# Patient Record
Sex: FEMALE | Race: BLACK OR AFRICAN AMERICAN | NOT HISPANIC OR LATINO | Employment: UNEMPLOYED | URBAN - METROPOLITAN AREA
[De-identification: names, ages, dates, MRNs, and addresses within clinical notes are randomized per-mention and may not be internally consistent; named-entity substitution may affect disease eponyms.]

---

## 2023-03-22 DIAGNOSIS — M54.17 LUMBOSACRAL NEURITIS: Primary | ICD-10-CM

## 2023-03-22 PROBLEM — G43.909 HEADACHE, MIGRAINE: Status: ACTIVE | Noted: 2023-03-22

## 2023-03-22 PROBLEM — M50.30 BULGE OF CERVICAL DISC WITHOUT MYELOPATHY: Status: ACTIVE | Noted: 2023-03-22

## 2023-03-22 PROBLEM — M99.09 SEGMENTAL AND SOMATIC DYSFUNCTION: Status: ACTIVE | Noted: 2023-03-22

## 2023-03-22 PROBLEM — M46.1 SACROILIITIS (CMS-HCC): Status: ACTIVE | Noted: 2023-03-22

## 2023-03-22 PROBLEM — S49.90XA SHOULDER INJURY: Status: ACTIVE | Noted: 2023-03-22

## 2023-03-22 PROBLEM — I10 HTN (HYPERTENSION): Status: ACTIVE | Noted: 2023-03-22

## 2023-03-22 PROBLEM — F41.1 GENERALIZED ANXIETY DISORDER: Status: ACTIVE | Noted: 2023-03-22

## 2023-03-22 PROBLEM — J31.0 CHRONIC RHINITIS: Status: ACTIVE | Noted: 2023-03-22

## 2023-03-22 PROBLEM — E78.5 HYPERLIPEMIA: Status: ACTIVE | Noted: 2023-03-22

## 2023-03-22 PROBLEM — M54.2 NECK PAIN: Status: ACTIVE | Noted: 2023-03-22

## 2023-03-22 PROBLEM — M54.9 BACK PAIN: Status: ACTIVE | Noted: 2023-03-22

## 2023-03-22 PROBLEM — M99.31 OSSEOUS STENOSIS OF NEURAL CANAL OF CERVICAL REGION: Status: ACTIVE | Noted: 2023-03-22

## 2023-03-22 PROBLEM — M54.30 SCIATICA: Status: ACTIVE | Noted: 2023-03-22

## 2023-03-22 PROBLEM — M54.12 CERVICAL RADICULOPATHY: Status: ACTIVE | Noted: 2023-03-22

## 2023-03-22 PROBLEM — J30.9 ALLERGIC RHINITIS: Status: ACTIVE | Noted: 2023-03-22

## 2023-03-22 PROBLEM — M53.9 MULTILEVEL DEGENERATIVE DISC DISEASE: Status: ACTIVE | Noted: 2023-03-22

## 2023-03-22 PROBLEM — M51.369 BULGING LUMBAR DISC: Status: ACTIVE | Noted: 2023-03-22

## 2023-03-22 PROBLEM — K21.9 GERD WITHOUT ESOPHAGITIS: Status: ACTIVE | Noted: 2023-03-22

## 2023-03-22 PROBLEM — M62.830 MUSCLE SPASM OF BACK: Status: ACTIVE | Noted: 2023-03-22

## 2023-03-22 PROBLEM — M54.50 LOW BACK PAIN, UNSPECIFIED: Status: ACTIVE | Noted: 2023-03-22

## 2023-03-22 PROBLEM — L50.8 CHRONIC URTICARIA: Status: ACTIVE | Noted: 2023-03-22

## 2023-03-22 PROBLEM — M51.36 BULGING LUMBAR DISC: Status: ACTIVE | Noted: 2023-03-22

## 2023-03-22 RX ORDER — ALBUTEROL SULFATE 90 UG/1
1-2 AEROSOL, METERED RESPIRATORY (INHALATION)
COMMUNITY

## 2023-03-22 RX ORDER — DICLOFENAC SODIUM 50 MG/1
50 TABLET, DELAYED RELEASE ORAL 3 TIMES DAILY
COMMUNITY
Start: 2022-07-01 | End: 2023-04-18 | Stop reason: ALTCHOICE

## 2023-03-22 RX ORDER — HYDROCODONE BITARTRATE AND ACETAMINOPHEN 5; 325 MG/1; MG/1
1 TABLET ORAL EVERY 6 HOURS PRN
Qty: 60 TABLET | Refills: 0 | Status: SHIPPED | OUTPATIENT
Start: 2023-03-22 | End: 2023-04-03 | Stop reason: RX

## 2023-03-22 RX ORDER — IBUPROFEN 600 MG/1
800 TABLET ORAL 3 TIMES DAILY
COMMUNITY
Start: 2022-06-27 | End: 2023-04-18 | Stop reason: ALTCHOICE

## 2023-03-22 RX ORDER — ACETAMINOPHEN 325 MG/1
325 TABLET ORAL EVERY 6 HOURS PRN
COMMUNITY
Start: 2023-01-23 | End: 2023-05-15 | Stop reason: ALTCHOICE

## 2023-03-22 RX ORDER — DOXYCYCLINE 100 MG/1
100 CAPSULE ORAL 2 TIMES DAILY
COMMUNITY
Start: 2022-12-09 | End: 2023-07-10 | Stop reason: ALTCHOICE

## 2023-03-22 RX ORDER — LEVOCETIRIZINE DIHYDROCHLORIDE 5 MG/1
5 TABLET, FILM COATED ORAL 2 TIMES DAILY
COMMUNITY
End: 2024-01-12

## 2023-03-22 RX ORDER — COLLAGEN, HYDROLYSATE (BOVINE) 100 %
POWDER (GRAM) MISCELLANEOUS
COMMUNITY
End: 2023-04-18 | Stop reason: ALTCHOICE

## 2023-03-22 RX ORDER — LORAZEPAM 2 MG/1
TABLET ORAL
COMMUNITY
Start: 2022-07-01 | End: 2024-01-25 | Stop reason: SDUPTHER

## 2023-03-22 RX ORDER — FLUTICASONE PROPIONATE 50 MCG
2 SPRAY, SUSPENSION (ML) NASAL DAILY
COMMUNITY

## 2023-03-22 RX ORDER — CALCIUM ACETATE 668 MG
TABLET ORAL DAILY
COMMUNITY

## 2023-03-22 RX ORDER — CLOBETASOL PROPIONATE 0.5 MG/G
CREAM TOPICAL 2 TIMES DAILY
COMMUNITY
Start: 2022-12-09

## 2023-03-22 RX ORDER — HYDROCODONE BITARTRATE AND ACETAMINOPHEN 5; 325 MG/1; MG/1
1 TABLET ORAL EVERY 6 HOURS PRN
COMMUNITY
End: 2023-03-22 | Stop reason: SDUPTHER

## 2023-03-22 RX ORDER — METOPROLOL SUCCINATE 25 MG/1
25 TABLET, EXTENDED RELEASE ORAL DAILY
COMMUNITY
Start: 2023-02-02 | End: 2023-12-11

## 2023-03-22 RX ORDER — NALOXONE HYDROCHLORIDE 4 MG/.1ML
4 SPRAY NASAL AS NEEDED
COMMUNITY
Start: 2020-03-09

## 2023-03-22 RX ORDER — CETIRIZINE HYDROCHLORIDE 10 MG/1
10 TABLET ORAL DAILY
COMMUNITY
End: 2023-04-18 | Stop reason: ALTCHOICE

## 2023-03-22 RX ORDER — CYCLOBENZAPRINE HCL 10 MG
10 TABLET ORAL
COMMUNITY
Start: 2022-06-27 | End: 2023-04-18 | Stop reason: ALTCHOICE

## 2023-03-28 ENCOUNTER — TELEPHONE (OUTPATIENT)
Dept: PRIMARY CARE | Facility: CLINIC | Age: 68
End: 2023-03-28
Payer: COMMERCIAL

## 2023-03-28 RX ORDER — TIZANIDINE 2 MG/1
1 TABLET ORAL EVERY 6 HOURS
COMMUNITY
Start: 2022-07-21 | End: 2023-04-18 | Stop reason: ALTCHOICE

## 2023-03-28 RX ORDER — OMEPRAZOLE 20 MG/1
1 CAPSULE, DELAYED RELEASE ORAL DAILY
COMMUNITY
Start: 2022-03-23 | End: 2023-09-24

## 2023-03-28 RX ORDER — PRAVASTATIN SODIUM 40 MG/1
1 TABLET ORAL DAILY
COMMUNITY
Start: 2018-10-27 | End: 2023-06-22

## 2023-03-28 NOTE — TELEPHONE ENCOUNTER
Patient left message the drug store does not have the hydrocodone any longer she wants to know if you can send in oxycodone in

## 2023-04-03 ENCOUNTER — OFFICE VISIT (OUTPATIENT)
Dept: PRIMARY CARE | Facility: CLINIC | Age: 68
End: 2023-04-03
Payer: COMMERCIAL

## 2023-04-03 VITALS
HEIGHT: 65 IN | HEART RATE: 72 BPM | BODY MASS INDEX: 31.32 KG/M2 | SYSTOLIC BLOOD PRESSURE: 140 MMHG | WEIGHT: 188 LBS | DIASTOLIC BLOOD PRESSURE: 89 MMHG | RESPIRATION RATE: 12 BRPM

## 2023-04-03 DIAGNOSIS — M54.17 LUMBOSACRAL NEURITIS: Primary | ICD-10-CM

## 2023-04-03 PROCEDURE — 3077F SYST BP >= 140 MM HG: CPT | Performed by: INTERNAL MEDICINE

## 2023-04-03 PROCEDURE — 1036F TOBACCO NON-USER: CPT | Performed by: INTERNAL MEDICINE

## 2023-04-03 PROCEDURE — 3079F DIAST BP 80-89 MM HG: CPT | Performed by: INTERNAL MEDICINE

## 2023-04-03 PROCEDURE — 1159F MED LIST DOCD IN RCRD: CPT | Performed by: INTERNAL MEDICINE

## 2023-04-03 PROCEDURE — 99213 OFFICE O/P EST LOW 20 MIN: CPT | Performed by: INTERNAL MEDICINE

## 2023-04-03 RX ORDER — BUTYROSPERMUM PARKII(SHEA BUTTER), SIMMONDSIA CHINENSIS (JOJOBA) SEED OIL, ALOE BARBADENSIS LEAF EXTRACT .01; 1; 3.5 G/100G; G/100G; G/100G
250 LIQUID TOPICAL 2 TIMES DAILY
COMMUNITY
End: 2023-04-18 | Stop reason: ALTCHOICE

## 2023-04-03 RX ORDER — OXYCODONE HYDROCHLORIDE 5 MG/1
5 TABLET ORAL EVERY 4 HOURS PRN
Qty: 30 TABLET | Refills: 0 | Status: SHIPPED | OUTPATIENT
Start: 2023-04-03 | End: 2023-05-15 | Stop reason: ALTCHOICE

## 2023-04-03 ASSESSMENT — ENCOUNTER SYMPTOMS
LOSS OF SENSATION IN FEET: 1
DEPRESSION: 0
OCCASIONAL FEELINGS OF UNSTEADINESS: 0

## 2023-04-03 NOTE — PROGRESS NOTES
Subjective   Patient ID: Ac Torres is a 67 y.o. female who presents for Back Pain.    HPI   Refill pain med.  She has appointment to see her Pain management doctor tomorrow, hoping to get injections again. It helped last time.  Pain med is from us.  Her pharmacy told her that they have no hydrocodone, and no pharmacy carries that currently. They do have oxycodone.   She needs prior authorization for all pain meds per pt.  OARRS:  Heriberto Keller MD on 4/3/2023  1:00 PM  I have personally reviewed the OARRS report for Ac Torres. I have considered the risks of abuse, dependence, addiction and diversion    Is the patient prescribed a combination of a benzodiazepine and opioid?  Yes, I feel it is clincially indicated to continue the medication and have discussed with the patient risks/benefits/alternatives.    Last Urine Drug Screen / ordered today: Yes  Recent Results (from the past 11029 hour(s))   OPIATE/OPIOID/BENZO PRESCRIPTION COMPLIANCE    Collection Time: 08/22/22 12:45 PM   Result Value Ref Range    DRUG SCREEN COMMENT URINE SEE BELOW     Creatine, Urine 207.1 mg/dL    Amphetamine Screen, Urine PRESUMPTIVE NEGATIVE NEGATIVE    Barbiturate Screen, Urine PRESUMPTIVE NEGATIVE NEGATIVE    Cannabinoid Screen, Urine PRESUMPTIVE NEGATIVE NEGATIVE    Cocaine Screen, Urine PRESUMPTIVE NEGATIVE NEGATIVE    PCP Screen, Urine PRESUMPTIVE NEGATIVE NEGATIVE    7-Aminoclonazepam <25 Cutoff <25 ng/mL    Alpha-Hydroxyalprazolam <25 Cutoff <25 ng/mL    Alpha-Hydroxymidazolam <25 Cutoff <25 ng/mL    Alprazolam <25 Cutoff <25 ng/mL    Chlordiazepoxide <25 Cutoff <25 ng/mL    Clonazepam <25 Cutoff <25 ng/mL    Diazepam <25 Cutoff <25 ng/mL    Lorazepam 669 (A) Cutoff <25 ng/mL    Midazolam <25 Cutoff <25 ng/mL    Nordiazepam <25 Cutoff <25 ng/mL    Oxazepam <25 Cutoff <25 ng/mL    Temazepam <25 Cutoff <25 ng/mL    Zolpidem <25 Cutoff <25 ng/mL    Zolpidem Metabolite (ZCA) <25 Cutoff <25 ng/mL    6-Acetylmorphine <25  "Cutoff <25 ng/mL    Codeine <50 Cutoff <50 ng/mL    Hydrocodone <25 Cutoff <25 ng/mL    Hydromorphone <25 Cutoff <25 ng/mL    Morphine Urine <50 Cutoff <50 ng/mL    Norhydrocodone 41 (A) Cutoff <25 ng/mL    Noroxycodone <25 Cutoff <25 ng/mL    Oxycodone <25 Cutoff <25 ng/mL    Oxymorphone <25 Cutoff <25 ng/mL    Tramadol <50 Cutoff <50 ng/mL    O-Desmethyltramadol <50 Cutoff <50 ng/mL    Fentanyl <2.5 Cutoff<2.5 ng/mL    Norfentanyl <2.5 Cutoff<2.5 ng/mL    METHADONE CONFIRMATION,URINE <25 Cutoff <25 ng/mL    EDDP <25 Cutoff <25 ng/mL     Results are as expected.     Controlled Substance Agreement:  Date of the Last Agreement: today.    Reviewed Controlled Substance Agreement including but not limited to the benefits, risks, and alternatives to treatment with a Controlled Substance medication(s).    Opioids:  What is the patient's goal of therapy? Pain control  Is this being achieved with current treatment?  yes    I have calculated the patient's Morphine Dose Equivalent (MED):   I have considered referral to Pain Management and/or a specialist, and do not feel it is necessary at this time.    I feel that it is clinically indicated to continue this current medication regimen after consideration of alternative therapies, and other non-opioid treatment.    Opioid Risk Screening:  No data recorded    Pain Assessment:  No data recorded  Review of Systems    Objective   /89   Pulse 72   Resp 12   Ht 1.657 m (5' 5.25\")   Wt 85.3 kg (188 lb)   BMI 31.05 kg/m²     Physical Exam  Pt is in pain right now, left lower back down to left leg.   No numbness or weakness.    Assessment/Plan   Diagnoses and all orders for this visit:  Lumbosacral neuritis  -     oxyCODONE (Roxicodone) 5 mg immediate release tablet; Take 1 tablet (5 mg) by mouth every 4 hours if needed for moderate pain (4 - 6).    Her last urine screen was last Fall.    Follow up 3 months and as needed     "

## 2023-04-04 ENCOUNTER — TELEPHONE (OUTPATIENT)
Dept: PRIMARY CARE | Facility: CLINIC | Age: 68
End: 2023-04-04
Payer: COMMERCIAL

## 2023-04-04 NOTE — TELEPHONE ENCOUNTER
Let pt know that her bone density showed her bone density is mostly normal, and mild osteopenia at certain area. Please continue food rich in vit D calcium, and continue Weight bearing exercises

## 2023-04-17 ENCOUNTER — E-VISIT (OUTPATIENT)
Dept: PRIMARY CARE | Facility: CLINIC | Age: 68
End: 2023-04-17
Payer: COMMERCIAL

## 2023-04-18 ENCOUNTER — OFFICE VISIT (OUTPATIENT)
Dept: PRIMARY CARE | Facility: CLINIC | Age: 68
End: 2023-04-18
Payer: COMMERCIAL

## 2023-04-18 VITALS
SYSTOLIC BLOOD PRESSURE: 149 MMHG | WEIGHT: 190 LBS | HEART RATE: 62 BPM | BODY MASS INDEX: 30.53 KG/M2 | HEIGHT: 66 IN | DIASTOLIC BLOOD PRESSURE: 84 MMHG | OXYGEN SATURATION: 95 % | TEMPERATURE: 96.7 F

## 2023-04-18 DIAGNOSIS — H10.31 ACUTE BACTERIAL CONJUNCTIVITIS OF RIGHT EYE: Primary | ICD-10-CM

## 2023-04-18 PROCEDURE — 99213 OFFICE O/P EST LOW 20 MIN: CPT | Performed by: INTERNAL MEDICINE

## 2023-04-18 PROCEDURE — 3079F DIAST BP 80-89 MM HG: CPT | Performed by: INTERNAL MEDICINE

## 2023-04-18 PROCEDURE — 1036F TOBACCO NON-USER: CPT | Performed by: INTERNAL MEDICINE

## 2023-04-18 PROCEDURE — 3077F SYST BP >= 140 MM HG: CPT | Performed by: INTERNAL MEDICINE

## 2023-04-18 PROCEDURE — 1159F MED LIST DOCD IN RCRD: CPT | Performed by: INTERNAL MEDICINE

## 2023-04-18 RX ORDER — CHOLECALCIFEROL (VITAMIN D3) 25 MCG
1000 TABLET ORAL DAILY
COMMUNITY

## 2023-04-18 RX ORDER — SULFACETAMIDE SODIUM 100 MG/ML
2 SOLUTION/ DROPS OPHTHALMIC 4 TIMES DAILY
Qty: 15 ML | Refills: 0 | Status: SHIPPED | OUTPATIENT
Start: 2023-04-18 | End: 2023-04-25

## 2023-04-18 RX ORDER — HYDROXYZINE HYDROCHLORIDE 25 MG/1
TABLET, FILM COATED ORAL
COMMUNITY
Start: 2023-04-13 | End: 2023-05-15 | Stop reason: ALTCHOICE

## 2023-04-18 RX ORDER — TRIAMCINOLONE ACETONIDE 1 MG/G
OINTMENT TOPICAL
COMMUNITY
Start: 2023-04-13 | End: 2023-06-07 | Stop reason: ALTCHOICE

## 2023-04-18 RX ORDER — CLINDAMYCIN PHOSPHATE 10 UG/ML
LOTION TOPICAL
COMMUNITY
Start: 2023-04-13

## 2023-04-18 RX ORDER — LORAZEPAM 1 MG/1
1 TABLET ORAL DAILY PRN
COMMUNITY
Start: 2016-06-21 | End: 2023-06-07 | Stop reason: SDUPTHER

## 2023-04-18 RX ORDER — DESONIDE 0.5 MG/G
OINTMENT TOPICAL
COMMUNITY
Start: 2023-04-13

## 2023-04-18 NOTE — PROGRESS NOTES
"The patient is here for:   Chief Complaint   Patient presents with    Conjunctivitis        I have reviewed existing histories, notes, past medical history, surgical history, social history, family history, med list, allergy list, and immunization, and updated if applicable.    Patient's PCP is: Heriberto Keller MD    HPI:     right eye gritty, matted, burning since Saturday.  No other symptoms with this.  Today is feeling bit better.    Additional concerns and ROS:  none    Physical exam:  /84   Pulse 62   Temp 35.9 °C (96.7 °F)   Ht 1.676 m (5' 6\")   Wt 86.2 kg (190 lb)   SpO2 95%   BMI 30.67 kg/m²     right eye conjunctivitis noted. No piter orbital erythema or induration    Assessments/orders:   1. Acute bacterial conjunctivitis of right eye     - sulfacetamide (Bleph-10) 10 % ophthalmic solution; Administer 2 drops into affected eye(s) in the morning and 2 drops at noon and 2 drops in the evening and 2 drops before bedtime. Do all this for 7 days.  Dispense: 15 mL; Refill: 0      Plan/discussion and follow up:     Follow up prn           "

## 2023-04-24 ENCOUNTER — HOSPITAL ENCOUNTER (OUTPATIENT)
Dept: DATA CONVERSION | Facility: HOSPITAL | Age: 68
End: 2023-04-24
Attending: ANESTHESIOLOGY | Admitting: ANESTHESIOLOGY
Payer: COMMERCIAL

## 2023-04-24 DIAGNOSIS — M46.1 SACROILIITIS, NOT ELSEWHERE CLASSIFIED (CMS-HCC): ICD-10-CM

## 2023-04-24 DIAGNOSIS — Z91.040 LATEX ALLERGY STATUS: ICD-10-CM

## 2023-04-24 DIAGNOSIS — M54.50 LOW BACK PAIN, UNSPECIFIED: ICD-10-CM

## 2023-05-15 ENCOUNTER — PATIENT MESSAGE (OUTPATIENT)
Dept: PRIMARY CARE | Facility: CLINIC | Age: 68
End: 2023-05-15
Payer: COMMERCIAL

## 2023-05-15 DIAGNOSIS — M48.02 SPINAL STENOSIS OF CERVICAL REGION: Primary | Chronic | ICD-10-CM

## 2023-05-15 PROBLEM — J45.901 ASTHMA FLARE (HHS-HCC): Status: ACTIVE | Noted: 2023-05-15

## 2023-05-15 PROBLEM — G89.29 CHRONIC LOW BACK PAIN: Status: ACTIVE | Noted: 2023-05-15

## 2023-05-15 PROBLEM — I47.10 PAROXYSMAL SUPRAVENTRICULAR TACHYCARDIA (CMS-HCC): Status: ACTIVE | Noted: 2017-03-30

## 2023-05-15 PROBLEM — R07.89 ATYPICAL CHEST PAIN: Status: ACTIVE | Noted: 2023-05-15

## 2023-05-15 PROBLEM — R94.31 ABNORMAL EKG: Status: ACTIVE | Noted: 2023-05-15

## 2023-05-15 PROBLEM — R94.39 ABNORMAL STRESS TEST: Status: ACTIVE | Noted: 2023-05-15

## 2023-05-15 PROBLEM — M17.11 ARTHRITIS OF RIGHT KNEE: Status: ACTIVE | Noted: 2023-05-15

## 2023-05-15 PROBLEM — M25.561 KNEE PAIN, RIGHT: Status: ACTIVE | Noted: 2023-05-15

## 2023-05-15 PROBLEM — S46.912A LEFT SHOULDER STRAIN, INITIAL ENCOUNTER: Status: ACTIVE | Noted: 2021-05-06

## 2023-05-15 PROBLEM — M54.50 CHRONIC LOW BACK PAIN: Status: ACTIVE | Noted: 2023-05-15

## 2023-05-15 PROBLEM — J30.1 SEASONAL ALLERGIC RHINITIS DUE TO POLLEN: Status: ACTIVE | Noted: 2023-05-15

## 2023-05-15 PROBLEM — R49.0 HOARSENESS: Status: ACTIVE | Noted: 2023-05-15

## 2023-05-15 PROBLEM — R73.03 PREDIABETES: Status: ACTIVE | Noted: 2020-01-17

## 2023-05-15 PROBLEM — M75.42 IMPINGEMENT SYNDROME OF LEFT SHOULDER: Status: ACTIVE | Noted: 2021-05-06

## 2023-05-15 PROBLEM — M75.122 COMPLETE TEAR OF LEFT ROTATOR CUFF: Status: ACTIVE | Noted: 2021-05-06

## 2023-05-15 RX ORDER — OXYCODONE AND ACETAMINOPHEN 5; 325 MG/1; MG/1
1 TABLET ORAL EVERY 6 HOURS PRN
Qty: 30 TABLET | Refills: 0 | Status: SHIPPED | OUTPATIENT
Start: 2023-05-15 | End: 2023-06-14

## 2023-05-15 NOTE — TELEPHONE ENCOUNTER
From: Ac Torres  To: Heriberto Keller MD  Sent: 5/15/2023 9:16 AM EDT  Subject: Rx    May I have a prescription for oxycodone acetaminophen 5-325....Injection for sacroiliitis only worked 50% dealing with the pain again and my next appointment in pain mgt is in June

## 2023-06-02 LAB
ANION GAP IN SER/PLAS: 11 MMOL/L (ref 10–20)
CALCIUM (MG/DL) IN SER/PLAS: 9.8 MG/DL (ref 8.6–10.6)
CARBON DIOXIDE, TOTAL (MMOL/L) IN SER/PLAS: 30 MMOL/L (ref 21–32)
CHLORIDE (MMOL/L) IN SER/PLAS: 103 MMOL/L (ref 98–107)
CREATININE (MG/DL) IN SER/PLAS: 1.01 MG/DL (ref 0.5–1.05)
GFR FEMALE: 61 ML/MIN/1.73M2
GLUCOSE (MG/DL) IN SER/PLAS: 94 MG/DL (ref 74–99)
NATRIURETIC PEPTIDE B (PG/ML) IN SER/PLAS: 7 PG/ML (ref 0–99)
POTASSIUM (MMOL/L) IN SER/PLAS: 3.8 MMOL/L (ref 3.5–5.3)
SODIUM (MMOL/L) IN SER/PLAS: 140 MMOL/L (ref 136–145)
UREA NITROGEN (MG/DL) IN SER/PLAS: 16 MG/DL (ref 6–23)

## 2023-06-05 ENCOUNTER — TELEPHONE (OUTPATIENT)
Dept: PRIMARY CARE | Facility: CLINIC | Age: 68
End: 2023-06-05
Payer: COMMERCIAL

## 2023-06-07 DIAGNOSIS — F41.1 GENERALIZED ANXIETY DISORDER: Primary | ICD-10-CM

## 2023-06-07 PROBLEM — R06.09 DOE (DYSPNEA ON EXERTION): Status: ACTIVE | Noted: 2023-06-07

## 2023-06-07 PROBLEM — F41.9 ANXIETY DISORDER: Status: ACTIVE | Noted: 2023-06-07

## 2023-06-07 PROBLEM — I47.10 PAROXYSMAL SVT (SUPRAVENTRICULAR TACHYCARDIA) (CMS-HCC): Status: ACTIVE | Noted: 2023-06-07

## 2023-06-07 PROBLEM — R00.2 HEART PALPITATIONS: Status: ACTIVE | Noted: 2023-06-07

## 2023-06-07 RX ORDER — HYDROCHLOROTHIAZIDE 25 MG/1
25 TABLET ORAL DAILY
COMMUNITY
End: 2024-02-15 | Stop reason: SDUPTHER

## 2023-06-08 RX ORDER — LORAZEPAM 1 MG/1
1 TABLET ORAL DAILY PRN
Qty: 30 TABLET | Refills: 0 | Status: SHIPPED | OUTPATIENT
Start: 2023-06-08 | End: 2023-08-15 | Stop reason: SDUPTHER

## 2023-06-22 DIAGNOSIS — E78.5 HYPERLIPIDEMIA, UNSPECIFIED: ICD-10-CM

## 2023-06-22 RX ORDER — PRAVASTATIN SODIUM 40 MG/1
TABLET ORAL
Qty: 90 TABLET | Refills: 2 | Status: SHIPPED | OUTPATIENT
Start: 2023-06-22 | End: 2024-02-07 | Stop reason: SDUPTHER

## 2023-07-06 PROBLEM — M47.816 LUMBAR SPONDYLOSIS: Status: ACTIVE | Noted: 2023-07-06

## 2023-07-06 RX ORDER — OXYCODONE AND ACETAMINOPHEN 5; 325 MG/1; MG/1
TABLET ORAL
COMMUNITY
End: 2023-09-12 | Stop reason: SDUPTHER

## 2023-07-06 RX ORDER — ELECTROLYTES/DEXTROSE
SOLUTION, ORAL ORAL
COMMUNITY

## 2023-07-10 ENCOUNTER — OFFICE VISIT (OUTPATIENT)
Dept: PRIMARY CARE | Facility: CLINIC | Age: 68
End: 2023-07-10
Payer: COMMERCIAL

## 2023-07-10 ENCOUNTER — LAB (OUTPATIENT)
Dept: LAB | Facility: LAB | Age: 68
End: 2023-07-10
Payer: COMMERCIAL

## 2023-07-10 VITALS
OXYGEN SATURATION: 98 % | BODY MASS INDEX: 30.22 KG/M2 | WEIGHT: 188 LBS | HEIGHT: 66 IN | TEMPERATURE: 97.8 F | HEART RATE: 65 BPM | DIASTOLIC BLOOD PRESSURE: 86 MMHG | SYSTOLIC BLOOD PRESSURE: 122 MMHG

## 2023-07-10 DIAGNOSIS — R73.03 PRE-DIABETES: ICD-10-CM

## 2023-07-10 DIAGNOSIS — R42 DIZZY: ICD-10-CM

## 2023-07-10 DIAGNOSIS — R53.83 TIREDNESS: ICD-10-CM

## 2023-07-10 DIAGNOSIS — Z00.00 MEDICARE ANNUAL WELLNESS VISIT, INITIAL: Primary | ICD-10-CM

## 2023-07-10 DIAGNOSIS — E78.5 HYPERLIPIDEMIA, UNSPECIFIED HYPERLIPIDEMIA TYPE: ICD-10-CM

## 2023-07-10 DIAGNOSIS — Z79.899 HIGH RISK MEDICATION USE: ICD-10-CM

## 2023-07-10 PROBLEM — M17.11 PRIMARY OSTEOARTHRITIS OF RIGHT KNEE: Status: ACTIVE | Noted: 2019-12-19

## 2023-07-10 LAB
ALANINE AMINOTRANSFERASE (SGPT) (U/L) IN SER/PLAS: 16 U/L (ref 7–45)
ALBUMIN (G/DL) IN SER/PLAS: 4.1 G/DL (ref 3.4–5)
ALKALINE PHOSPHATASE (U/L) IN SER/PLAS: 73 U/L (ref 33–136)
ANION GAP IN SER/PLAS: 12 MMOL/L (ref 10–20)
ASPARTATE AMINOTRANSFERASE (SGOT) (U/L) IN SER/PLAS: 17 U/L (ref 9–39)
BILIRUBIN TOTAL (MG/DL) IN SER/PLAS: 0.4 MG/DL (ref 0–1.2)
CALCIUM (MG/DL) IN SER/PLAS: 9.7 MG/DL (ref 8.6–10.6)
CARBON DIOXIDE, TOTAL (MMOL/L) IN SER/PLAS: 29 MMOL/L (ref 21–32)
CHLORIDE (MMOL/L) IN SER/PLAS: 107 MMOL/L (ref 98–107)
CHOLESTEROL (MG/DL) IN SER/PLAS: 241 MG/DL (ref 0–199)
CHOLESTEROL IN HDL (MG/DL) IN SER/PLAS: 49.6 MG/DL
CHOLESTEROL/HDL RATIO: 4.9
CREATININE (MG/DL) IN SER/PLAS: 1.06 MG/DL (ref 0.5–1.05)
ERYTHROCYTE DISTRIBUTION WIDTH (RATIO) BY AUTOMATED COUNT: 15.4 % (ref 11.5–14.5)
ERYTHROCYTE MEAN CORPUSCULAR HEMOGLOBIN CONCENTRATION (G/DL) BY AUTOMATED: 30.9 G/DL (ref 32–36)
ERYTHROCYTE MEAN CORPUSCULAR VOLUME (FL) BY AUTOMATED COUNT: 89 FL (ref 80–100)
ERYTHROCYTES (10*6/UL) IN BLOOD BY AUTOMATED COUNT: 4.87 X10E12/L (ref 4–5.2)
ESTIMATED AVERAGE GLUCOSE FOR HBA1C: 128 MG/DL
GFR FEMALE: 58 ML/MIN/1.73M2
GLUCOSE (MG/DL) IN SER/PLAS: 96 MG/DL (ref 74–99)
HEMATOCRIT (%) IN BLOOD BY AUTOMATED COUNT: 43.4 % (ref 36–46)
HEMOGLOBIN (G/DL) IN BLOOD: 13.4 G/DL (ref 12–16)
HEMOGLOBIN A1C/HEMOGLOBIN TOTAL IN BLOOD: 6.1 %
LDL: 156 MG/DL (ref 0–99)
LEUKOCYTES (10*3/UL) IN BLOOD BY AUTOMATED COUNT: 6.5 X10E9/L (ref 4.4–11.3)
NRBC (PER 100 WBCS) BY AUTOMATED COUNT: 0 /100 WBC (ref 0–0)
PLATELETS (10*3/UL) IN BLOOD AUTOMATED COUNT: 349 X10E9/L (ref 150–450)
POTASSIUM (MMOL/L) IN SER/PLAS: 4.7 MMOL/L (ref 3.5–5.3)
PROTEIN TOTAL: 7.3 G/DL (ref 6.4–8.2)
SODIUM (MMOL/L) IN SER/PLAS: 143 MMOL/L (ref 136–145)
THYROTROPIN (MIU/L) IN SER/PLAS BY DETECTION LIMIT <= 0.05 MIU/L: 2.22 MIU/L (ref 0.44–3.98)
TRIGLYCERIDE (MG/DL) IN SER/PLAS: 178 MG/DL (ref 0–149)
UREA NITROGEN (MG/DL) IN SER/PLAS: 15 MG/DL (ref 6–23)
VLDL: 36 MG/DL (ref 0–40)

## 2023-07-10 PROCEDURE — 85027 COMPLETE CBC AUTOMATED: CPT

## 2023-07-10 PROCEDURE — 80361 OPIATES 1 OR MORE: CPT

## 2023-07-10 PROCEDURE — 80061 LIPID PANEL: CPT

## 2023-07-10 PROCEDURE — 80368 SEDATIVE HYPNOTICS: CPT

## 2023-07-10 PROCEDURE — 1159F MED LIST DOCD IN RCRD: CPT | Performed by: INTERNAL MEDICINE

## 2023-07-10 PROCEDURE — 3074F SYST BP LT 130 MM HG: CPT | Performed by: INTERNAL MEDICINE

## 2023-07-10 PROCEDURE — 80358 DRUG SCREENING METHADONE: CPT

## 2023-07-10 PROCEDURE — 80053 COMPREHEN METABOLIC PANEL: CPT

## 2023-07-10 PROCEDURE — 80354 DRUG SCREENING FENTANYL: CPT

## 2023-07-10 PROCEDURE — 80365 DRUG SCREENING OXYCODONE: CPT

## 2023-07-10 PROCEDURE — 83036 HEMOGLOBIN GLYCOSYLATED A1C: CPT

## 2023-07-10 PROCEDURE — G0439 PPPS, SUBSEQ VISIT: HCPCS | Performed by: INTERNAL MEDICINE

## 2023-07-10 PROCEDURE — 84443 ASSAY THYROID STIM HORMONE: CPT

## 2023-07-10 PROCEDURE — 80307 DRUG TEST PRSMV CHEM ANLYZR: CPT

## 2023-07-10 PROCEDURE — 1170F FXNL STATUS ASSESSED: CPT | Performed by: INTERNAL MEDICINE

## 2023-07-10 PROCEDURE — 36415 COLL VENOUS BLD VENIPUNCTURE: CPT

## 2023-07-10 PROCEDURE — 1036F TOBACCO NON-USER: CPT | Performed by: INTERNAL MEDICINE

## 2023-07-10 PROCEDURE — 80346 BENZODIAZEPINES1-12: CPT

## 2023-07-10 PROCEDURE — 3079F DIAST BP 80-89 MM HG: CPT | Performed by: INTERNAL MEDICINE

## 2023-07-10 PROCEDURE — 80373 DRUG SCREENING TRAMADOL: CPT

## 2023-07-10 ASSESSMENT — LIFESTYLE VARIABLES: TOTAL SCORE: 0

## 2023-07-10 ASSESSMENT — ACTIVITIES OF DAILY LIVING (ADL)
DOING_HOUSEWORK: INDEPENDENT
BATHING: INDEPENDENT
DRESSING: INDEPENDENT
GROCERY_SHOPPING: INDEPENDENT
MANAGING_FINANCES: INDEPENDENT
TAKING_MEDICATION: INDEPENDENT

## 2023-07-10 ASSESSMENT — PATIENT HEALTH QUESTIONNAIRE - PHQ9
SUM OF ALL RESPONSES TO PHQ9 QUESTIONS 1 AND 2: 0
1. LITTLE INTEREST OR PLEASURE IN DOING THINGS: NOT AT ALL
2. FEELING DOWN, DEPRESSED OR HOPELESS: NOT AT ALL

## 2023-07-10 NOTE — PROGRESS NOTES
SUBJECTIVE:   Ac Torres is a 68 y.o. female presenting for her annual physical/wellness.  PCP: Heriberto Keller MD  Current Outpatient Medications on File Prior to Visit   Medication Sig Dispense Refill    albuterol 90 mcg/actuation inhaler Inhale 2 puffs  in the morning and 2 puffs in the evening and 2 puffs before bedtime.      B.animalis,bifid,infantis,long (PROBIOTIC 4X ORAL) Take by mouth once daily.      biotin 5 mg capsule Take by mouth.      calcium acetate 668 mg (169 mg calcium) tablet Take by mouth once daily.      cholecalciferol (Vitamin D-3) 25 MCG (1000 UT) tablet Take 1 tablet (1,000 Units) by mouth once daily.      clindamycin (Cleocin T) 1 % lotion       clobetasol (Temovate) 0.05 % cream Apply topically 2 times a day.      desonide (DesOwen) 0.05 % ointment       fluticasone (Flonase) 50 mcg/actuation nasal spray Administer 2 sprays into each nostril once daily.      hydroCHLOROthiazide (HYDRODiuril) 25 mg tablet Take 1 tablet (25 mg) by mouth once daily.      levocetirizine (Xyzal) 5 mg tablet Take 1 tablet (5 mg) by mouth in the morning and 1 tablet (5 mg) before bedtime.      LORazepam (Ativan) 1 mg tablet Take 1 tablet (1 mg) by mouth once daily as needed for anxiety. 30 tablet 0    LORazepam (Ativan) 2 mg tablet Take 1 tablet (2 mg) by mouth every 6 hours if needed.      metoprolol succinate XL (Toprol-XL) 25 mg 24 hr tablet Take 1 tablet (25 mg) by mouth once daily.      naloxone (Narcan) 4 mg/0.1 mL nasal spray Administer 1 spray (4 mg) into affected nostril(s) if needed.      omeprazole (PriLOSEC) 20 mg DR capsule Take 1 capsule (20 mg) by mouth once daily.      oxyCODONE-acetaminophen (Percocet) 5-325 mg tablet Take by mouth.      pravastatin (Pravachol) 40 mg tablet TAKE 1 TABLET BY MOUTH EVERYDAY AT BEDTIME 90 tablet 2    [DISCONTINUED] doxycycline (Vibramycin) 100 mg capsule Take 1 capsule (100 mg) by mouth in the morning and 1 capsule (100 mg) before bedtime.       No current  "facility-administered medications on file prior to visit.     Medicare wellness visit. Also Follow up on urgent care visit yesterday. She woke up feeling lightheaded, it did not get better as day went, so her  drove her to our urgent care here. They checked EKG, did orthostatic and examined her, the only thing was that her pulses ox dropped when she stood up. EKG was alright. She did not have any neurologic symptoms, no sob cp worsening edema. No Fever and chills. No GI or  symptoms.  Today she no longer feels lightheaded. Just feels very groggy, wants to sleep. Past week she lost a couple of relatives, and then her birthday yesterday people kept sending her messages, so she has slept very little for past week, 2-3 hours per night. Her mind also races at times. She did learn how to meditate a while back, and has not done it for some time. She will try to go back to it.        Colon screening:  Colonoscopy 2018 at [facility], no polyps. q10y  Last pap: na  Last mammogram: march this year  Dexa ?  Vaccines Up to date. Pt declined all vaccines.  Diet:   healthy in general  Exercises:  regularly  Lab Results   Component Value Date    HGBA1C 5.7 (A) 12/09/2022     Lab Results   Component Value Date    CREATININE 1.01 06/02/2023     Lab Results   Component Value Date    WBC 6.4 01/23/2023    HGB 12.8 01/23/2023    HCT 40.7 01/23/2023    MCV 86 01/23/2023     01/23/2023     Lab Results   Component Value Date    CHOL 212 (H) 12/28/2021    CHOL 236 (H) 06/23/2020    CHOL 223 (H) 06/17/2019     Lab Results   Component Value Date    HDL 38.4 (A) 12/28/2021    HDL 48.1 06/23/2020    HDL 43.2 06/17/2019     No results found for: \"LDLCALC\"  Lab Results   Component Value Date    TRIG 154 (H) 12/28/2021    TRIG 125 06/23/2020    TRIG 140 06/17/2019     No components found for: \"CHOLHDL\"       ROS:   Feeling well. No dyspnea or chest pain on exertion. No abdominal pain, change in bowel habits, black or bloody stools. No " "urinary tract or  symptoms.  No breast concerns. No neurological complaints.    OBJECTIVE:   The patient appears well, alert, oriented x 3, in no distress.   /86   Pulse 65   Temp 36.6 °C (97.8 °F)   Ht 1.676 m (5' 6\")   Wt 85.3 kg (188 lb)   SpO2 98%   BMI 30.34 kg/m²   ENT normal.  Neck supple. No adenopathy or thyromegaly. RADHA. Lungs are clear, good air entry, no wheezes, rhonchi or rales. S1 and S2 normal, no murmurs, regular rate and rhythm. Abdomen is soft without tenderness, guarding, mass or organomegaly.  exam deferred to Gyn. Extremities show no edema, normal peripheral pulses. Neurological is normal without focal findings.    A/P:  1. Medicare annual wellness visit, initial       2. Dizzy     - CBC; Future  - Comprehensive Metabolic Panel; Future  - TSH with reflex to Free T4 if abnormal; Future    3. Tiredness     - CBC; Future  - TSH with reflex to Free T4 if abnormal; Future    4. Hyperlipidemia, unspecified hyperlipidemia type     - Comprehensive Metabolic Panel; Future  - Lipid Panel; Future    5. Pre-diabetes     - Hemoglobin A1C; Future   PLAN:       Follow up:  3 months    OARRS:  Heriberto Keller MD on 7/10/2023  9:31 AM  I have personally reviewed the OARRS report for Ac Torres. I have considered the risks of abuse, dependence, addiction and diversion    Is the patient prescribed a combination of a benzodiazepine and opioid?  Yes, I feel it is clincially indicated to continue the medication and have discussed with the patient risks/benefits/alternatives.    Last Urine Drug Screen / ordered today: Yes  Recent Results (from the past 06892 hour(s))   OPIATE/OPIOID/BENZO PRESCRIPTION COMPLIANCE    Collection Time: 08/22/22 12:45 PM   Result Value Ref Range    DRUG SCREEN COMMENT URINE SEE BELOW     Creatine, Urine 207.1 mg/dL    Amphetamine Screen, Urine PRESUMPTIVE NEGATIVE NEGATIVE    Barbiturate Screen, Urine PRESUMPTIVE NEGATIVE NEGATIVE    Cannabinoid Screen, Urine " PRESUMPTIVE NEGATIVE NEGATIVE    Cocaine Screen, Urine PRESUMPTIVE NEGATIVE NEGATIVE    PCP Screen, Urine PRESUMPTIVE NEGATIVE NEGATIVE    7-Aminoclonazepam <25 Cutoff <25 ng/mL    Alpha-Hydroxyalprazolam <25 Cutoff <25 ng/mL    Alpha-Hydroxymidazolam <25 Cutoff <25 ng/mL    Alprazolam <25 Cutoff <25 ng/mL    Chlordiazepoxide <25 Cutoff <25 ng/mL    Clonazepam <25 Cutoff <25 ng/mL    Diazepam <25 Cutoff <25 ng/mL    Lorazepam 669 (A) Cutoff <25 ng/mL    Midazolam <25 Cutoff <25 ng/mL    Nordiazepam <25 Cutoff <25 ng/mL    Oxazepam <25 Cutoff <25 ng/mL    Temazepam <25 Cutoff <25 ng/mL    Zolpidem <25 Cutoff <25 ng/mL    Zolpidem Metabolite (ZCA) <25 Cutoff <25 ng/mL    6-Acetylmorphine <25 Cutoff <25 ng/mL    Codeine <50 Cutoff <50 ng/mL    Hydrocodone <25 Cutoff <25 ng/mL    Hydromorphone <25 Cutoff <25 ng/mL    Morphine Urine <50 Cutoff <50 ng/mL    Norhydrocodone 41 (A) Cutoff <25 ng/mL    Noroxycodone <25 Cutoff <25 ng/mL    Oxycodone <25 Cutoff <25 ng/mL    Oxymorphone <25 Cutoff <25 ng/mL    Tramadol <50 Cutoff <50 ng/mL    O-Desmethyltramadol <50 Cutoff <50 ng/mL    Fentanyl <2.5 Cutoff<2.5 ng/mL    Norfentanyl <2.5 Cutoff<2.5 ng/mL    METHADONE CONFIRMATION,URINE <25 Cutoff <25 ng/mL    EDDP <25 Cutoff <25 ng/mL     Results are as expected.     Controlled Substance Agreement:  Date of the Last Agreement: today  Reviewed Controlled Substance Agreement including but not limited to the benefits, risks, and alternatives to treatment with a Controlled Substance medication(s).    Opioids:  What is the patient's goal of therapy? Chronic low back pain DDD  Is this being achieved with current treatment? yes    I have calculated the patient's Morphine Dose Equivalent (MED):   Pt is seeing pain management  I feel that it is clinically indicated to continue this current medication regimen after consideration of alternative therapies, and other non-opioid treatment.    Opioid Risk Screening:  Family History of Substance  Abuse  Alcohol: 0 (7/10/2023  9:00 AM)  Illegal Dru (7/10/2023  9:00 AM)  Prescription Dru (7/10/2023  9:00 AM)    Personal History of Substance Abuse  Alcohol: 0 (7/10/2023  9:00 AM)  Illegal Drugs: 0 (7/10/2023  9:00 AM)  Prescription Drugs: 0 (7/10/2023  9:00 AM)    Patient Age (16-45)  Age (16-45): 0 (7/10/2023  9:00 AM)    History of Preadolescent Sexual Abuse  History of Preadolescent Sexual Abuse: 0 (7/10/2023  9:00 AM)    Psychological Disease  Attention Deficit Disorder, Obsessive Compulsive Disorder, Bipolar, Schizophrenia: 0 (7/10/2023  9:00 AM)  Depression: 0 (7/10/2023  9:00 AM)    Total Score  Total Score: 0 (7/10/2023  9:00 AM)    Total Score Risk Category  TOTAL SCORE CATEGORY: Low Risk (0-3) (7/10/2023  9:00 AM)        Pain Assessment:  No data recorded and Benzodiazepines:  What is the patient's goal of therapy? anxiety  Is this being achieved with current treatment? yes    JUAN-7:  No data recorded    Activities of Daily Living:   Is your overall impression that this patient is benefiting (symptom reduction outweighs side effects) from benzodiazepine therapy? Yes     1. Physical Functioning: Better  2. Family Relationship: Better  3. Social Relationship: Better  4. Mood: Better  5. Sleep Patterns: Better  6. Overall Function: Better

## 2023-07-13 LAB
6-ACETYLMORPHINE: <25 NG/ML
7-AMINOCLONAZEPAM: <25 NG/ML
ALPHA-HYDROXYALPRAZOLAM: <25 NG/ML
ALPHA-HYDROXYMIDAZOLAM: <25 NG/ML
ALPRAZOLAM: <25 NG/ML
AMPHETAMINE (PRESENCE) IN URINE BY SCREEN METHOD: ABNORMAL
BARBITURATES PRESENCE IN URINE BY SCREEN METHOD: ABNORMAL
CANNABINOIDS IN URINE BY SCREEN METHOD: ABNORMAL
CHLORDIAZEPOXIDE: <25 NG/ML
CLONAZEPAM: <25 NG/ML
COCAINE (PRESENCE) IN URINE BY SCREEN METHOD: ABNORMAL
CODEINE: <50 NG/ML
CREATINE, URINE FOR DRUG: 229.7 MG/DL
DIAZEPAM: <25 NG/ML
DRUG SCREEN COMMENT URINE: ABNORMAL
EDDP: <25 NG/ML
FENTANYL CONFIRMATION, URINE: <2.5 NG/ML
HYDROCODONE: <25 NG/ML
HYDROMORPHONE: <25 NG/ML
LORAZEPAM: 222 NG/ML
METHADONE CONFIRMATION,URINE: <25 NG/ML
MIDAZOLAM: <25 NG/ML
MORPHINE URINE: <50 NG/ML
NORDIAZEPAM: <25 NG/ML
NORFENTANYL: <2.5 NG/ML
NORHYDROCODONE: <25 NG/ML
NOROXYCODONE: 59 NG/ML
O-DESMETHYLTRAMADOL: <50 NG/ML
OXAZEPAM: <25 NG/ML
OXYCODONE: <25 NG/ML
OXYMORPHONE: 40 NG/ML
PHENCYCLIDINE (PRESENCE) IN URINE BY SCREEN METHOD: ABNORMAL
TEMAZEPAM: <25 NG/ML
TRAMADOL: <50 NG/ML
ZOLPIDEM METABOLITE (ZCA): <25 NG/ML
ZOLPIDEM: <25 NG/ML

## 2023-07-24 ENCOUNTER — HOSPITAL ENCOUNTER (OUTPATIENT)
Dept: DATA CONVERSION | Facility: HOSPITAL | Age: 68
End: 2023-07-24
Attending: ANESTHESIOLOGY | Admitting: ANESTHESIOLOGY
Payer: COMMERCIAL

## 2023-07-24 DIAGNOSIS — M54.9 DORSALGIA, UNSPECIFIED: ICD-10-CM

## 2023-07-24 DIAGNOSIS — M46.1 SACROILIITIS, NOT ELSEWHERE CLASSIFIED (CMS-HCC): ICD-10-CM

## 2023-08-15 DIAGNOSIS — F41.1 GENERALIZED ANXIETY DISORDER: ICD-10-CM

## 2023-08-15 RX ORDER — LORAZEPAM 1 MG/1
1 TABLET ORAL DAILY PRN
Qty: 30 TABLET | Refills: 0 | Status: SHIPPED | OUTPATIENT
Start: 2023-08-15 | End: 2024-01-25 | Stop reason: SDUPTHER

## 2023-08-24 ENCOUNTER — PATIENT MESSAGE (OUTPATIENT)
Dept: PRIMARY CARE | Facility: CLINIC | Age: 68
End: 2023-08-24
Payer: COMMERCIAL

## 2023-08-24 DIAGNOSIS — Z12.31 ENCOUNTER FOR SCREENING MAMMOGRAM FOR MALIGNANT NEOPLASM OF BREAST: ICD-10-CM

## 2023-09-07 VITALS — WEIGHT: 188.49 LBS | HEIGHT: 66 IN | BODY MASS INDEX: 30.29 KG/M2

## 2023-09-11 ENCOUNTER — PATIENT MESSAGE (OUTPATIENT)
Dept: PRIMARY CARE | Facility: CLINIC | Age: 68
End: 2023-09-11
Payer: COMMERCIAL

## 2023-09-11 DIAGNOSIS — G89.29 CHRONIC BILATERAL LOW BACK PAIN, UNSPECIFIED WHETHER SCIATICA PRESENT: Primary | ICD-10-CM

## 2023-09-11 DIAGNOSIS — M54.50 CHRONIC BILATERAL LOW BACK PAIN, UNSPECIFIED WHETHER SCIATICA PRESENT: Primary | ICD-10-CM

## 2023-09-12 RX ORDER — OXYCODONE AND ACETAMINOPHEN 5; 325 MG/1; MG/1
1 TABLET ORAL EVERY 8 HOURS PRN
Qty: 30 TABLET | Refills: 0 | Status: SHIPPED | OUTPATIENT
Start: 2023-09-12 | End: 2023-10-18 | Stop reason: SDUPTHER

## 2023-09-24 DIAGNOSIS — K21.9 GASTRO-ESOPHAGEAL REFLUX DISEASE WITHOUT ESOPHAGITIS: ICD-10-CM

## 2023-09-24 RX ORDER — OMEPRAZOLE 20 MG/1
20 CAPSULE, DELAYED RELEASE ORAL DAILY
Qty: 90 CAPSULE | Refills: 3 | Status: SHIPPED | OUTPATIENT
Start: 2023-09-24

## 2023-09-29 VITALS — HEIGHT: 66 IN | BODY MASS INDEX: 30.29 KG/M2 | WEIGHT: 188.49 LBS

## 2023-10-03 ENCOUNTER — APPOINTMENT (OUTPATIENT)
Dept: PHYSICAL THERAPY | Facility: CLINIC | Age: 68
End: 2023-10-03
Payer: COMMERCIAL

## 2023-10-04 PROBLEM — M79.18 LEFT BUTTOCK PAIN: Status: ACTIVE | Noted: 2023-10-04

## 2023-10-04 PROBLEM — Z79.899 HIGH RISK MEDICATION USE: Status: ACTIVE | Noted: 2023-10-04

## 2023-10-04 PROBLEM — R30.0 DYSURIA: Status: ACTIVE | Noted: 2023-10-04

## 2023-10-04 PROBLEM — M54.51 VERTEBROGENIC LOW BACK PAIN: Status: ACTIVE | Noted: 2023-10-04

## 2023-10-04 PROBLEM — Z78.0 MENOPAUSE: Status: ACTIVE | Noted: 2023-10-04

## 2023-10-04 RX ORDER — TRIAMCINOLONE ACETONIDE 1 MG/G
OINTMENT TOPICAL
COMMUNITY
Start: 2023-09-27

## 2023-10-05 ENCOUNTER — TREATMENT (OUTPATIENT)
Dept: PHYSICAL THERAPY | Facility: CLINIC | Age: 68
End: 2023-10-05
Payer: COMMERCIAL

## 2023-10-05 DIAGNOSIS — M79.18 LEFT BUTTOCK PAIN: ICD-10-CM

## 2023-10-05 DIAGNOSIS — M54.9 BACK PAIN: Primary | ICD-10-CM

## 2023-10-05 PROCEDURE — 97112 NEUROMUSCULAR REEDUCATION: CPT | Mod: GP | Performed by: PHYSICAL THERAPIST

## 2023-10-05 PROCEDURE — 97110 THERAPEUTIC EXERCISES: CPT | Mod: GP | Performed by: PHYSICAL THERAPIST

## 2023-10-05 PROCEDURE — 97140 MANUAL THERAPY 1/> REGIONS: CPT | Mod: GP | Performed by: PHYSICAL THERAPIST

## 2023-10-05 ASSESSMENT — PAIN - FUNCTIONAL ASSESSMENT: PAIN_FUNCTIONAL_ASSESSMENT: 0-10

## 2023-10-05 ASSESSMENT — PAIN SCALES - GENERAL: PAINLEVEL_OUTOF10: 3

## 2023-10-05 NOTE — PROGRESS NOTES
Physical Therapy  Physical Therapy Treatment    Patient Name: Ac Torres  MRN: [id number]  Today's Date: 10/5/2023  Time Calculation  Start Time: 1415  Stop Time: 1510  Time Calculation (min): 55 min    Insurance:  Visit number: 6 of [place]  Authorization info: Medicare Certification Period 8/17/23-11/15/23  Insurance Type: Primary UHC; Secondary M[employer] Medicare    General:  Reason for visit: Chronic LBP  Referred by: Dr. Chin    Assessment: PT and pt discussing use of gym equipment at [facility] to continue strength training independently once discharged from PT. Pt verbalizing good understanding. Attempting squat to elevated table, however, pt experiencing R knee pain. Attempting wall squat through modified range, pt continuing to experience knee pain, thus discontinuing. Tolerating remaining exercises well with mild R knee pain after standing hip ext LLE d/t prolonged WBing through RLE. Overall, good tolerance to progressions made in clinic. At end of session, pt reports 4/10 L buttock pain with moderate R knee pain. Encouraged to utilize pool for symptom management as needed.        Plan: Gradually progress core stabilization and general core/hip strength to tolerance, monitoring R knee tolerance. Recheck in 2 visits.       Current Problem  1. Back pain        2. Left buttock pain            Precautions:   Precautions  Precautions Comment: HBP, paroxysmal atrial tachycardia    Subjective:  Subjective   Pt presents to PT with 3/10 L buttock pain. Notes improvement with POC. Felt better after last session. Performing HEP every other day. Walking 2.5 miles without increase in pain if wearing SI belt. Sees pain management tomorrow, does not believe she needs an injection. Believes she'll be ready for discharge in 2 visits. Of note, has not returned to pool at [facility], stating she likely won't go when it's cold outside.    Pain  Pain Assessment: 0-10  Pain Score: 3  Pain Location: Buttocks (Left)    Performing HEP?:  "Yes    Objective:  Objective   Excessive ant tib translation with DL squat    Treatments:  Therapeutic Exercise  Therapeutic Exercise Performed: Yes  Therapeutic Exercise Activity 1: Stepper L2 5'  Therapeutic Exercise Activity 2: Resisted hip abd 25# 2x15  Therapeutic Exercise Activity 3: Resisted hip add 25# 2x15  Therapeutic Exercise Activity 4: Supine flies 1# x15  Therapeutic Exercise Activity 5: BKFO x15  Therapeutic Exercise Activity 6: Palloff press 5# 10x10\" holds  Therapeutic Exercise Activity 7: Hip ext machine 33lb 1-2x15    Balance/Neuromuscular Re-Education  Balance/Neuromuscular Re-Education Activity Performed: Yes  Balance/Neuromuscular Re-Education Activity 1: DBE 2' ea  Balance/Neuromuscular Re-Education Activity 2: Tandem 4kg KB x10 cw/ccw R/L    Manual Therapy  Manual Therapy Performed: Yes  Manual Therapy Activity 1: Theragun L glut min/piriformis 5'      HEP Access Code: [email or web address]    Ruma Doyle, PT  "

## 2023-10-06 ENCOUNTER — OFFICE VISIT (OUTPATIENT)
Dept: PAIN MEDICINE | Facility: CLINIC | Age: 68
End: 2023-10-06
Payer: COMMERCIAL

## 2023-10-06 VITALS
DIASTOLIC BLOOD PRESSURE: 75 MMHG | SYSTOLIC BLOOD PRESSURE: 120 MMHG | WEIGHT: 186.2 LBS | HEIGHT: 66 IN | TEMPERATURE: 97 F | OXYGEN SATURATION: 99 % | BODY MASS INDEX: 29.92 KG/M2 | HEART RATE: 73 BPM | RESPIRATION RATE: 16 BRPM

## 2023-10-06 DIAGNOSIS — M46.1 SACROILIITIS (CMS-HCC): Primary | ICD-10-CM

## 2023-10-06 DIAGNOSIS — M47.816 LUMBAR SPONDYLOSIS: ICD-10-CM

## 2023-10-06 PROCEDURE — 99214 OFFICE O/P EST MOD 30 MIN: CPT | Performed by: ANESTHESIOLOGY

## 2023-10-06 PROCEDURE — 3078F DIAST BP <80 MM HG: CPT | Performed by: ANESTHESIOLOGY

## 2023-10-06 PROCEDURE — 3074F SYST BP LT 130 MM HG: CPT | Performed by: ANESTHESIOLOGY

## 2023-10-06 PROCEDURE — 1159F MED LIST DOCD IN RCRD: CPT | Performed by: ANESTHESIOLOGY

## 2023-10-06 PROCEDURE — 1125F AMNT PAIN NOTED PAIN PRSNT: CPT | Performed by: ANESTHESIOLOGY

## 2023-10-06 PROCEDURE — 1036F TOBACCO NON-USER: CPT | Performed by: ANESTHESIOLOGY

## 2023-10-06 ASSESSMENT — PAIN - FUNCTIONAL ASSESSMENT: PAIN_FUNCTIONAL_ASSESSMENT: 0-10

## 2023-10-06 ASSESSMENT — COLUMBIA-SUICIDE SEVERITY RATING SCALE - C-SSRS
2. HAVE YOU ACTUALLY HAD ANY THOUGHTS OF KILLING YOURSELF?: NO
6. HAVE YOU EVER DONE ANYTHING, STARTED TO DO ANYTHING, OR PREPARED TO DO ANYTHING TO END YOUR LIFE?: NO
1. IN THE PAST MONTH, HAVE YOU WISHED YOU WERE DEAD OR WISHED YOU COULD GO TO SLEEP AND NOT WAKE UP?: NO

## 2023-10-06 ASSESSMENT — PAIN DESCRIPTION - DESCRIPTORS: DESCRIPTORS: ACHING

## 2023-10-06 ASSESSMENT — ENCOUNTER SYMPTOMS
LOSS OF SENSATION IN FEET: 0
CARDIOVASCULAR NEGATIVE: 1
ENDOCRINE NEGATIVE: 1
BACK PAIN: 1
GASTROINTESTINAL NEGATIVE: 1
PSYCHIATRIC NEGATIVE: 1
DEPRESSION: 0
EYES NEGATIVE: 1
HEMATOLOGIC/LYMPHATIC NEGATIVE: 1
OCCASIONAL FEELINGS OF UNSTEADINESS: 0
NEUROLOGICAL NEGATIVE: 1
RESPIRATORY NEGATIVE: 1

## 2023-10-06 ASSESSMENT — PATIENT HEALTH QUESTIONNAIRE - PHQ9
2. FEELING DOWN, DEPRESSED OR HOPELESS: NOT AT ALL
1. LITTLE INTEREST OR PLEASURE IN DOING THINGS: NOT AT ALL
SUM OF ALL RESPONSES TO PHQ9 QUESTIONS 1 AND 2: 0

## 2023-10-06 ASSESSMENT — LIFESTYLE VARIABLES: TOTAL SCORE: 0

## 2023-10-06 ASSESSMENT — PAIN SCALES - GENERAL
PAINLEVEL_OUTOF10: 3
PAINLEVEL: 3

## 2023-10-06 NOTE — PROGRESS NOTES
This is 68 y.o.  female with who has been treated for Lower back pain. Pain is better, The pain is described as achiness and is relieved by Lying down and Medications percocet  . Here for follow-up .Patient here for follow-up ,she did have a left SI injection on July 24, 2023 she states that she had great improvement with that injection.  She had 80% pain relief for a month or 2 . She states she feels the pain now but it is very minimal rates her pain 3 out of 10 is currently doing water therapy doing well.  She does have some discomfort she describes it as aching in nature.  Chief Complaint   Patient presents with    Follow-up    Back Pain       Pain Therapies:  Percocet patient also does physical therapy and aquatic therapy rest at home.  Stays active and mobile to decrease pain

## 2023-10-06 NOTE — PROGRESS NOTES
SUBJECTIVE:  This is 68 y.o.  female with PMH of obesity BMI of 31, anxiety, hypertension, chronic lower back pain on hydrocodone from PCP who has been treated for Lower back pain and RT sacroiliitis . Pain is better, The pain is described as achiness and is relieved by Lying down with who is here for follow-up very happy with the result of her SI joint injection with totally improved pain in the right gluteal area.  The patient is in physical therapy and she already bought SI joint belt and that is helping her a lot when she walks.  Patient to continue her physical therapy and self related exercises and she can call the office to repeat the injection when it is needed.        Prior office visit:  6/21/2023 Adolfo Chin note:  This is 67 year year old female recall past medical history of anxiety, hypertension, chronic lower back pain on hydrocodone from PCP. Follow-up for left SI joint injection in April which provided 50% relief for roughly 4 to 5 weeks. Patient was able to complete ADLs and increase her quality of life. She is very active walks 2 miles a day. Her left lower back pain has returned. She manages her pain with Aleve the occasional hydrocodone-acetaminophen for breakthrough pain. On physical examination she has pain on the left sacral notch, positive left Solitario Eduardo left sacroiliac thigh thrust. Symptoms consistent with sacroiliitis we will plan for left SI joint steroid injection. Provided referral for aqua therapy and can advance to land therapy when indicated by therapist. Encouraged home stretching as tolerated.      Recall office visit April 4, 2023  This is 67 year year old female recall past medical history of anxiety, hypertension, chronic lower back pain on hydrocodone from PCP and oxcarbazepine 600 mg twice daily from us who had left SI joint injection in December last year which helped her significantly who is here for follow-up stating that the left SI injection had helped her  significantly until recently and now it is really bad again and she would like another injection. The patient had bad right knee arthritis but she is going for a 3 gel injection at [facility] by her orthopedic surgeon. I discussed with her Zilretta injection if the other one failed.      Last procedure:   7/24/2023 left SI joint injection the patient has had 80% improvement in pain and function  4/24/2023 left SI joint injection has had a 50% relief for 4 to 5 weeks  11/7/2022 left SI injection the patient has had a 100% improvement in pain and function for almost 2 months  9/19/2022 left SI injection the patient has had a 100% improvement in pain and function and the pain just came back  8/8/2022 left L3-5 MBB the patient has had a 70% improvement in pain and function  Caudal and bilateral L5-S1 interlaminar JULIO C by Lydia the patient has had helped with the legs numbness but never affected the lower back pain which is her major problem    Portions of record reviewed for pertinent issues: active problem list, medication list, allergies, family history, social history, notes from last encounter, encounters, lab results, imaging and other system records.        I have personally reviewed the [facility] report for this patient. This report is scanned into the electronic medical record. I have considered the risks of abuse, dependence, addiction and diversion. It showed hydrocodone 5 mg twice daily from Heriberto Keller MD   OPIOID RISK ASSESSMENT SCORE 0/26  Aberrant behavior: None      Social history: The patient is retired, she has 3 children and 7 grandchildren, finished few college classes denies smoking drinking or use of illicit drugs    Diagnostic studies:    7/19/2022 cervical x-ray showed mild spondylotic changes with mild degeneration in the lower cervical spine    12/8/2021 EMG of the left leg was within normal limits    11/6/2021 MRI of the lumbar spine did not show any bone marrow edema or endplate changes, grade 1  "spondylosis at the L4-5 but no canal stenosis no foraminal stenosis but significant facet hypertrophy and spondylosis worse at the L4-5 and L5-S1:  FINDINGS:  Mild levoscoliosis noted. Conus medullaris terminates at the level of  the L1 vertebral body. Visualized portions of the conus and the  spinal cord shows normal signal intensity and caliber.    Vertebral body heights are well maintained. Mild heterogeneous bone  marrow signal intensity is normal. Trace anterolisthesis of L4 on L5,  L3 on L4.    At T12-L1 trace retrolisthesis of T12 on L1, diffuse disc bulge, mild  facet joint arthropathy in combination noted causing mild central  spinal canal stenosis and minimal encroachment of the bilateral  foramina.    At L1-L2 mild disc bulge, facet joint arthropathy in combination  noted causing mild central spinal canal stenosis, and mild bilateral  neural foramina narrowing.    At L2-L3 mild disc bulge, facet joint arthropathy, ligamentum flavum  hypertrophy noted causing minimal encroachment of the spinal canal  and bilateral neural foramina.    At L3-L4 trace anterolisthesis of L3 on L4, minimal disc bulge,  significant facet joint arthropathy, mild ligamentum flavum  hypertrophy, no significant central spinal canal or neural foramina  stenosis.    At L4-L5 grade 1 anterolisthesis of L4 on L5, significant facet joint  arthropathy noted causing mild encroachment of the spinal canal and  right neural foramina.    At L5-S1 mild facet joint arthropathy noted, mild disc bulge, without  any significant central spinal canal or neural foramina stenosis.    IMPRESSION:  There is mild degenerative spinal canal or neural foramina stenosis  at T12-L1, L1-L2, L2-L3.        /75 (BP Location: Left arm, Patient Position: Sitting, BP Cuff Size: Adult)   Pulse 73   Temp 36.1 °C (97 °F) (Temporal)   Resp 16   Ht 1.676 m (5' 6\")   Wt 84.5 kg (186 lb 3.2 oz)   SpO2 99%   BMI 30.05 kg/m²    Review of Systems   HENT: Negative. "     Eyes: Negative.    Respiratory: Negative.     Cardiovascular: Negative.    Gastrointestinal: Negative.    Endocrine: Negative.    Genitourinary: Negative.    Musculoskeletal:  Positive for back pain.   Skin: Negative.    Neurological: Negative.    Hematological: Negative.    Psychiatric/Behavioral: Negative.        Physical Exam  Vitals and nursing note reviewed.   Constitutional:       Appearance: Normal appearance.   HENT:      Head: Normocephalic and atraumatic.      Nose: Nose normal.   Eyes:      Extraocular Movements: Extraocular movements intact.      Conjunctiva/sclera: Conjunctivae normal.      Pupils: Pupils are equal, round, and reactive to light.   Cardiovascular:      Rate and Rhythm: Normal rate and regular rhythm.      Pulses: Normal pulses.      Heart sounds: Normal heart sounds.   Pulmonary:      Effort: Pulmonary effort is normal.      Breath sounds: Normal breath sounds.   Abdominal:      General: Abdomen is flat. Bowel sounds are normal.      Palpations: Abdomen is soft.   Musculoskeletal:         General: Tenderness present.   Skin:     General: Skin is warm.   Neurological:      General: No focal deficit present.      Mental Status: She is alert.      Deep Tendon Reflexes: Reflexes are normal and symmetric.   Psychiatric:         Mood and Affect: Mood normal.         Behavior: Behavior normal.                        Plan  At least 50% of the visit was involved in the discussion of the options for treatment. We discussed exercises, medication, interventional therapies and surgery. Healthy life style is essential with patient hard work to achieve the wellness. In addition; discussion with the patient and/or family about any of the diagnostic results, impressions and/or recommended diagnostic studies, prognosis, risks and benefits of treatment options, instructions for treatment and/or follow-up, importance of compliance with chosen treatment options, risk-factor reduction, and patient/family  education.         Pool therapy, walking in the pool, at least 3x per week for 30 minutes  Continue self-directed physical therapy  Repeat left SI joint injection when patient calls  Continue weight control  Alternative chronic pain therapies was discussed, encouraged and information was handed  Return to Clinic 3 months     *Please note this report has been produced using speech recognition software and may contain errors related to that system including grammar, punctuation and spelling as well as words and phrases that may be inappropriate. If there are questions or concerns, please feel free to contact me to clarify.    Stacey Flaherty MD

## 2023-10-11 ENCOUNTER — OFFICE VISIT (OUTPATIENT)
Dept: PRIMARY CARE | Facility: CLINIC | Age: 68
End: 2023-10-11
Payer: COMMERCIAL

## 2023-10-11 VITALS
HEART RATE: 86 BPM | BODY MASS INDEX: 30.49 KG/M2 | OXYGEN SATURATION: 96 % | DIASTOLIC BLOOD PRESSURE: 72 MMHG | TEMPERATURE: 97.4 F | WEIGHT: 183 LBS | SYSTOLIC BLOOD PRESSURE: 110 MMHG | HEIGHT: 65 IN

## 2023-10-11 DIAGNOSIS — J40 BRONCHITIS: Primary | ICD-10-CM

## 2023-10-11 PROCEDURE — 1036F TOBACCO NON-USER: CPT | Performed by: INTERNAL MEDICINE

## 2023-10-11 PROCEDURE — 3074F SYST BP LT 130 MM HG: CPT | Performed by: INTERNAL MEDICINE

## 2023-10-11 PROCEDURE — 1125F AMNT PAIN NOTED PAIN PRSNT: CPT | Performed by: INTERNAL MEDICINE

## 2023-10-11 PROCEDURE — 99213 OFFICE O/P EST LOW 20 MIN: CPT | Performed by: INTERNAL MEDICINE

## 2023-10-11 PROCEDURE — 1159F MED LIST DOCD IN RCRD: CPT | Performed by: INTERNAL MEDICINE

## 2023-10-11 PROCEDURE — 3078F DIAST BP <80 MM HG: CPT | Performed by: INTERNAL MEDICINE

## 2023-10-11 RX ORDER — AZITHROMYCIN 250 MG/1
TABLET, FILM COATED ORAL
Qty: 6 TABLET | Refills: 0 | Status: SHIPPED | OUTPATIENT
Start: 2023-10-11 | End: 2023-10-16

## 2023-10-11 RX ORDER — PREDNISONE 10 MG/1
TABLET ORAL
Qty: 30 TABLET | Refills: 0 | Status: SHIPPED | OUTPATIENT
Start: 2023-10-11 | End: 2023-10-21

## 2023-10-11 RX ORDER — CODEINE PHOSPHATE AND GUAIFENESIN 10; 100 MG/5ML; MG/5ML
5 SOLUTION ORAL EVERY 6 HOURS PRN
Qty: 240 ML | Refills: 0 | Status: SHIPPED | OUTPATIENT
Start: 2023-10-11 | End: 2023-10-21

## 2023-10-11 NOTE — PROGRESS NOTES
"PCP: Heriberto Keller MD   I have reviewed existing histories, notes, past medical history, surgical history, social history, family history, med list, allergy list, and immunization, and updated.    HPI:   Coughing, running nose, blowing nose since one month ago, when her daughter came to visit her. Daughter had a cough too. No Fever and chills  She started to get wheezing. Continued to have cough, chest congestion. In am mucus is slightly yellow. No hx of asthma. Does get wheezy when she would have URI.        Lab Results   Component Value Date    WBC 6.5 07/10/2023    HGB 13.4 07/10/2023    HCT 43.4 07/10/2023     07/10/2023    CHOL 241 (H) 07/10/2023    TRIG 178 (H) 07/10/2023    HDL 49.6 07/10/2023    ALT 16 07/10/2023    AST 17 07/10/2023     07/10/2023    K 4.7 07/10/2023     07/10/2023    CREATININE 1.06 (H) 07/10/2023    BUN 15 07/10/2023    CO2 29 07/10/2023    TSH 2.22 07/10/2023    HGBA1C 6.1 (A) 07/10/2023     Physical exam:  /72   Pulse 86   Temp 36.3 °C (97.4 °F)   Ht 1.651 m (5' 5\")   Wt 83 kg (183 lb)   SpO2 96%   BMI 30.45 kg/m²     Pt constantly need to clear throat, coughing.  No fever.  Throat slightly erythema  TM normal  Heart RR  Lungs mild basal wheezing   No leg edema.    Assessments/orders:   1. Bronchitis  azithromycin (Zithromax) 250 mg tablet, predniSONE (Deltasone) 10 mg tablet, codeine-guaifenesin (Robitussin-AC)  mg/5 mL syrup      Continue albuterol inhaler and Mucinex. See rx  Follow up prn             "

## 2023-10-12 ENCOUNTER — APPOINTMENT (OUTPATIENT)
Dept: PHYSICAL THERAPY | Facility: CLINIC | Age: 68
End: 2023-10-12
Payer: COMMERCIAL

## 2023-10-17 ENCOUNTER — APPOINTMENT (OUTPATIENT)
Dept: PHYSICAL THERAPY | Facility: CLINIC | Age: 68
End: 2023-10-17
Payer: COMMERCIAL

## 2023-10-18 ENCOUNTER — PATIENT MESSAGE (OUTPATIENT)
Dept: PRIMARY CARE | Facility: CLINIC | Age: 68
End: 2023-10-18

## 2023-10-18 ENCOUNTER — TREATMENT (OUTPATIENT)
Dept: PHYSICAL THERAPY | Facility: CLINIC | Age: 68
End: 2023-10-18
Payer: COMMERCIAL

## 2023-10-18 DIAGNOSIS — G89.29 CHRONIC BILATERAL LOW BACK PAIN, UNSPECIFIED WHETHER SCIATICA PRESENT: ICD-10-CM

## 2023-10-18 DIAGNOSIS — M79.18 LEFT BUTTOCK PAIN: Primary | ICD-10-CM

## 2023-10-18 DIAGNOSIS — M54.9 BACK PAIN: ICD-10-CM

## 2023-10-18 DIAGNOSIS — M54.50 CHRONIC BILATERAL LOW BACK PAIN, UNSPECIFIED WHETHER SCIATICA PRESENT: ICD-10-CM

## 2023-10-18 DIAGNOSIS — J32.1 CHRONIC FRONTAL SINUSITIS: Primary | ICD-10-CM

## 2023-10-18 PROCEDURE — 97110 THERAPEUTIC EXERCISES: CPT | Mod: GP | Performed by: PHYSICAL THERAPIST

## 2023-10-18 PROCEDURE — 97140 MANUAL THERAPY 1/> REGIONS: CPT | Mod: GP | Performed by: PHYSICAL THERAPIST

## 2023-10-18 RX ORDER — OXYCODONE AND ACETAMINOPHEN 5; 325 MG/1; MG/1
1 TABLET ORAL EVERY 8 HOURS PRN
Qty: 30 TABLET | Refills: 0 | Status: SHIPPED | OUTPATIENT
Start: 2023-10-18 | End: 2023-11-20 | Stop reason: SDUPTHER

## 2023-10-18 RX ORDER — AZITHROMYCIN 250 MG/1
TABLET, FILM COATED ORAL
Qty: 6 TABLET | Refills: 0 | Status: SHIPPED | OUTPATIENT
Start: 2023-10-18 | End: 2023-10-23

## 2023-10-18 ASSESSMENT — PAIN - FUNCTIONAL ASSESSMENT: PAIN_FUNCTIONAL_ASSESSMENT: 0-10

## 2023-10-18 ASSESSMENT — PAIN SCALES - GENERAL: PAINLEVEL_OUTOF10: 4

## 2023-10-18 NOTE — PROGRESS NOTES
Physical Therapy  Physical Therapy Orthopedic Discharge Note    Patient Name: Ac Torres  MRN: [id number]  Today's Date: 10/18/2023  Time Calculation  Start Time: 1105  Stop Time: 1135  Time Calculation (min): 30 min    Insurance:  Visit number: 7 of [place]  Authorization info: Medicare Certification Period: 8/17/23-11/15/23  Insurance Type: Primary U[employer]; Secondary M[employer] Medicare    General:  Reason for visit: Chronic LBP  Referred by: Dr. Chin    Assessment: Pt demonstrating good progress with PT POC, reporting 85% improvement with decrease in pain intensity/frequency and improved strength and activity tolerance. Demonstrating improved LE strength, gait pattern, and HAYDE score improving by 4 points. Demonstrates good understanding of HEP, thus appropriate to discharge to I [facility]. Pt in agreement.        Plan: Updated 10/18/2023     Discharge to I HEP     Current Problem  1. Left buttock pain        2. Back pain            Precautions:  Precautions  Precautions Comment: HBP, paroxysmal atrial tachycardia      Subjective:  Subjective   Patient reports 4/10 L LBP upon arrival secondary to increased coughing as a result of bronchitis. Notes her L buttock pain has improved over the past few weeks, not exceeding 3/10. Also, reports improved strength and activity tolerance, requiring 40-50% less seated rest breaks during IADLs. States she is walking up to 2.5 miles/day while donning SIJ belt. Saw pain management recently, declining injections d/t decrease in symptoms. Reports HEP adherence. Agreeable to discharge to I HEP.    Current Condition:   Better    Pain:  Pain Assessment: 0-10  Pain Score: 4  Pain Location: Back    Location: L buttock  Description: Aching  Aggravating Factors: Walking and Sitting  Relieving Factors: Exercises    Self Reported Function (0-100%) = 85%  - 100% being back to PLOF    Objective:  Objective     Lumbar AROM %  Flexion 80%  Extension 50% L sided LBP   Right SB 50% L sided LBP  Left SB 50% L sided  LBP (more severe)  Right ROT 75%  Left ROT 75% slight L sided LBP    Lower Extremity MMT  Hip Flexion R 4/5; L 4/5  Hip Extension R 4/5; L 4-/5 (L sided LBP)  Hip Abduction R 4/5; L 4/5  Hip IR R 4+/5; L 4+/5  Hip ER R 4+/5; L 4+/5    Special Test  SLR R (-); L (-)    Gait Analysis : decreased bella     Outcome Measures: Updated 10/18/2023   HAYDE 8/50    EDUCATION: home exercise program, plan of care, activity modifications, pain management, and injury pathology       Goals: Updated 10/18/2023  Active       PT Problem       PT Goal 1       Start:  10/05/23    Expected End:  11/04/23       Patient demonstrate home exercise program adherence to supplement symptom reduction and functional gains made in clinic in 3 weeks - MET         PT Goal 2       Start:  10/05/23    Expected End:  12/04/23       Patient will verbalize pain (0-10) <1/10 at best, and <4/10 at worst to improve ADL tolerance in 10 weeks - MET  Pt will demonstrate pain-free lumbar AROM grossly WFL to improve joint mechanics during ADLs in 10 weeks - NOT MET  Patient will demonstrate LLE strength 4-/5 MMT to improve ease with functional mobility in 10 weeks - MET  Pt will demonstrate MCID (6 points) improvement on Modified Oswestry score (< 6 points or 12%) to demonstrate overall improved functional abilities in 6 weeks - PARTIALLY MET  Pt will report 50% reduction in seated rest breaks required during HH chores in 10 weeks - MET             Treatments:   Recheck performed, see objective measures  Reviewed HEP and [facility]     HEP Access Code: [email or web address]     Ruma Doyle, PT

## 2023-10-24 ENCOUNTER — PATIENT MESSAGE (OUTPATIENT)
Dept: PRIMARY CARE | Facility: CLINIC | Age: 68
End: 2023-10-24
Payer: COMMERCIAL

## 2023-10-24 DIAGNOSIS — E78.5 HYPERLIPIDEMIA, UNSPECIFIED HYPERLIPIDEMIA TYPE: Primary | ICD-10-CM

## 2023-10-24 DIAGNOSIS — R73.03 PRE-DIABETES: ICD-10-CM

## 2023-10-24 NOTE — TELEPHONE ENCOUNTER
From: Ac Torres  To: Heriberto Keller MD  Sent: 10/24/2023 7:08 AM EDT  Subject: Lab work    Good morning! Is the lab work in the system that I need to have repeated this month?

## 2023-11-02 ENCOUNTER — TELEPHONE (OUTPATIENT)
Dept: PRIMARY CARE | Facility: CLINIC | Age: 68
End: 2023-11-02

## 2023-11-02 ENCOUNTER — LAB (OUTPATIENT)
Dept: LAB | Facility: LAB | Age: 68
End: 2023-11-02
Payer: COMMERCIAL

## 2023-11-02 DIAGNOSIS — R73.03 PRE-DIABETES: ICD-10-CM

## 2023-11-02 DIAGNOSIS — E78.5 HYPERLIPIDEMIA, UNSPECIFIED HYPERLIPIDEMIA TYPE: ICD-10-CM

## 2023-11-02 LAB
CHOLEST SERPL-MCNC: 214 MG/DL (ref 0–199)
CHOLESTEROL/HDL RATIO: 4.9
EST. AVERAGE GLUCOSE BLD GHB EST-MCNC: 117 MG/DL
HBA1C MFR BLD: 5.7 %
HDLC SERPL-MCNC: 43.6 MG/DL
LDLC SERPL CALC-MCNC: 141 MG/DL
NON HDL CHOLESTEROL: 170 MG/DL (ref 0–149)
TRIGL SERPL-MCNC: 148 MG/DL (ref 0–149)
VLDL: 30 MG/DL (ref 0–40)

## 2023-11-02 PROCEDURE — 80061 LIPID PANEL: CPT

## 2023-11-02 PROCEDURE — 83036 HEMOGLOBIN GLYCOSYLATED A1C: CPT

## 2023-11-02 PROCEDURE — 36415 COLL VENOUS BLD VENIPUNCTURE: CPT

## 2023-11-02 NOTE — TELEPHONE ENCOUNTER
Let pt know her labs showed improvement in hba1c, and the cholesterol is similar to before.  Continue healthy diet and exercises      She will need 3 months Follow up in January. Please schedule appointment (for controlled med)=

## 2023-11-14 ENCOUNTER — PATIENT MESSAGE (OUTPATIENT)
Dept: PAIN MEDICINE | Facility: CLINIC | Age: 68
End: 2023-11-14
Payer: COMMERCIAL

## 2023-11-14 DIAGNOSIS — M46.1 SACROILIITIS (CMS-HCC): Primary | ICD-10-CM

## 2023-11-16 ENCOUNTER — PATIENT MESSAGE (OUTPATIENT)
Dept: PRIMARY CARE | Facility: CLINIC | Age: 68
End: 2023-11-16
Payer: COMMERCIAL

## 2023-11-16 DIAGNOSIS — F41.1 GENERALIZED ANXIETY DISORDER: ICD-10-CM

## 2023-11-16 DIAGNOSIS — G89.29 CHRONIC BILATERAL LOW BACK PAIN, UNSPECIFIED WHETHER SCIATICA PRESENT: ICD-10-CM

## 2023-11-16 DIAGNOSIS — M54.50 CHRONIC BILATERAL LOW BACK PAIN, UNSPECIFIED WHETHER SCIATICA PRESENT: ICD-10-CM

## 2023-11-16 RX ORDER — LORAZEPAM 1 MG/1
1 TABLET ORAL DAILY PRN
Qty: 30 TABLET | Refills: 0 | Status: CANCELLED | OUTPATIENT
Start: 2023-11-16 | End: 2024-05-14

## 2023-11-16 RX ORDER — LORAZEPAM 2 MG/1
TABLET ORAL
Qty: 1 TABLET | Refills: 0 | Status: SHIPPED | OUTPATIENT
Start: 2023-11-16 | End: 2024-02-28 | Stop reason: ALTCHOICE

## 2023-11-20 RX ORDER — OXYCODONE AND ACETAMINOPHEN 5; 325 MG/1; MG/1
1 TABLET ORAL EVERY 8 HOURS PRN
Qty: 30 TABLET | Refills: 0 | Status: SHIPPED | OUTPATIENT
Start: 2023-11-20 | End: 2024-01-11 | Stop reason: SDUPTHER

## 2023-11-20 NOTE — TELEPHONE ENCOUNTER
From: Ac Torres  To: Heriberto Keller MD  Sent: 11/16/2023 10:29 PM EST  Subject: Rx refill     Have pain radiating down my leg from my back scheduled for a procedure Dec 4th  Oxycodone acetaminophen 5-325 mg

## 2023-12-04 ENCOUNTER — APPOINTMENT (OUTPATIENT)
Dept: RADIOLOGY | Facility: CLINIC | Age: 68
End: 2023-12-04
Payer: COMMERCIAL

## 2023-12-04 ENCOUNTER — APPOINTMENT (OUTPATIENT)
Dept: PAIN MEDICINE | Facility: CLINIC | Age: 68
End: 2023-12-04
Payer: COMMERCIAL

## 2023-12-11 DIAGNOSIS — I10 PRIMARY HYPERTENSION: Primary | ICD-10-CM

## 2023-12-11 RX ORDER — METOPROLOL SUCCINATE 25 MG/1
25 TABLET, EXTENDED RELEASE ORAL DAILY
Qty: 90 TABLET | Refills: 3 | Status: SHIPPED | OUTPATIENT
Start: 2023-12-11 | End: 2024-02-15 | Stop reason: SDUPTHER

## 2024-01-11 DIAGNOSIS — G89.29 CHRONIC BILATERAL LOW BACK PAIN, UNSPECIFIED WHETHER SCIATICA PRESENT: ICD-10-CM

## 2024-01-11 DIAGNOSIS — M54.50 CHRONIC BILATERAL LOW BACK PAIN, UNSPECIFIED WHETHER SCIATICA PRESENT: ICD-10-CM

## 2024-01-11 RX ORDER — OXYCODONE AND ACETAMINOPHEN 5; 325 MG/1; MG/1
1 TABLET ORAL EVERY 8 HOURS PRN
Qty: 30 TABLET | Refills: 0 | Status: SHIPPED | OUTPATIENT
Start: 2024-01-11 | End: 2024-02-28 | Stop reason: SDUPTHER

## 2024-01-12 DIAGNOSIS — L50.8 OTHER URTICARIA: ICD-10-CM

## 2024-01-12 RX ORDER — LEVOCETIRIZINE DIHYDROCHLORIDE 5 MG/1
5 TABLET, FILM COATED ORAL 2 TIMES DAILY
Qty: 180 TABLET | Refills: 2 | Status: SHIPPED | OUTPATIENT
Start: 2024-01-12 | End: 2024-03-12 | Stop reason: WASHOUT

## 2024-01-15 ENCOUNTER — ANCILLARY PROCEDURE (OUTPATIENT)
Dept: RADIOLOGY | Facility: CLINIC | Age: 69
End: 2024-01-15
Payer: COMMERCIAL

## 2024-01-15 ENCOUNTER — HOSPITAL ENCOUNTER (OUTPATIENT)
Dept: PAIN MEDICINE | Facility: CLINIC | Age: 69
Discharge: HOME | End: 2024-01-15
Payer: COMMERCIAL

## 2024-01-15 VITALS
TEMPERATURE: 95.2 F | SYSTOLIC BLOOD PRESSURE: 134 MMHG | OXYGEN SATURATION: 100 % | HEART RATE: 76 BPM | DIASTOLIC BLOOD PRESSURE: 89 MMHG | RESPIRATION RATE: 16 BRPM

## 2024-01-15 DIAGNOSIS — M46.1 SACROILIITIS (CMS-HCC): ICD-10-CM

## 2024-01-15 PROCEDURE — 27096 INJECT SACROILIAC JOINT: CPT | Performed by: ANESTHESIOLOGY

## 2024-01-15 PROCEDURE — 77003 FLUOROGUIDE FOR SPINE INJECT: CPT

## 2024-01-15 PROCEDURE — 2500000005 HC RX 250 GENERAL PHARMACY W/O HCPCS

## 2024-01-15 PROCEDURE — 2500000004 HC RX 250 GENERAL PHARMACY W/ HCPCS (ALT 636 FOR OP/ED)

## 2024-01-15 RX ORDER — TRIAMCINOLONE ACETONIDE 40 MG/ML
INJECTION, SUSPENSION INTRA-ARTICULAR; INTRAMUSCULAR
Status: COMPLETED
Start: 2024-01-15 | End: 2024-01-15

## 2024-01-15 RX ORDER — BUPIVACAINE HYDROCHLORIDE 7.5 MG/ML
INJECTION, SOLUTION EPIDURAL; RETROBULBAR
Status: COMPLETED
Start: 2024-01-15 | End: 2024-01-15

## 2024-01-15 RX ORDER — LIDOCAINE HYDROCHLORIDE 5 MG/ML
INJECTION, SOLUTION INFILTRATION; INTRAVENOUS
Status: COMPLETED
Start: 2024-01-15 | End: 2024-01-15

## 2024-01-15 RX ADMIN — BUPIVACAINE HYDROCHLORIDE 75 MG: 7.5 INJECTION, SOLUTION EPIDURAL; RETROBULBAR at 09:16

## 2024-01-15 RX ADMIN — LIDOCAINE HYDROCHLORIDE 250 MG: 5 INJECTION, SOLUTION INFILTRATION at 09:15

## 2024-01-15 RX ADMIN — TRIAMCINOLONE ACETONIDE 40 MG: 40 INJECTION, SUSPENSION INTRA-ARTICULAR; INTRAMUSCULAR at 09:15

## 2024-01-15 ASSESSMENT — ENCOUNTER SYMPTOMS
CARDIOVASCULAR NEGATIVE: 1
BACK PAIN: 1
EYES NEGATIVE: 1
ENDOCRINE NEGATIVE: 1
NEUROLOGICAL NEGATIVE: 1
PSYCHIATRIC NEGATIVE: 1
HEMATOLOGIC/LYMPHATIC NEGATIVE: 1
GASTROINTESTINAL NEGATIVE: 1
RESPIRATORY NEGATIVE: 1

## 2024-01-15 ASSESSMENT — PAIN SCALES - GENERAL: PAINLEVEL_OUTOF10: 8

## 2024-01-15 ASSESSMENT — PAIN - FUNCTIONAL ASSESSMENT: PAIN_FUNCTIONAL_ASSESSMENT: 0-10

## 2024-01-15 ASSESSMENT — COLUMBIA-SUICIDE SEVERITY RATING SCALE - C-SSRS
6. HAVE YOU EVER DONE ANYTHING, STARTED TO DO ANYTHING, OR PREPARED TO DO ANYTHING TO END YOUR LIFE?: NO
2. HAVE YOU ACTUALLY HAD ANY THOUGHTS OF KILLING YOURSELF?: NO
1. IN THE PAST MONTH, HAVE YOU WISHED YOU WERE DEAD OR WISHED YOU COULD GO TO SLEEP AND NOT WAKE UP?: NO

## 2024-01-15 NOTE — PROGRESS NOTES
Ac Torres is a 68 y.o. female on day 0 of admission presenting with No Principal Problem: There is no principal problem currently on the Problem List. Please update the Problem List and refresh..    Subjective          Objective     Physical Exam    Last Recorded Vitals  Blood pressure 134/89, pulse 76, temperature 35.1 °C (95.2 °F), resp. rate 16, SpO2 100 %.  Intake/Output last 3 Shifts:  No intake/output data recorded.    Relevant Results                             Assessment/Plan   Active Problems:  There are no active Hospital Problems.               Jovita Everett RN

## 2024-01-15 NOTE — H&P
History Of Present Illness  Ac Torres is a 68 y.o. female presenting with left gluteal pain correlate with SI joint disease there are no changes in house medical management or allergies since her last visit on 10/6/2023.     Past Medical History  Past Medical History:   Diagnosis Date    Personal history of other endocrine, nutritional and metabolic disease 07/01/2022    History of hyperlipidemia    Personal history of other medical treatment 01/19/2021    History of screening mammography       Surgical History  Past Surgical History:   Procedure Laterality Date    KNEE Right     OTHER SURGICAL HISTORY  03/08/2021    Laparoscopy    ROTATOR CUFF REPAIR Bilateral     ULNAR NERVE REPAIR          Social History  She reports that she has never smoked. She has never used smokeless tobacco. She reports that she does not currently use alcohol. She reports that she does not use drugs.    Family History  Family History   Problem Relation Name Age of Onset    Heart disease Mother      Hypertension Mother      COPD Mother      Kidney cancer Father      Hypertension Father      Heart attack Father      Lung cancer Sister      Diabetes Sister      Hypertension Sister      Heart disease Maternal Grandmother      Hypertension Maternal Grandmother          Allergies  Amoxicillin-pot clavulanate, Atorvastatin, Latex, Rosuvastatin, Topiramate, Meloxicam, Nortriptyline, and Pseudoephedrine hcl    Review of Systems   HENT: Negative.     Eyes: Negative.    Respiratory: Negative.     Cardiovascular: Negative.    Gastrointestinal: Negative.    Endocrine: Negative.    Genitourinary: Negative.    Musculoskeletal:  Positive for back pain.   Skin: Negative.    Neurological: Negative.    Hematological: Negative.    Psychiatric/Behavioral: Negative.          Physical Exam  Vitals and nursing note reviewed.   Constitutional:       Appearance: Normal appearance.   HENT:      Head: Normocephalic and atraumatic.      Nose: Nose normal.   Eyes:       Extraocular Movements: Extraocular movements intact.      Conjunctiva/sclera: Conjunctivae normal.      Pupils: Pupils are equal, round, and reactive to light.   Cardiovascular:      Rate and Rhythm: Normal rate and regular rhythm.      Pulses: Normal pulses.      Heart sounds: Normal heart sounds.   Pulmonary:      Effort: Pulmonary effort is normal.      Breath sounds: Normal breath sounds.   Abdominal:      General: Abdomen is flat. Bowel sounds are normal.      Palpations: Abdomen is soft.   Musculoskeletal:         General: Tenderness present.   Skin:     General: Skin is warm.   Neurological:      General: No focal deficit present.      Mental Status: She is alert and oriented to person, place, and time.   Psychiatric:         Mood and Affect: Mood normal.         Behavior: Behavior normal.          Last Recorded Vitals  Blood pressure 134/89, pulse 76, temperature 35.1 °C (95.2 °F), resp. rate 16, SpO2 100 %.    Relevant Results             Assessment/Plan   Active Problems:  There are no active Hospital Problems.      The patient agreed to her SI joint injection today and signed the consent and we proceeded with the procedure             Stacey Flaherty MD

## 2024-01-15 NOTE — Clinical Note
Prepped with ChloraPrep, a minimum of 3 minute dry time, longer if needed, no pooling noted, patient draped in sterile fashion. Left si joint

## 2024-01-15 NOTE — OP NOTE
Patient has no changes in health medical management or allergies since last visit on 10/6/2023    Pre-op diagnosis: Sacroiliitis  Postop diagnosis: The same    PROCEDURE: Left sacroiliac joint Injection     Estimated blood loss: None  Complications: None        INDICATIONS: 68 y.o. female is a pleasant person with a significant history of lower back pain with radiation to lower to gluteal area.  Physical exam and radiological findings correlate with the pre-operative diagnoses.  Because of these findings, we have elected to proceed with the above injection.    PROCEDURE: After sufficient consent was signed, the patient was brought to the Operating Room and was placed in the prone position with a pillow underneath the hips. The back was prepped and draped in the usual sterile fashion.     Under fluoroscopic guidance the left SI joint was identified. The skin at the entry site was infiltrated with 0.5% Lidocaine  using a size #25-gauge needle. A size Spinal needle 22-gauge, 3.5 inch was introduced gradually until the joint was encountered. The needle was advanced into the SI joint. Next, 1 cc of Omnipaque 300 was injected and showed excellent distribution of the dye into the joint.  At that time, Kenalog 40 mg plus 3cc of  0.75% Bupivacaine were injected. The needle was flushed and removed.        The patient tolerated the procedure very well and was transferred to the Recovery Room in stable condition.  She had stable resolution of symptoms.  She is to follow-up in the Pain Management Clinic as scheduled.

## 2024-01-16 ENCOUNTER — APPOINTMENT (OUTPATIENT)
Dept: PRIMARY CARE | Facility: CLINIC | Age: 69
End: 2024-01-16
Payer: COMMERCIAL

## 2024-01-24 ENCOUNTER — PATIENT MESSAGE (OUTPATIENT)
Dept: PRIMARY CARE | Facility: CLINIC | Age: 69
End: 2024-01-24
Payer: COMMERCIAL

## 2024-01-24 DIAGNOSIS — F41.1 GENERALIZED ANXIETY DISORDER: ICD-10-CM

## 2024-01-25 RX ORDER — LORAZEPAM 1 MG/1
1 TABLET ORAL DAILY PRN
Qty: 30 TABLET | Refills: 0 | Status: SHIPPED | OUTPATIENT
Start: 2024-01-25 | End: 2024-07-23

## 2024-01-25 NOTE — TELEPHONE ENCOUNTER
From: Ac Torres  To: Heriberto Keller MD  Sent: 1/24/2024 6:52 PM EST  Subject: Rx refill    Lorazepam 1mg

## 2024-02-07 ENCOUNTER — OFFICE VISIT (OUTPATIENT)
Dept: PRIMARY CARE | Facility: CLINIC | Age: 69
End: 2024-02-07
Payer: COMMERCIAL

## 2024-02-07 VITALS
OXYGEN SATURATION: 95 % | HEART RATE: 81 BPM | WEIGHT: 181 LBS | DIASTOLIC BLOOD PRESSURE: 68 MMHG | RESPIRATION RATE: 18 BRPM | SYSTOLIC BLOOD PRESSURE: 122 MMHG | TEMPERATURE: 97.2 F | BODY MASS INDEX: 30.12 KG/M2

## 2024-02-07 DIAGNOSIS — N18.31 STAGE 3A CHRONIC KIDNEY DISEASE (MULTI): ICD-10-CM

## 2024-02-07 DIAGNOSIS — M54.40 CHRONIC LOW BACK PAIN WITH SCIATICA, SCIATICA LATERALITY UNSPECIFIED, UNSPECIFIED BACK PAIN LATERALITY: Primary | ICD-10-CM

## 2024-02-07 DIAGNOSIS — G89.29 CHRONIC LOW BACK PAIN WITH SCIATICA, SCIATICA LATERALITY UNSPECIFIED, UNSPECIFIED BACK PAIN LATERALITY: Primary | ICD-10-CM

## 2024-02-07 DIAGNOSIS — F41.1 GENERALIZED ANXIETY DISORDER: ICD-10-CM

## 2024-02-07 DIAGNOSIS — I10 PRIMARY HYPERTENSION: ICD-10-CM

## 2024-02-07 DIAGNOSIS — E78.5 HYPERLIPIDEMIA, UNSPECIFIED: ICD-10-CM

## 2024-02-07 DIAGNOSIS — R73.03 PREDIABETES: ICD-10-CM

## 2024-02-07 PROBLEM — R42 DIZZINESS: Status: ACTIVE | Noted: 2023-07-10

## 2024-02-07 PROBLEM — M79.18 PAIN IN BUTTOCK: Status: ACTIVE | Noted: 2023-10-04

## 2024-02-07 PROBLEM — J30.1 ALLERGIC RHINITIS DUE TO POLLEN: Status: ACTIVE | Noted: 2023-05-15

## 2024-02-07 PROBLEM — M75.120 COMPLETE TEAR OF ROTATOR CUFF: Status: ACTIVE | Noted: 2021-05-06

## 2024-02-07 PROBLEM — Z98.890 OTHER SPECIFIED POSTPROCEDURAL STATES: Status: ACTIVE | Noted: 2022-09-15

## 2024-02-07 PROBLEM — M79.18 MYALGIA, OTHER SITE: Status: ACTIVE | Noted: 2023-09-12

## 2024-02-07 PROBLEM — J45.901 ASTHMA FLARE (HHS-HCC): Status: RESOLVED | Noted: 2023-05-15 | Resolved: 2024-02-07

## 2024-02-07 PROBLEM — J45.901 EXACERBATION OF ASTHMA (HHS-HCC): Status: RESOLVED | Noted: 2023-01-23 | Resolved: 2024-02-07

## 2024-02-07 PROBLEM — J30.9 ALLERGIC RHINITIS, UNSPECIFIED: Status: ACTIVE | Noted: 2023-01-23

## 2024-02-07 PROBLEM — Z78.0 ASYMPTOMATIC MENOPAUSAL STATE: Status: ACTIVE | Noted: 2023-03-28

## 2024-02-07 PROBLEM — R53.83 TIRED: Status: ACTIVE | Noted: 2023-07-10

## 2024-02-07 PROBLEM — R49.0 DYSPHONIA: Status: ACTIVE | Noted: 2023-02-02

## 2024-02-07 PROBLEM — J45.901 EXACERBATION OF ASTHMA (HHS-HCC): Status: ACTIVE | Noted: 2023-01-23

## 2024-02-07 PROBLEM — M25.561 PAIN IN RIGHT KNEE: Status: ACTIVE | Noted: 2023-05-15

## 2024-02-07 PROBLEM — M25.569 KNEE PAIN: Status: ACTIVE | Noted: 2022-12-09

## 2024-02-07 PROBLEM — S46.919A STRAIN OF SHOULDER: Status: ACTIVE | Noted: 2021-05-06

## 2024-02-07 PROBLEM — R55 SYNCOPE AND COLLAPSE: Status: ACTIVE | Noted: 2023-07-09

## 2024-02-07 PROBLEM — R06.09 OTHER FORMS OF DYSPNEA: Status: ACTIVE | Noted: 2023-01-23

## 2024-02-07 PROBLEM — Z79.899 OTHER LONG TERM (CURRENT) DRUG THERAPY: Status: ACTIVE | Noted: 2023-02-02

## 2024-02-07 PROBLEM — N89.8 PRURITUS OF VAGINA: Status: ACTIVE | Noted: 2024-02-07

## 2024-02-07 PROBLEM — M85.852 OTHER SPECIFIED DISORDERS OF BONE DENSITY AND STRUCTURE, LEFT THIGH: Status: ACTIVE | Noted: 2023-03-28

## 2024-02-07 PROBLEM — Z86.39 HISTORY OF ELEVATED LIPIDS: Status: ACTIVE | Noted: 2024-02-07

## 2024-02-07 PROBLEM — Z79.82 LONG TERM (CURRENT) USE OF ASPIRIN: Status: ACTIVE | Noted: 2023-01-23

## 2024-02-07 PROBLEM — G89.28 OTHER CHRONIC POSTPROCEDURAL PAIN: Status: ACTIVE | Noted: 2023-01-23

## 2024-02-07 PROBLEM — M99.9 BIOMECHANICAL LESION: Status: ACTIVE | Noted: 2023-01-23

## 2024-02-07 PROBLEM — K21.9 GASTROESOPHAGEAL REFLUX DISEASE WITHOUT ESOPHAGITIS: Status: ACTIVE | Noted: 2023-01-23

## 2024-02-07 PROBLEM — E66.9 OBESITY, UNSPECIFIED: Status: ACTIVE | Noted: 2023-01-23

## 2024-02-07 PROBLEM — J40 BRONCHITIS: Status: ACTIVE | Noted: 2024-02-07

## 2024-02-07 PROBLEM — R06.09 DYSPNEA ON EXERTION: Status: ACTIVE | Noted: 2023-06-07

## 2024-02-07 PROCEDURE — 1159F MED LIST DOCD IN RCRD: CPT | Performed by: INTERNAL MEDICINE

## 2024-02-07 PROCEDURE — 1125F AMNT PAIN NOTED PAIN PRSNT: CPT | Performed by: INTERNAL MEDICINE

## 2024-02-07 PROCEDURE — 3078F DIAST BP <80 MM HG: CPT | Performed by: INTERNAL MEDICINE

## 2024-02-07 PROCEDURE — 99214 OFFICE O/P EST MOD 30 MIN: CPT | Performed by: INTERNAL MEDICINE

## 2024-02-07 PROCEDURE — 3074F SYST BP LT 130 MM HG: CPT | Performed by: INTERNAL MEDICINE

## 2024-02-07 PROCEDURE — 1036F TOBACCO NON-USER: CPT | Performed by: INTERNAL MEDICINE

## 2024-02-07 RX ORDER — METHOCARBAMOL 500 MG/1
TABLET, FILM COATED ORAL
COMMUNITY
Start: 2018-07-24 | End: 2024-02-07 | Stop reason: ALTCHOICE

## 2024-02-07 RX ORDER — PRAVASTATIN SODIUM 40 MG/1
40 TABLET ORAL NIGHTLY
Qty: 90 TABLET | Refills: 3 | Status: SHIPPED | OUTPATIENT
Start: 2024-02-07 | End: 2025-02-06

## 2024-02-07 RX ORDER — ESCITALOPRAM OXALATE 10 MG/1
TABLET ORAL
COMMUNITY
Start: 2019-05-14 | End: 2024-02-07 | Stop reason: ALTCHOICE

## 2024-02-07 ASSESSMENT — ANXIETY QUESTIONNAIRES
1. FEELING NERVOUS, ANXIOUS, OR ON EDGE: SEVERAL DAYS
6. BECOMING EASILY ANNOYED OR IRRITABLE: SEVERAL DAYS
7. FEELING AFRAID AS IF SOMETHING AWFUL MIGHT HAPPEN: NOT AT ALL
3. WORRYING TOO MUCH ABOUT DIFFERENT THINGS: SEVERAL DAYS
4. TROUBLE RELAXING: SEVERAL DAYS
2. NOT BEING ABLE TO STOP OR CONTROL WORRYING: NOT AT ALL

## 2024-02-07 ASSESSMENT — PATIENT HEALTH QUESTIONNAIRE - PHQ9
SUM OF ALL RESPONSES TO PHQ9 QUESTIONS 1 AND 2: 0
2. FEELING DOWN, DEPRESSED OR HOPELESS: NOT AT ALL
1. LITTLE INTEREST OR PLEASURE IN DOING THINGS: NOT AT ALL

## 2024-02-07 NOTE — PROGRESS NOTES
OARRS:  Heriberto Keller MD on 2/7/2024 10:13 AM  I have personally reviewed the OARRS report for Ac Torres. I have considered the risks of abuse, dependence, addiction and diversion    Is the patient prescribed a combination of a benzodiazepine and opioid?  Yes, I feel it is clincially indicated to continue the medication and have discussed with the patient risks/benefits/alternatives.    Last Urine Drug Screen / ordered today: Yes  Recent Results (from the past 8760 hour(s))   OPIATE/OPIOID/BENZO PRESCRIPTION COMPLIANCE    Collection Time: 07/10/23  3:24 PM   Result Value Ref Range    DRUG SCREEN COMMENT URINE SEE BELOW     Creatine, Urine 229.7 mg/dL    Amphetamine Screen, Urine PRESUMPTIVE NEGATIVE NEGATIVE    Barbiturate Screen, Urine PRESUMPTIVE NEGATIVE NEGATIVE    Cannabinoid Screen, Urine PRESUMPTIVE NEGATIVE NEGATIVE    Cocaine Screen, Urine PRESUMPTIVE NEGATIVE NEGATIVE    PCP Screen, Urine PRESUMPTIVE NEGATIVE NEGATIVE    7-Aminoclonazepam <25 Cutoff <25 ng/mL    Alpha-Hydroxyalprazolam <25 Cutoff <25 ng/mL    Alpha-Hydroxymidazolam <25 Cutoff <25 ng/mL    Alprazolam <25 Cutoff <25 ng/mL    Chlordiazepoxide <25 Cutoff <25 ng/mL    Clonazepam <25 Cutoff <25 ng/mL    Diazepam <25 Cutoff <25 ng/mL    Lorazepam 222 (A) Cutoff <25 ng/mL    Midazolam <25 Cutoff <25 ng/mL    Nordiazepam <25 Cutoff <25 ng/mL    Oxazepam <25 Cutoff <25 ng/mL    Temazepam <25 Cutoff <25 ng/mL    Zolpidem <25 Cutoff <25 ng/mL    Zolpidem Metabolite (ZCA) <25 Cutoff <25 ng/mL    6-Acetylmorphine <25 Cutoff <25 ng/mL    Codeine <50 Cutoff <50 ng/mL    Hydrocodone <25 Cutoff <25 ng/mL    Hydromorphone <25 Cutoff <25 ng/mL    Morphine Urine <50 Cutoff <50 ng/mL    Norhydrocodone <25 Cutoff <25 ng/mL    Noroxycodone 59 (A) Cutoff <25 ng/mL    Oxycodone <25 Cutoff <25 ng/mL    Oxymorphone 40 (A) Cutoff <25 ng/mL    Tramadol <50 Cutoff <50 ng/mL    O-Desmethyltramadol <50 Cutoff <50 ng/mL    Fentanyl <2.5 Cutoff<2.5 ng/mL     Norfentanyl <2.5 Cutoff<2.5 ng/mL    METHADONE CONFIRMATION,URINE <25 Cutoff <25 ng/mL    EDDP <25 Cutoff <25 ng/mL     Results are as expected.         Controlled Substance Agreement:  Date of the Last Agreement: 2023  Reviewed Controlled Substance Agreement including but not limited to the benefits, risks, and alternatives to treatment with a Controlled Substance medication(s).    Opioids:  What is the patient's goal of therapy? Chronic back pain  Is this being achieved with current treatment? Right now, better since epidural injection    I have calculated the patient's Morphine Dose Equivalent (MED):   I have considered referral to Pain Management and/or a specialist, and do not feel it is necessary at this time.    I feel that it is clinically indicated to continue this current medication regimen after consideration of alternative therapies, and other non-opioid treatment.    Opioid Risk Screening:  Family History of Substance Abuse  Alcohol: 0 (10/6/2023  8:00 AM)  Illegal Dru (10/6/2023  8:00 AM)  Prescription Dru (10/6/2023  8:00 AM)    Personal History of Substance Abuse  Alcohol: 0 (10/6/2023  8:00 AM)  Illegal Drugs: 0 (10/6/2023  8:00 AM)  Prescription Drugs: 0 (10/6/2023  8:00 AM)    Patient Age (16-45)  Age (16-45): 0 (10/6/2023  8:00 AM)    History of Preadolescent Sexual Abuse  History of Preadolescent Sexual Abuse: 0 (10/6/2023  8:00 AM)    Psychological Disease  Attention Deficit Disorder, Obsessive Compulsive Disorder, Bipolar, Schizophrenia: 0 (10/6/2023  8:00 AM)  Depression: 0 (10/6/2023  8:00 AM)    Total Score  Total Score: 0 (10/6/2023  8:00 AM)    Total Score Risk Category  TOTAL SCORE CATEGORY: Low Risk (0-3) (10/6/2023  8:00 AM)        Pain Assessment:  No data recorded and Benzodiazepines:  What is the patient's goal of therapy? JUAN  Is this being achieved with current treatment? Overall good, right now Great grandson is 3 weeks old and is in NICU.    JUAN-7:  No data  recorded    Activities of Daily Living:   Is your overall impression that this patient is benefiting (symptom reduction outweighs side effects) from benzodiazepine therapy? Yes     1. Physical Functioning: Better  2. Family Relationship: Better  3. Social Relationship: Better  4. Mood: Better  5. Sleep Patterns: Better  6. Overall Function: BetterPCP: Heriberto Keller MD   I have reviewed existing histories, notes, past medical history, surgical history, social history, family history, med list, allergy list, and immunization, and updated.    HPI:   Routine FU.  Under stress right now because great grandson who is 3 weeks old, in NICU right now. She goes to the hospital with her grand daughter there every day.  Back pain is better since epidural injection 3 weeks ago      Lab Results   Component Value Date    WBC 6.5 07/10/2023    HGB 13.4 07/10/2023    HCT 43.4 07/10/2023     07/10/2023    CHOL 214 (H) 11/02/2023    TRIG 148 11/02/2023    HDL 43.6 11/02/2023    ALT 16 07/10/2023    AST 17 07/10/2023     07/10/2023    K 4.7 07/10/2023     07/10/2023    CREATININE 1.06 (H) 07/10/2023    BUN 15 07/10/2023    CO2 29 07/10/2023    TSH 2.22 07/10/2023    HGBA1C 5.7 (H) 11/02/2023     Physical exam:  /68   Pulse 81   Temp 36.2 °C (97.2 °F)   Resp 18   Wt 82.1 kg (181 lb)   SpO2 95%   BMI 30.12 kg/m²        Assessments/orders:   1. Chronic low back pain with sciatica, sciatica laterality unspecified, unspecified back pain laterality        2. Prediabetes        3. Primary hypertension        4. Generalized anxiety disorder        5. Statin intolerance        6. Stage 3a chronic kidney disease (CMS/HCC)

## 2024-02-12 ENCOUNTER — PATIENT MESSAGE (OUTPATIENT)
Dept: ALLERGY | Facility: CLINIC | Age: 69
End: 2024-02-12

## 2024-02-12 ENCOUNTER — OFFICE VISIT (OUTPATIENT)
Dept: ALLERGY | Facility: CLINIC | Age: 69
End: 2024-02-12
Payer: COMMERCIAL

## 2024-02-12 VITALS
WEIGHT: 183.4 LBS | OXYGEN SATURATION: 94 % | HEART RATE: 77 BPM | DIASTOLIC BLOOD PRESSURE: 83 MMHG | SYSTOLIC BLOOD PRESSURE: 137 MMHG | HEIGHT: 65 IN | BODY MASS INDEX: 30.56 KG/M2 | TEMPERATURE: 97 F

## 2024-02-12 DIAGNOSIS — J30.1 SEASONAL ALLERGIC RHINITIS DUE TO POLLEN: ICD-10-CM

## 2024-02-12 DIAGNOSIS — L50.8 CHRONIC URTICARIA: Primary | ICD-10-CM

## 2024-02-12 DIAGNOSIS — J30.1 SEASONAL ALLERGIC RHINITIS DUE TO POLLEN: Primary | ICD-10-CM

## 2024-02-12 PROCEDURE — 3079F DIAST BP 80-89 MM HG: CPT | Performed by: ALLERGY & IMMUNOLOGY

## 2024-02-12 PROCEDURE — 99213 OFFICE O/P EST LOW 20 MIN: CPT | Performed by: ALLERGY & IMMUNOLOGY

## 2024-02-12 PROCEDURE — 1036F TOBACCO NON-USER: CPT | Performed by: ALLERGY & IMMUNOLOGY

## 2024-02-12 PROCEDURE — 3075F SYST BP GE 130 - 139MM HG: CPT | Performed by: ALLERGY & IMMUNOLOGY

## 2024-02-12 PROCEDURE — 1159F MED LIST DOCD IN RCRD: CPT | Performed by: ALLERGY & IMMUNOLOGY

## 2024-02-12 PROCEDURE — 1125F AMNT PAIN NOTED PAIN PRSNT: CPT | Performed by: ALLERGY & IMMUNOLOGY

## 2024-02-12 RX ORDER — TRIAMCINOLONE ACETONIDE 55 UG/1
2 SPRAY, METERED NASAL DAILY
Qty: 16.5 G | Refills: 11 | Status: SHIPPED | OUTPATIENT
Start: 2024-02-12 | End: 2025-02-11

## 2024-02-12 RX ORDER — LEVOCETIRIZINE DIHYDROCHLORIDE 5 MG/1
5 TABLET, FILM COATED ORAL 2 TIMES DAILY
Qty: 60 TABLET | Refills: 11 | Status: SHIPPED | OUTPATIENT
Start: 2024-02-12 | End: 2024-05-28 | Stop reason: SDUPTHER

## 2024-02-12 RX ORDER — MOMETASONE FUROATE 50 UG/1
2 SPRAY, METERED NASAL 2 TIMES DAILY
Qty: 17 G | Refills: 11 | Status: SHIPPED | OUTPATIENT
Start: 2024-02-12 | End: 2024-02-28 | Stop reason: ALTCHOICE

## 2024-02-12 ASSESSMENT — ENCOUNTER SYMPTOMS
CONSTITUTIONAL NEGATIVE: 1
RESPIRATORY NEGATIVE: 1
HEMATOLOGIC/LYMPHATIC NEGATIVE: 1
BACK PAIN: 1
ALLERGIC/IMMUNOLOGIC NEGATIVE: 1
CARDIOVASCULAR NEGATIVE: 1
EYES NEGATIVE: 1
GASTROINTESTINAL NEGATIVE: 1

## 2024-02-12 ASSESSMENT — PAIN SCALES - GENERAL: PAINLEVEL: 8

## 2024-02-12 NOTE — PROGRESS NOTES
"Ac Torres presents for follow up evaluation today.      She provide the following history:    She reports that she has had more stress lately.  Her great grandson is in the NICU, and is improving.  She has had continued back pain and received a steroid injection on January 15th, however still is in pain.  She uses occasional pain medications but tries to avoid them.  She is using levocetririzine 5 mg daily.  She notes that she increases to levocetirizine 5 mg twice daily about once a monthly for exacerbation of symptoms.  Overall urticaria is well-controlled on current regimen.  Regarding rhinitis, she has had some nasal congestion.  She reports nosebleeds with Flonase despite trying to aim away from nasal septum.     Review of Systems   Constitutional: Negative.    HENT:  Positive for congestion.    Eyes: Negative.    Respiratory: Negative.     Cardiovascular: Negative.    Gastrointestinal: Negative.    Musculoskeletal:  Positive for back pain.   Skin: Negative.    Allergic/Immunologic: Negative.    Hematological: Negative.        Vital signs:  /83   Pulse 77   Temp 36.1 °C (97 °F)   Ht 1.651 m (5' 5\")   Wt 83.2 kg (183 lb 6.4 oz)   SpO2 94%   BMI 30.52 kg/m²       GENERAL: Alert, oriented and in no acute distress.     HEENT: EYES: No conjunctival injection or cobblestoning. Nose: nasal turbinates mildly edematous and are not boggy.  There is no mucous stranding, polyps, or blood    noted. EARS: Tympanic membranes are clear. MOUTH: moist and pink with no exudates, ulcers, or thrush. NECK: is supple, without adenopathy.  No upper airway stridor noted.       HEART: regular rate and rhythm.       LUNGS: Clear to auscultation bilaterally. No wheezing, rhonchi or rales.        ABDOMEN: Positive bowel sounds, soft, nontender, nondistended.       EXTREMITIES: No clubbing or edema.        NEURO:  Normal affect.  Gait normal.      SKIN: No rash, hives, or angioedema noted    Impression:   1. Chronic " urticaria    2. Seasonal allergic rhinitis due to pollen       Assessment and plan:  Chronic Urticaria: Doing well on current therapy. Continue levocetirizine 5 mg once to twice daily. Continue to avoid NSAIDs and limit use of narcotic pain medications which can trigger hives. She is up-to-date with all age-appropriate cancer screening. Total IgE, serum tryptase, CU index, TSH, antithyroid antibodies were within normal limits.      Allergic rhinitis, seasonal: Environmental allergens specific IgE panel positive for tree, grass, weed, mold. Had nosebleeds with Flonase.  Start Nasonex 2 sprays each nostril once daily. May use antihistamines as above for rhinitis symptoms.     Plan for follow up in 6 months or sooner if needed

## 2024-02-12 NOTE — PATIENT INSTRUCTIONS
Continue levocetirizine (Xyzal) 5 mg once to twice daily    You may use Nasonex (mometasone) 2 sprays each nostril once daily    Technique for nasal spray administration:  First, look slightly down, breathe normally, you do not need to sniff while you spray.  To use the nose spray, place the tip in your nose with the opposite hand (left hand, right side of nose, right hand, left side of nose), and aim or point toward the outside of the nose.  Do not sniff or snort medication afterwards as this can cause most of the medication to be swallowed.  Rather, dab your nose with a tissue if any runs out.    Our nurse line phone number is [phone]    We would like to see you in follow up in 6 months or sooner if needed

## 2024-02-15 ENCOUNTER — OFFICE VISIT (OUTPATIENT)
Dept: CARDIOLOGY | Facility: CLINIC | Age: 69
End: 2024-02-15
Payer: COMMERCIAL

## 2024-02-15 VITALS
OXYGEN SATURATION: 98 % | DIASTOLIC BLOOD PRESSURE: 70 MMHG | SYSTOLIC BLOOD PRESSURE: 118 MMHG | HEIGHT: 65 IN | HEART RATE: 97 BPM | WEIGHT: 185 LBS | BODY MASS INDEX: 30.82 KG/M2

## 2024-02-15 DIAGNOSIS — R00.2 PALPITATIONS: Chronic | ICD-10-CM

## 2024-02-15 DIAGNOSIS — R94.31 ABNORMAL EKG: ICD-10-CM

## 2024-02-15 DIAGNOSIS — Z86.79 S/P RF ABLATION OPERATION FOR ARRHYTHMIA: ICD-10-CM

## 2024-02-15 DIAGNOSIS — I47.10 PAROXYSMAL SVT (SUPRAVENTRICULAR TACHYCARDIA) (CMS-HCC): ICD-10-CM

## 2024-02-15 DIAGNOSIS — I10 PRIMARY HYPERTENSION: ICD-10-CM

## 2024-02-15 DIAGNOSIS — E78.00 PURE HYPERCHOLESTEROLEMIA: ICD-10-CM

## 2024-02-15 DIAGNOSIS — R07.89 ATYPICAL CHEST PAIN: Primary | ICD-10-CM

## 2024-02-15 DIAGNOSIS — Z98.890 HISTORY OF RADIOFREQUENCY ABLATION (RFA) PROCEDURE FOR CARDIAC ARRHYTHMIA: ICD-10-CM

## 2024-02-15 DIAGNOSIS — R94.39 ABNORMAL STRESS TEST: ICD-10-CM

## 2024-02-15 DIAGNOSIS — Z98.890 S/P RF ABLATION OPERATION FOR ARRHYTHMIA: ICD-10-CM

## 2024-02-15 PROCEDURE — 1036F TOBACCO NON-USER: CPT | Performed by: STUDENT IN AN ORGANIZED HEALTH CARE EDUCATION/TRAINING PROGRAM

## 2024-02-15 PROCEDURE — 3074F SYST BP LT 130 MM HG: CPT | Performed by: STUDENT IN AN ORGANIZED HEALTH CARE EDUCATION/TRAINING PROGRAM

## 2024-02-15 PROCEDURE — 1126F AMNT PAIN NOTED NONE PRSNT: CPT | Performed by: STUDENT IN AN ORGANIZED HEALTH CARE EDUCATION/TRAINING PROGRAM

## 2024-02-15 PROCEDURE — 99215 OFFICE O/P EST HI 40 MIN: CPT | Performed by: STUDENT IN AN ORGANIZED HEALTH CARE EDUCATION/TRAINING PROGRAM

## 2024-02-15 PROCEDURE — 3078F DIAST BP <80 MM HG: CPT | Performed by: STUDENT IN AN ORGANIZED HEALTH CARE EDUCATION/TRAINING PROGRAM

## 2024-02-15 PROCEDURE — 1159F MED LIST DOCD IN RCRD: CPT | Performed by: STUDENT IN AN ORGANIZED HEALTH CARE EDUCATION/TRAINING PROGRAM

## 2024-02-15 RX ORDER — HYDROCHLOROTHIAZIDE 25 MG/1
25 TABLET ORAL DAILY
Qty: 90 TABLET | Refills: 3 | Status: SHIPPED | OUTPATIENT
Start: 2024-02-15 | End: 2025-02-14

## 2024-02-15 RX ORDER — NITROGLYCERIN 0.4 MG/1
0.4 TABLET SUBLINGUAL EVERY 5 MIN PRN
Qty: 100 TABLET | Refills: 11 | Status: SHIPPED | OUTPATIENT
Start: 2024-02-15 | End: 2025-02-14

## 2024-02-15 RX ORDER — METOPROLOL SUCCINATE 25 MG/1
25 TABLET, EXTENDED RELEASE ORAL DAILY
Qty: 90 TABLET | Refills: 3 | Status: SHIPPED | OUTPATIENT
Start: 2024-02-15

## 2024-02-15 ASSESSMENT — PAIN SCALES - GENERAL: PAINLEVEL: 0-NO PAIN

## 2024-02-15 ASSESSMENT — ENCOUNTER SYMPTOMS
OCCASIONAL FEELINGS OF UNSTEADINESS: 0
DEPRESSION: 0
LOSS OF SENSATION IN FEET: 0

## 2024-02-15 NOTE — PATIENT INSTRUCTIONS
I will send a prescription for sublingual or under the tongue nitroglycerin to be taken as needed for chest discomfort.    Please continue current cardiac medications including hydrochlorothiazide 25 mg daily, Toprol succinate 25 mg daily, pravastatin 40 mg daily.  Refills were sent to your pharmacy.    Please followup with me in Cardiology clinic within the next 8 to 9 months.  Please return to clinic sooner or seek emergent care if your symptoms reoccur or worsen.

## 2024-02-15 NOTE — PROGRESS NOTES
Follow-up     HPI:    Ac Torres is a 68 y.o. female with pertinent history of asthma, family history of premature coronary artery disease, bronchitis, lumbar disc disease, hypertension, hyperlipidemia, palpitations, history of paroxysmal supraventricular tachycardia status post ablation [facility], preserved ejection fraction with impaired relaxation echo performed 1/9/2023, concern for mild anterolateral ischemia nuclear stress test performed 1/10/2023, mild nonobstructive coronary artery disease with normal LVEDP of 10 mmHg on cardiac catheterization performed 1/23/2023, a single 4 beat episode of SVT on event monitor performed December 2022 presents to cardiology clinic for follow-up.     She is doing relatively well.  She does note a single episode of chest discomfort lasting about 2 minutes back in November that occurred at rest and resolved spontaneously.  She did not seek emergency medical care.  We did review her prior cardiac catheterization and echocardiogram.  No similar chest pain episodes since then.  Dyspnea on exertion is stable.  No exacerbating or relieving factors.   Pt denies orthopnea, and paroxysmal nocturnal dyspnea.  Pt denies worsening lower extremity edema.  Pt denies palpitations or syncope.  No recent falls.  No fever or chills.  No cough.  No change in bowel or bladder habits.  No sick contacts.  No recent travel.    12 point review of systems including (Constitutional, Eyes, ENMT, Respiratory, Cardiac, Gastrointestinal, Neurological, Psychiatric, and Hematologic) was performed and is otherwise negative.    Past medical history reviewed:   has a past medical history of Personal history of other endocrine, nutritional and metabolic disease (07/01/2022) and Personal history of other medical treatment (01/19/2021).    Past surgical history reviewed:   has a past surgical history that includes Other surgical history (03/08/2021); Rotator cuff repair (Bilateral); Ulnar nerve  repair; and XR knee (Right).    Social history reviewed:   reports that she has never smoked. She has never used smokeless tobacco. She reports that she does not currently use alcohol. She reports that she does not use drugs.     Family history reviewed:    Family History   Problem Relation Name Age of Onset    Heart disease Mother      Hypertension Mother      COPD Mother      Kidney cancer Father      Hypertension Father      Heart attack Father      Lung cancer Sister      Diabetes Sister      Hypertension Sister      Heart disease Maternal Grandmother      Hypertension Maternal Grandmother         Allergies reviewed: Amoxicillin-pot clavulanate, Atorvastatin, Latex, Rosuvastatin, Topiramate, Meloxicam, Nortriptyline, and Pseudoephedrine hcl     Medications reviewed:   Current Outpatient Medications   Medication Instructions    albuterol 90 mcg/actuation inhaler 1-2 puffs, inhalation, Every 4-6 hours as needed    B.animalis,bifid,infantis,long (PROBIOTIC 4X ORAL) oral, Daily RT    biotin 5 mg capsule oral    calcium acetate 668 mg (169 mg calcium) tablet oral, Daily    cholecalciferol (VITAMIN D-3) 1,000 Units, oral, Daily    clindamycin (Cleocin T) 1 % lotion     clobetasol (Temovate) 0.05 % cream Topical, 2 times daily    desonide (DesOwen) 0.05 % ointment     fluticasone (Flonase) 50 mcg/actuation nasal spray 2 sprays, Each Nostril, Daily    hydroCHLOROthiazide (HYDRODIURIL) 25 mg, oral, Daily    levocetirizine (XYZAL) 5 mg, oral, 2 times daily    levocetirizine (XYZAL) 5 mg, oral, 2 times daily    LORazepam (Ativan) 2 mg tablet Take 1 p.o. 1 hour before the procedure    LORazepam (ATIVAN) 1 mg, oral, Daily PRN    metoprolol succinate XL (TOPROL-XL) 25 mg, oral, Daily    mometasone (Nasonex) 50 mcg/actuation nasal spray 2 sprays, Each Nostril, 2 times daily    naloxone (NARCAN) 4 mg, nasal, As needed, In one nostril and call 911     omeprazole (PRILOSEC) 20 mg, oral, Daily    oxyCODONE-acetaminophen (Percocet)  5-325 mg tablet 1 tablet, oral, Every 8 hours PRN    pravastatin (PRAVACHOL) 40 mg, oral, Nightly    triamcinolone (Kenalog) 0.1 % ointment APPLY TO ITCHY HIVES ON BODY, MONDAY THRU FRIDAY. AVOID FACE.<BR>    triamcinolone (Nasacort) 55 mcg nasal inhaler 2 sprays, Each Nostril, Daily        Vitals reviewed: Visit Vitals  /70 (Patient Position: Sitting)   Pulse 97       Physical Exam:   General:  Patient is awake, alert, and oriented.  Patient is in no acute distress.  HEENT:  Pupils equal and reactive.  Normocephalic.  Moist mucosa.    Neck:  No thyromegaly.  Normal Jugular Venous Pressure.  Cardiovascular:  Regular rate and rhythm.  Normal S1 and S2.  1/6 KHADRA.  Pulmonary:  Clear to auscultation bilaterally.  Abdomen:  Soft. Non-tender.   Non-distended.  Positive bowel sounds.  Lower Extremities:  2+ pedal pulses. No LE edema.  Neurologic:  Cranial nerves intact.  No focal deficit.   Skin: Skin warm and dry, normal skin turgor.   Psychiatric: Normal affect.    Last Labs:  CBC -      Lab Results   Component Value Date    WBC 6.5 07/10/2023    HGB 13.4 07/10/2023    HCT 43.4 07/10/2023     07/10/2023        CMP-  Lab Results   Component Value Date    GLUCOSE 96 07/10/2023     07/10/2023    K 4.7 07/10/2023     07/10/2023    CO2 29 07/10/2023    ANIONGAP 12 07/10/2023    BUN 15 07/10/2023    CREATININE 1.06 (H) 07/10/2023    CALCIUM 9.7 07/10/2023    PROT 7.3 07/10/2023    ALBUMIN 4.1 07/10/2023    AST 17 07/10/2023    ALT 16 07/10/2023    ALKPHOS 73 07/10/2023    BILITOT 0.4 07/10/2023        LIPIDS-  Lab Results   Component Value Date    CHOL 214 (H) 11/02/2023    TRIG 148 11/02/2023    HDL 43.6 11/02/2023    CHHDL 4.9 11/02/2023    VLDL 30 11/02/2023        OTHERS-  Lab Results   Component Value Date    HGBA1C 5.7 (H) 11/02/2023    BNP 7 06/02/2023        I personally reviewed the patient's recent vitals, labs, medications, orders, EKGs, pertinent cardiac imaging/ echocardiography and  ischemic evaluations including stress testing/ cardiac catheterization.    Assessment and Plan:    Ac Torres is a 68 y.o. female with pertinent history of asthma, family history of premature coronary artery disease, bronchitis, lumbar disc disease, hypertension, hyperlipidemia, palpitations, history of paroxysmal supraventricular tachycardia status post ablation [facility], preserved ejection fraction with impaired relaxation echo performed 1/9/2023, concern for mild anterolateral ischemia nuclear stress test performed 1/10/2023, mild nonobstructive coronary artery disease with normal LVEDP of 10 mmHg on cardiac catheterization performed 1/23/2023, a single 4 beat episode of SVT on event monitor performed December 2022 presents to cardiology clinic for follow-up.   She is doing relatively well.  She does note a single episode of chest discomfort lasting about 2 minutes back in November that occurred at rest and resolved spontaneously.  She did not seek emergency medical care.  We did review her prior cardiac catheterization and echocardiogram.  No similar chest pain episodes since then.  Dyspnea on exertion is stable.      I will send a prescription for sublingual or under the tongue nitroglycerin to be taken as needed for chest discomfort.    Please continue current cardiac medications including hydrochlorothiazide 25 mg daily, Toprol succinate 25 mg daily, pravastatin 40 mg daily.  Refills were sent to your pharmacy.    Please followup with me in Cardiology clinic within the next 8 to 9 months.  Please return to clinic sooner or seek emergent care if your symptoms reoccur or worsen.    Thank you for allowing me to participate in their care.  Please feel free to call me with any further questions or concerns.        Reynaldo Montelongo MD, FACC, JONO SMITH  Division of Cardiovascular Medicine  Medical Director, [facility]  [place]  Clinical , [employer]  [email or web address]   Office:  [phone]

## 2024-02-23 NOTE — ADDENDUM NOTE
Encounter addended by: Stacey Flaherty MD on: 2/23/2024 12:02 PM   Actions taken: Clinical Note Signed, Charge Capture section accepted

## 2024-02-28 ENCOUNTER — PATIENT MESSAGE (OUTPATIENT)
Dept: PRIMARY CARE | Facility: CLINIC | Age: 69
End: 2024-02-28
Payer: COMMERCIAL

## 2024-02-28 DIAGNOSIS — M54.50 CHRONIC BILATERAL LOW BACK PAIN, UNSPECIFIED WHETHER SCIATICA PRESENT: ICD-10-CM

## 2024-02-28 DIAGNOSIS — G89.29 CHRONIC BILATERAL LOW BACK PAIN, UNSPECIFIED WHETHER SCIATICA PRESENT: ICD-10-CM

## 2024-02-28 RX ORDER — OXYCODONE AND ACETAMINOPHEN 5; 325 MG/1; MG/1
1 TABLET ORAL EVERY 8 HOURS PRN
Qty: 30 TABLET | Refills: 0 | Status: SHIPPED | OUTPATIENT
Start: 2024-02-28 | End: 2024-04-10 | Stop reason: SDUPTHER

## 2024-03-11 NOTE — PROGRESS NOTES
SUBJECTIVE:  This is 68 y.o.  female with PMH of obesity BMI of 31, anxiety, hypertension, chronic lower back pain on hydrocodone from PCP who has been treated for Lower back pain and RT sacroiliitis who responds very well to left SI joint injections who is here for follow-up stating that the SI joint did not help her much last time.  I started going in depth with her exam and her radiological findings and I really do feel that her pain is coming from her facet joint on the left side specially with the tenderness and the pain associated with facet loading during the exam today.  Will plan on left L3-5 MBB or radiofrequency ablation what ever the insurance approves under fluoroscopy guidance as diagnostic and therapeutic treatment specially that she had excellent relief in 2022 from her medial branch block.  The patient gets Percocet from her PCP and we may get her an extra Percocet or her PCP give her extra Percocet for her coming up medication in May.  But for now I think left L3-5 MBB under fluoroscopy guidance is a valuable tool as diagnostic  procedure.        Prior office visit:  10/6/2023: This is 68 y.o.  female with PMH of obesity BMI of 31, anxiety, hypertension, chronic lower back pain on hydrocodone from PCP who has been treated for Lower back pain and RT sacroiliitis . Pain is better, The pain is described as achiness and is relieved by Lying down with who is here for follow-up very happy with the result of her SI joint injection with totally improved pain in the right gluteal area.  The patient is in physical therapy and she already bought SI joint belt and that is helping her a lot when she walks.  Patient to continue her physical therapy and self related exercises and she can call the office to repeat the injection when it is needed.     Last procedure:   1/15/2024 left SI joint injection patient has had 0% improvement in pain and function  7/24/2023 left SI joint injection the patient has had 80%  improvement in pain and function  4/24/2023 left SI joint injection has had a 50% relief for 4 to 5 weeks  11/7/2022 left SI injection the patient has had a 100% improvement in pain and function for almost 2 months  9/19/2022 left SI injection the patient has had a 100% improvement in pain and function and the pain just came back  8/8/2022 left L3-5 MBB the patient has had a 70% improvement in pain and function  Caudal and bilateral L5-S1 interlaminar JULIO C by Lydia the patient has had helped with the legs numbness but never affected the lower back pain which is her major problem     Portions of record reviewed for pertinent issues: active problem list, medication list, allergies, family history, social history, notes from last encounter, encounters, lab results, imaging and other system records.           I have personally reviewed the [facility] report for this patient. This report is scanned into the electronic medical record. I have considered the risks of abuse, dependence, addiction and diversion. It showed Percocet 5 mg once daily and lorazepam 1 mg HS from Heriberto Keller MD   OPIOID RISK ASSESSMENT SCORE 0/26  Aberrant behavior: None        Social history: The patient is retired, she has 3 children and 7 grandchildren, finished few college classes denies smoking drinking or use of illicit drugs     Diagnostic studies:  7/19/2022 cervical x-ray showed mild spondylotic changes with mild degeneration in the lower cervical spine     12/8/2021 EMG of the left leg was within normal limits     11/6/2021 MRI of the lumbar spine did not show any bone marrow edema or endplate changes, grade 1 spondylosis at the L4-5 but no canal stenosis no foraminal stenosis but significant facet hypertrophy and spondylosis worse at the L4-5 and L5-S1:  FINDINGS:  Mild levoscoliosis noted. Conus medullaris terminates at the level of  the L1 vertebral body. Visualized portions of the conus and the  spinal cord shows normal signal intensity  and caliber.     Vertebral body heights are well maintained. Mild heterogeneous bone  marrow signal intensity is normal. Trace anterolisthesis of L4 on L5,  L3 on L4.     At T12-L1 trace retrolisthesis of T12 on L1, diffuse disc bulge, mild  facet joint arthropathy in combination noted causing mild central  spinal canal stenosis and minimal encroachment of the bilateral  foramina.     At L1-L2 mild disc bulge, facet joint arthropathy in combination  noted causing mild central spinal canal stenosis, and mild bilateral  neural foramina narrowing.     At L2-L3 mild disc bulge, facet joint arthropathy, ligamentum flavum  hypertrophy noted causing minimal encroachment of the spinal canal  and bilateral neural foramina.     At L3-L4 trace anterolisthesis of L3 on L4, minimal disc bulge,  significant facet joint arthropathy, mild ligamentum flavum  hypertrophy, no significant central spinal canal or neural foramina  stenosis.     At L4-L5 grade 1 anterolisthesis of L4 on L5, significant facet joint  arthropathy noted causing mild encroachment of the spinal canal and  right neural foramina.     At L5-S1 mild facet joint arthropathy noted, mild disc bulge, without  any significant central spinal canal or neural foramina stenosis.     IMPRESSION:  There is mild degenerative spinal canal or neural foramina stenosis  at T12-L1, L1-L2, L2-L3.         Review of Systems   HENT: Negative.     Eyes: Negative.    Respiratory: Negative.     Cardiovascular: Negative.    Gastrointestinal: Negative.    Endocrine: Negative.    Genitourinary: Negative.    Musculoskeletal:  Positive for back pain and myalgias.   Skin: Negative.    Neurological: Negative.    Hematological: Negative.    Psychiatric/Behavioral: Negative.          Physical Exam  Vitals and nursing note reviewed.   Constitutional:       Appearance: Normal appearance.   HENT:      Head: Normocephalic and atraumatic.      Nose: Nose normal.   Eyes:      Extraocular Movements:  Extraocular movements intact.      Conjunctiva/sclera: Conjunctivae normal.      Pupils: Pupils are equal, round, and reactive to light.   Cardiovascular:      Rate and Rhythm: Normal rate and regular rhythm.      Pulses: Normal pulses.      Heart sounds: Normal heart sounds.   Pulmonary:      Effort: Pulmonary effort is normal.      Breath sounds: Normal breath sounds.   Abdominal:      General: Abdomen is flat. Bowel sounds are normal.      Palpations: Abdomen is soft.   Musculoskeletal:         General: Tenderness present.      Comments: Increased pain with facet loading lateral bending and extension   Skin:     General: Skin is warm.   Neurological:      General: No focal deficit present.      Mental Status: She is alert and oriented to person, place, and time.   Psychiatric:         Mood and Affect: Mood normal.         Behavior: Behavior normal.                      Plan  At least 50% of the visit was involved in the discussion of the options for treatment. We discussed exercises, medication, interventional therapies and surgery. Healthy life style is essential with patient hard work to achieve the wellness. In addition; discussion with the patient and/or family about any of the diagnostic results, impressions and/or recommended diagnostic studies, prognosis, risks and benefits of treatment options, instructions for treatment and/or follow-up, importance of compliance with chosen treatment options, risk-factor reduction, and patient/family education.         Pool therapy, walking in the pool, at least 3x per week for 30 minutes  Continue self-directed physical therapy  Left L3-5 medial branch block under fluoroscopy guidance after lorazepam 2 mg as diagnostic treatment  Core exercises was added to the patient chart  The patient gets Percocet 30 tablets from PCP may get that prior to her trip and may or from us to take with Naprosyn to help with severe pain  Healthy lifestyle and anti-inflammatory diet in  addition to weight control discussed with the patient  Alternative chronic pain therapies was discussed, encouraged and information was handed  Return to Clinic 3 months     *Please note this report has been produced using speech recognition software and may contain errors related to that system including grammar, punctuation and spelling as well as words and phrases that may be inappropriate. If there are questions or concerns, please feel free to contact me to clarify.    Stacey Flaherty MD

## 2024-03-12 ENCOUNTER — OFFICE VISIT (OUTPATIENT)
Dept: PAIN MEDICINE | Facility: CLINIC | Age: 69
End: 2024-03-12
Payer: COMMERCIAL

## 2024-03-12 VITALS
TEMPERATURE: 97.2 F | OXYGEN SATURATION: 98 % | DIASTOLIC BLOOD PRESSURE: 65 MMHG | HEART RATE: 75 BPM | HEIGHT: 66 IN | RESPIRATION RATE: 16 BRPM | SYSTOLIC BLOOD PRESSURE: 123 MMHG | BODY MASS INDEX: 29.73 KG/M2 | WEIGHT: 185 LBS

## 2024-03-12 DIAGNOSIS — M47.817 LUMBOSACRAL SPONDYLOSIS WITHOUT MYELOPATHY: Primary | ICD-10-CM

## 2024-03-12 PROCEDURE — 1159F MED LIST DOCD IN RCRD: CPT | Performed by: ANESTHESIOLOGY

## 2024-03-12 PROCEDURE — 3078F DIAST BP <80 MM HG: CPT | Performed by: ANESTHESIOLOGY

## 2024-03-12 PROCEDURE — 1036F TOBACCO NON-USER: CPT | Performed by: ANESTHESIOLOGY

## 2024-03-12 PROCEDURE — 99214 OFFICE O/P EST MOD 30 MIN: CPT | Performed by: ANESTHESIOLOGY

## 2024-03-12 PROCEDURE — 1125F AMNT PAIN NOTED PAIN PRSNT: CPT | Performed by: ANESTHESIOLOGY

## 2024-03-12 PROCEDURE — 3074F SYST BP LT 130 MM HG: CPT | Performed by: ANESTHESIOLOGY

## 2024-03-12 RX ORDER — LORAZEPAM 2 MG/1
TABLET ORAL
Qty: 1 TABLET | Refills: 0 | Status: SHIPPED | OUTPATIENT
Start: 2024-03-12

## 2024-03-12 SDOH — ECONOMIC STABILITY: FOOD INSECURITY: WITHIN THE PAST 12 MONTHS, YOU WORRIED THAT YOUR FOOD WOULD RUN OUT BEFORE YOU GOT MONEY TO BUY MORE.: NEVER TRUE

## 2024-03-12 SDOH — ECONOMIC STABILITY: FOOD INSECURITY: WITHIN THE PAST 12 MONTHS, THE FOOD YOU BOUGHT JUST DIDN'T LAST AND YOU DIDN'T HAVE MONEY TO GET MORE.: NEVER TRUE

## 2024-03-12 ASSESSMENT — LIFESTYLE VARIABLES
SKIP TO QUESTIONS 9-10: 1
HOW OFTEN DO YOU HAVE SIX OR MORE DRINKS ON ONE OCCASION: NEVER
AUDIT-C TOTAL SCORE: 0
HOW OFTEN DURING THE LAST YEAR HAVE YOU FAILED TO DO WHAT WAS NORMALLY EXPECTED FROM YOU BECAUSE OF DRINKING: NEVER
HOW MANY STANDARD DRINKS CONTAINING ALCOHOL DO YOU HAVE ON A TYPICAL DAY: PATIENT DOES NOT DRINK
HOW OFTEN DURING THE LAST YEAR HAVE YOU FOUND THAT YOU WERE NOT ABLE TO STOP DRINKING ONCE YOU HAD STARTED: NEVER
HOW OFTEN DURING THE LAST YEAR HAVE YOU BEEN UNABLE TO REMEMBER WHAT HAPPENED THE NIGHT BEFORE BECAUSE YOU HAD BEEN DRINKING: NEVER
HAS A RELATIVE, FRIEND, DOCTOR, OR ANOTHER HEALTH PROFESSIONAL EXPRESSED CONCERN ABOUT YOUR DRINKING OR SUGGESTED YOU CUT DOWN: NO
AUDIT TOTAL SCORE: 0
HOW OFTEN DO YOU HAVE A DRINK CONTAINING ALCOHOL: NEVER
HOW OFTEN DURING THE LAST YEAR HAVE YOU NEEDED AN ALCOHOLIC DRINK FIRST THING IN THE MORNING TO GET YOURSELF GOING AFTER A NIGHT OF HEAVY DRINKING: NEVER
HAVE YOU OR SOMEONE ELSE BEEN INJURED AS A RESULT OF YOUR DRINKING: NO
HOW OFTEN DURING THE LAST YEAR HAVE YOU HAD A FEELING OF GUILT OR REMORSE AFTER DRINKING: NEVER

## 2024-03-12 ASSESSMENT — ENCOUNTER SYMPTOMS
CARDIOVASCULAR NEGATIVE: 1
MYALGIAS: 1
EYES NEGATIVE: 1
DEPRESSION: 0
HEMATOLOGIC/LYMPHATIC NEGATIVE: 1
BACK PAIN: 1
ENDOCRINE NEGATIVE: 1
PSYCHIATRIC NEGATIVE: 1
LOSS OF SENSATION IN FEET: 0
RESPIRATORY NEGATIVE: 1
GASTROINTESTINAL NEGATIVE: 1
OCCASIONAL FEELINGS OF UNSTEADINESS: 0
NEUROLOGICAL NEGATIVE: 1

## 2024-03-12 ASSESSMENT — PAIN DESCRIPTION - DESCRIPTORS: DESCRIPTORS: ACHING

## 2024-03-12 ASSESSMENT — PAIN SCALES - GENERAL
PAINLEVEL: 6
PAINLEVEL_OUTOF10: 6

## 2024-03-12 ASSESSMENT — PAIN - FUNCTIONAL ASSESSMENT: PAIN_FUNCTIONAL_ASSESSMENT: 0-10

## 2024-03-12 NOTE — PROGRESS NOTES
This is 68 y.o.  female with who has been treated for  left SI joint  . Pain is unchanged, The pain is described as achiness and is relieved by nothing .   Chief Complaint   Patient presents with    Follow-up     1/15/2024 left SI joint injection     Patient states that the injection given on 1/15/2024 did not help her pain , in fact she states that she even feels the pain going up into her left sided back .    Pain Therapies: Injections

## 2024-04-08 ENCOUNTER — APPOINTMENT (OUTPATIENT)
Dept: PAIN MEDICINE | Facility: CLINIC | Age: 69
End: 2024-04-08
Payer: COMMERCIAL

## 2024-04-08 ENCOUNTER — APPOINTMENT (OUTPATIENT)
Dept: RADIOLOGY | Facility: CLINIC | Age: 69
End: 2024-04-08
Payer: COMMERCIAL

## 2024-04-08 ENCOUNTER — TELEPHONE (OUTPATIENT)
Dept: PAIN MEDICINE | Facility: CLINIC | Age: 69
End: 2024-04-08
Payer: COMMERCIAL

## 2024-04-08 DIAGNOSIS — M43.06 LUMBAR SPONDYLOLYSIS: Primary | ICD-10-CM

## 2024-04-08 NOTE — TELEPHONE ENCOUNTER
----- Message from Mary Alice Bruce sent at 4/4/2024  8:48 AM EDT -----    ----- Message -----  From: Mary Alice Bruce  Sent: 4/3/2024  11:55 AM EDT  To: Stacey Flaherty MD    Are you planning to do RFA sometime after MBB?  If so, that is what insurance Is looking for so if you could document that also, that would be helpful.      ----- Message -----  From: Stacey Flaherty MD  Sent: 4/3/2024  11:36 AM EDT  To: Mary Alice Bruce    Done    ----- Message -----  From: Mary Alice Bruce  Sent: 4/3/2024   9:41 AM EDT  To: Stacey Flaherty MD    Pt scheduled for lumbar mbb on 4/8.  Her ins is denying due to no documentation of future treatments plans (such as RFA).  Can you addend your last note with your plan?  I will then appeal with new documentation.  Thank you.

## 2024-04-09 ENCOUNTER — PATIENT MESSAGE (OUTPATIENT)
Dept: PRIMARY CARE | Facility: CLINIC | Age: 69
End: 2024-04-09
Payer: COMMERCIAL

## 2024-04-09 DIAGNOSIS — M54.50 CHRONIC BILATERAL LOW BACK PAIN, UNSPECIFIED WHETHER SCIATICA PRESENT: ICD-10-CM

## 2024-04-09 DIAGNOSIS — G89.29 CHRONIC BILATERAL LOW BACK PAIN, UNSPECIFIED WHETHER SCIATICA PRESENT: ICD-10-CM

## 2024-04-10 NOTE — TELEPHONE ENCOUNTER
From: Ac Torres  To: Heriberto Keller MD  Sent: 4/9/2024 3:19 PM EDT  Subject: Rx    Dr Keller my appointment for an ablation and epidural steroid injection was for April the 8th was canceled because insurance will not pay for it more detailed information is being sent to them...may I please have a prescription for hydrocodone 5-325 the oxycodone 5-325 just makes me feel sleepy and tired the next day and I was on hydrocodone acetaminophen until the pharmacies ran out of it and that's how I ended up on oxycodone acetaminophen 5-3-25.....my pain level is about an 8 I am so tired of being in pain

## 2024-04-11 RX ORDER — OXYCODONE AND ACETAMINOPHEN 5; 325 MG/1; MG/1
1 TABLET ORAL EVERY 8 HOURS PRN
Qty: 30 TABLET | Refills: 0 | Status: SHIPPED | OUTPATIENT
Start: 2024-04-11 | End: 2024-05-07 | Stop reason: ALTCHOICE

## 2024-04-23 ENCOUNTER — HOSPITAL ENCOUNTER (OUTPATIENT)
Dept: RADIOLOGY | Facility: CLINIC | Age: 69
Discharge: HOME | End: 2024-04-23
Payer: COMMERCIAL

## 2024-04-23 VITALS — WEIGHT: 184.97 LBS | BODY MASS INDEX: 29.73 KG/M2 | HEIGHT: 66 IN

## 2024-04-23 DIAGNOSIS — Z12.31 ENCOUNTER FOR SCREENING MAMMOGRAM FOR MALIGNANT NEOPLASM OF BREAST: ICD-10-CM

## 2024-04-23 PROCEDURE — 77067 SCR MAMMO BI INCL CAD: CPT | Mod: BILATERAL PROCEDURE | Performed by: RADIOLOGY

## 2024-04-23 PROCEDURE — 77067 SCR MAMMO BI INCL CAD: CPT

## 2024-04-23 PROCEDURE — 77063 BREAST TOMOSYNTHESIS BI: CPT | Mod: BILATERAL PROCEDURE | Performed by: RADIOLOGY

## 2024-05-07 ENCOUNTER — PATIENT MESSAGE (OUTPATIENT)
Dept: PRIMARY CARE | Facility: CLINIC | Age: 69
End: 2024-05-07
Payer: COMMERCIAL

## 2024-05-07 ENCOUNTER — TELEPHONE (OUTPATIENT)
Dept: PAIN MEDICINE | Facility: CLINIC | Age: 69
End: 2024-05-07
Payer: COMMERCIAL

## 2024-05-07 DIAGNOSIS — M53.3 SACROILIAC JOINT PAIN: Primary | ICD-10-CM

## 2024-05-07 DIAGNOSIS — J32.0 MAXILLARY SINUSITIS, UNSPECIFIED CHRONICITY: Primary | ICD-10-CM

## 2024-05-07 RX ORDER — AZITHROMYCIN 250 MG/1
TABLET, FILM COATED ORAL DAILY
Qty: 6 TABLET | Refills: 0 | Status: SHIPPED | OUTPATIENT
Start: 2024-05-07 | End: 2024-05-12

## 2024-05-07 RX ORDER — HYDROCODONE BITARTRATE AND ACETAMINOPHEN 5; 325 MG/1; MG/1
1 TABLET ORAL EVERY 6 HOURS PRN
Qty: 30 TABLET | Refills: 0 | Status: SHIPPED | OUTPATIENT
Start: 2024-05-07 | End: 2024-05-17

## 2024-05-07 NOTE — TELEPHONE ENCOUNTER
Orders Placed This Encounter   Procedures    Sacroiliac Joint Injection    [place] fluoro images no charge

## 2024-05-07 NOTE — TELEPHONE ENCOUNTER
"From: Ac Torres  To: Heriberto Keller  Sent: 5/7/2024 1:40 PM EDT  Subject: Rx    Hi Dr Keller last month I had requested a prescription for \"hydrocodone acetaminophen 5-325\" but was given oxycodone acetaminophen which I had to use because I'm in so much pain but I don't like it it makes me feel drowsy the next day and with no energy and I don't like that feeling.... prior to oxycodone acetaminophen I had been taking hydrocodone acetaminophen until the pharmacies no longer had it but now they do have it and that's what I'm requesting a refill of the hydrocodone acetaminophen 5-325 not oxycodone acetaminophen... my procedure for my back still has not been scheduled my insurance will not cover it so I've sent pain management an email to see if they can do something else... also is it possible to get a z-pack to break up this cough and whistling sound  "

## 2024-05-14 ENCOUNTER — PATIENT MESSAGE (OUTPATIENT)
Dept: PRIMARY CARE | Facility: CLINIC | Age: 69
End: 2024-05-14
Payer: COMMERCIAL

## 2024-05-14 DIAGNOSIS — R05.2 SUBACUTE COUGH: Primary | ICD-10-CM

## 2024-05-14 RX ORDER — BENZONATATE 200 MG/1
200 CAPSULE ORAL 3 TIMES DAILY PRN
Qty: 42 CAPSULE | Refills: 0 | Status: SHIPPED | OUTPATIENT
Start: 2024-05-14 | End: 2024-06-13

## 2024-05-14 NOTE — TELEPHONE ENCOUNTER
From: Ac Torres  To: Heriberto Keller  Sent: 5/14/2024 9:58 AM EDT  Subject: Rx    Good morning Dr Keller can I be prescribed something for my cough at night time I'm coughing a lot at night and early morning throughout the day I'm coughing but not as bad and I am coughing up yellow mucus but I have a procedure scheduled for Monday so I'm trying not to be coughing a lot

## 2024-05-20 ENCOUNTER — HOSPITAL ENCOUNTER (OUTPATIENT)
Dept: RADIOLOGY | Facility: CLINIC | Age: 69
Discharge: HOME | End: 2024-05-20
Payer: COMMERCIAL

## 2024-05-20 ENCOUNTER — HOSPITAL ENCOUNTER (OUTPATIENT)
Dept: PAIN MEDICINE | Facility: CLINIC | Age: 69
Discharge: HOME | End: 2024-05-20
Payer: COMMERCIAL

## 2024-05-20 VITALS
DIASTOLIC BLOOD PRESSURE: 67 MMHG | TEMPERATURE: 97.2 F | OXYGEN SATURATION: 97 % | SYSTOLIC BLOOD PRESSURE: 123 MMHG | HEART RATE: 87 BPM | RESPIRATION RATE: 18 BRPM

## 2024-05-20 DIAGNOSIS — M53.3 SACROILIAC JOINT PAIN: ICD-10-CM

## 2024-05-20 PROCEDURE — 7100000010 HC PHASE TWO TIME - EACH INCREMENTAL 1 MINUTE

## 2024-05-20 PROCEDURE — 7100000009 HC PHASE TWO TIME - INITIAL BASE CHARGE

## 2024-05-20 PROCEDURE — 2500000005 HC RX 250 GENERAL PHARMACY W/O HCPCS

## 2024-05-20 PROCEDURE — 27096 INJECT SACROILIAC JOINT: CPT | Performed by: ANESTHESIOLOGY

## 2024-05-20 PROCEDURE — 2500000004 HC RX 250 GENERAL PHARMACY W/ HCPCS (ALT 636 FOR OP/ED)

## 2024-05-20 RX ORDER — LIDOCAINE HYDROCHLORIDE 5 MG/ML
INJECTION, SOLUTION INFILTRATION; INTRAVENOUS
Status: COMPLETED
Start: 2024-05-20 | End: 2024-05-20

## 2024-05-20 RX ORDER — BUPIVACAINE HYDROCHLORIDE 7.5 MG/ML
INJECTION, SOLUTION EPIDURAL; RETROBULBAR
Status: COMPLETED
Start: 2024-05-20 | End: 2024-05-20

## 2024-05-20 RX ORDER — TRIAMCINOLONE ACETONIDE 40 MG/ML
INJECTION, SUSPENSION INTRA-ARTICULAR; INTRAMUSCULAR
Status: COMPLETED
Start: 2024-05-20 | End: 2024-05-20

## 2024-05-20 RX ADMIN — BUPIVACAINE HYDROCHLORIDE 75 MG: 7.5 INJECTION, SOLUTION EPIDURAL; RETROBULBAR at 10:34

## 2024-05-20 RX ADMIN — LIDOCAINE HYDROCHLORIDE 250 MG: 5 INJECTION, SOLUTION INFILTRATION at 10:34

## 2024-05-20 RX ADMIN — TRIAMCINOLONE ACETONIDE 40 MG: 40 INJECTION, SUSPENSION INTRA-ARTICULAR; INTRAMUSCULAR at 10:34

## 2024-05-20 ASSESSMENT — ENCOUNTER SYMPTOMS
NEUROLOGICAL NEGATIVE: 1
GASTROINTESTINAL NEGATIVE: 1
HEMATOLOGIC/LYMPHATIC NEGATIVE: 1
BACK PAIN: 1
CARDIOVASCULAR NEGATIVE: 1
DEPRESSION: 0
RESPIRATORY NEGATIVE: 1
EYES NEGATIVE: 1
LOSS OF SENSATION IN FEET: 0
OCCASIONAL FEELINGS OF UNSTEADINESS: 0
PSYCHIATRIC NEGATIVE: 1
ENDOCRINE NEGATIVE: 1

## 2024-05-20 ASSESSMENT — COLUMBIA-SUICIDE SEVERITY RATING SCALE - C-SSRS
2. HAVE YOU ACTUALLY HAD ANY THOUGHTS OF KILLING YOURSELF?: NO
1. IN THE PAST MONTH, HAVE YOU WISHED YOU WERE DEAD OR WISHED YOU COULD GO TO SLEEP AND NOT WAKE UP?: NO
6. HAVE YOU EVER DONE ANYTHING, STARTED TO DO ANYTHING, OR PREPARED TO DO ANYTHING TO END YOUR LIFE?: NO

## 2024-05-20 ASSESSMENT — PAIN SCALES - GENERAL
PAINLEVEL_OUTOF10: 8
PAINLEVEL_OUTOF10: 8
PAINLEVEL_OUTOF10: 7

## 2024-05-20 ASSESSMENT — PAIN - FUNCTIONAL ASSESSMENT: PAIN_FUNCTIONAL_ASSESSMENT: 0-10

## 2024-05-20 NOTE — Clinical Note
Prepped with ChloraPrep, a minimum of 3 minute dry time, longer if needed, no pooling noted, patient draped in sterile fashion. Lower back pain

## 2024-05-20 NOTE — OP NOTE
Pre-op diagnosis: Sacroiliitis  Postop diagnosis: The same    PROCEDURE: left sacroiliac joint Injection     Estimated blood loss: None  Complications: None        INDICATIONS: 68 y.o. female is a pleasant person with a significant history of lower back pain with radiation to lower to gluteal area.  Physical exam and radiological findings correlate with the pre-operative diagnoses.  Because of these findings, we have elected to proceed with the above injection.    PROCEDURE: After sufficient consent was signed, the patient was brought to the Operating Room and was placed in the prone position with a pillow underneath the hips. The back was prepped and draped in the usual sterile fashion.     Under fluoroscopic guidance the left SI joint was identified. The skin at the entry site was infiltrated with 0.5% Lidocaine  using a size #25-gauge needle. A size Spinal needle 22-gauge, 3.5 inch was introduced gradually until the joint was encountered. The needle was advanced into the SI joint. Next, 1 cc of Omnipaque 300 was injected and showed excellent distribution of the dye into the joint.  At that time, Depo-Medrol 40 mg plus 3cc of  0.75% Bupivacaine were injected. The needle was flushed and removed.          The patient tolerated the procedure very well and was transferred to the Recovery Room in stable condition.  She had stable resolution of symptoms.  She is to follow-up in the [facility] as scheduled.

## 2024-05-20 NOTE — H&P (VIEW-ONLY)
History Of Present Illness  Heydi Torres is a 68 y.o. female presenting with left gluteal pain associated with left SI joint pain patient has no changes in health medical management or allergies since her last visit on 3/12/2024.     Past Medical History  Past Medical History:   Diagnosis Date   • Personal history of other endocrine, nutritional and metabolic disease 07/01/2022    History of hyperlipidemia   • Personal history of other medical treatment 01/19/2021    History of screening mammography       Surgical History  Past Surgical History:   Procedure Laterality Date   • KNEE Right    • OTHER SURGICAL HISTORY  03/08/2021    Laparoscopy   • ROTATOR CUFF REPAIR Bilateral    • ULNAR NERVE REPAIR          Social History  She reports that she has never smoked. She has never used smokeless tobacco. She reports that she does not currently use alcohol. She reports that she does not use drugs.    Family History  Family History   Problem Relation Name Age of Onset   • Heart disease Mother     • Hypertension Mother     • COPD Mother     • Kidney cancer Father     • Hypertension Father     • Heart attack Father     • Lung cancer Sister     • Diabetes Sister     • Hypertension Sister     • Heart disease Maternal Grandmother     • Hypertension Maternal Grandmother          Allergies  Amoxicillin-pot clavulanate, Atorvastatin, Latex, Rosuvastatin, Topiramate, Meloxicam, Nortriptyline, and Pseudoephedrine hcl    Review of Systems   HENT: Negative.     Eyes: Negative.    Respiratory: Negative.     Cardiovascular: Negative.    Gastrointestinal: Negative.    Endocrine: Negative.    Genitourinary: Negative.    Musculoskeletal:  Positive for back pain.   Skin: Negative.    Neurological: Negative.    Hematological: Negative.    Psychiatric/Behavioral: Negative.        Physical Exam  Vitals and nursing note reviewed.   Constitutional:       Appearance: Normal appearance.   HENT:      Head: Normocephalic and atraumatic.      Nose:  Nose normal.   Eyes:      Extraocular Movements: Extraocular movements intact.      Conjunctiva/sclera: Conjunctivae normal.      Pupils: Pupils are equal, round, and reactive to light.   Cardiovascular:      Rate and Rhythm: Normal rate and regular rhythm.      Pulses: Normal pulses.      Heart sounds: Normal heart sounds.   Pulmonary:      Effort: Pulmonary effort is normal.      Breath sounds: Normal breath sounds.   Abdominal:      General: Abdomen is flat. Bowel sounds are normal.      Palpations: Abdomen is soft.   Musculoskeletal:         General: Tenderness present.   Skin:     General: Skin is warm.   Neurological:      General: No focal deficit present.      Mental Status: She is alert and oriented to person, place, and time.   Psychiatric:         Mood and Affect: Mood normal.         Behavior: Behavior normal.      Last Recorded Vitals  Blood pressure 123/67, pulse 87, temperature 36.2 °C (97.2 °F), resp. rate 18, SpO2 97%.    Relevant Results             Assessment/Plan   Active Problems:  There are no active Hospital Problems.      Plan for left SI joint injection was agreed upon             Stacey Flaherty MD

## 2024-05-21 ENCOUNTER — TELEPHONE (OUTPATIENT)
Dept: PAIN MEDICINE | Facility: CLINIC | Age: 69
End: 2024-05-21
Payer: COMMERCIAL

## 2024-05-21 DIAGNOSIS — M47.816 LUMBAR SPONDYLOSIS: Primary | ICD-10-CM

## 2024-05-21 NOTE — TELEPHONE ENCOUNTER
Orders Placed This Encounter   Procedures     Medial Nerve Branch Block    [place] fluoro images no charge         ----- Message from Mary Alice Bruce sent at 5/21/2024  3:29 PM EDT -----  Regarding: MBB  Ok.  Can you please enter the order for the MBB so I can schedule her?  Thx    ----- Message -----  From: Stacey Flaherty MD  Sent: 5/20/2024   3:10 PM EDT  To: Mary Alice Bruce    2-4 weeks  ----- Message -----  From: Mary Alice Bruce  Sent: 5/20/2024   2:50 PM EDT  To: Stacey Flaherty MD    Ok, how long does she need to wait to have the mbb from today's sij injection?    ----- Message -----  From: Stacey Flaherty MD  Sent: 5/20/2024   2:31 PM EDT  To: Mary Alice Bruce    No I used steroid for the medial branch block because I will get longer period of pain relief from it which become therapeutic injections  ----- Message -----  From: Mary Alice Bruce  Sent: 5/20/2024   2:21 PM EDT  To: Stacey Flaherty MD    After 2 appeals, insurance finally overturned denial for 2nd MBB. Pt had SIJ injection today but I don't have to wait to scheduled mbb because there will be no steroid for diagnostic MBB. Is that correct?

## 2024-05-22 ENCOUNTER — TELEPHONE (OUTPATIENT)
Dept: PAIN MEDICINE | Facility: CLINIC | Age: 69
End: 2024-05-22
Payer: COMMERCIAL

## 2024-05-22 DIAGNOSIS — M43.06 LUMBAR SPONDYLOLYSIS: Primary | ICD-10-CM

## 2024-05-22 RX ORDER — LORAZEPAM 2 MG/1
TABLET ORAL
Qty: 1 TABLET | Refills: 0 | Status: SHIPPED | OUTPATIENT
Start: 2024-05-22

## 2024-05-22 NOTE — TELEPHONE ENCOUNTER
----- Message from Mary Alice Bruce sent at 5/21/2024  4:11 PM EDT -----  Regarding: RE: MBB  She is asking for a 1x dose of lorazepam to be sent to her pharmacy for before her injection.    ----- Message -----  From: Stacey Flaherty MD  Sent: 5/21/2024   3:51 PM EDT  To: Mary Alice Bruce  Subject: RE: MBB                                          done  ----- Message -----  From: Mary Alice Bruce  Sent: 5/21/2024   3:29 PM EDT  To: Stacey Flaherty MD  Subject: MBB                                              Ok.  Can you please enter the order for the MBB so I can schedule her?  Thx    ----- Message -----  From: Stacey Flaherty MD  Sent: 5/20/2024   3:10 PM EDT  To: Mary Alice Bruce    2-4 weeks  ----- Message -----  From: Mary Alice Bruce  Sent: 5/20/2024   2:50 PM EDT  To: Stacey Flaherty MD    Ok, how long does she need to wait to have the mbb from today's sij injection?    ----- Message -----  From: Stacey Flaherty MD  Sent: 5/20/2024   2:31 PM EDT  To: Mary Alice Bruce    No I used steroid for the medial branch block because I will get longer period of pain relief from it which become therapeutic injections  ----- Message -----  From: Mary Alice Bruce  Sent: 5/20/2024   2:21 PM EDT  To: Stacey Flaherty MD    After 2 appeals, insurance finally overturned denial for 2nd MBB. Pt had SIJ injection today but I don't have to wait to scheduled mbb because there will be no steroid for diagnostic MBB. Is that correct?

## 2024-05-27 ENCOUNTER — PATIENT MESSAGE (OUTPATIENT)
Dept: PRIMARY CARE | Facility: CLINIC | Age: 69
End: 2024-05-27
Payer: COMMERCIAL

## 2024-05-27 DIAGNOSIS — J01.90 ACUTE NON-RECURRENT SINUSITIS, UNSPECIFIED LOCATION: Primary | ICD-10-CM

## 2024-05-28 DIAGNOSIS — J30.1 SEASONAL ALLERGIC RHINITIS DUE TO POLLEN: ICD-10-CM

## 2024-05-28 DIAGNOSIS — L50.8 CHRONIC URTICARIA: ICD-10-CM

## 2024-05-28 RX ORDER — LEVOCETIRIZINE DIHYDROCHLORIDE 5 MG/1
5 TABLET, FILM COATED ORAL DAILY
Qty: 90 TABLET | Refills: 3 | Status: SHIPPED | OUTPATIENT
Start: 2024-05-28 | End: 2025-05-28

## 2024-05-28 RX ORDER — AZITHROMYCIN 250 MG/1
TABLET, FILM COATED ORAL DAILY
Qty: 6 TABLET | Refills: 0 | Status: SHIPPED | OUTPATIENT
Start: 2024-05-28 | End: 2024-06-02

## 2024-06-17 ENCOUNTER — APPOINTMENT (OUTPATIENT)
Dept: RADIOLOGY | Facility: CLINIC | Age: 69
End: 2024-06-17
Payer: COMMERCIAL

## 2024-06-17 ENCOUNTER — HOSPITAL ENCOUNTER (OUTPATIENT)
Dept: PAIN MEDICINE | Facility: CLINIC | Age: 69
Discharge: HOME | End: 2024-06-17
Payer: COMMERCIAL

## 2024-06-17 VITALS
DIASTOLIC BLOOD PRESSURE: 80 MMHG | OXYGEN SATURATION: 97 % | HEART RATE: 86 BPM | RESPIRATION RATE: 16 BRPM | TEMPERATURE: 97.2 F | SYSTOLIC BLOOD PRESSURE: 120 MMHG

## 2024-06-17 DIAGNOSIS — M47.816 LUMBAR SPONDYLOSIS: ICD-10-CM

## 2024-06-17 PROCEDURE — 64493 INJ PARAVERT F JNT L/S 1 LEV: CPT | Performed by: ANESTHESIOLOGY

## 2024-06-17 PROCEDURE — 64494 INJ PARAVERT F JNT L/S 2 LEV: CPT | Performed by: ANESTHESIOLOGY

## 2024-06-17 PROCEDURE — 2500000005 HC RX 250 GENERAL PHARMACY W/O HCPCS

## 2024-06-17 PROCEDURE — 2500000004 HC RX 250 GENERAL PHARMACY W/ HCPCS (ALT 636 FOR OP/ED)

## 2024-06-17 RX ORDER — TRIAMCINOLONE ACETONIDE 40 MG/ML
INJECTION, SUSPENSION INTRA-ARTICULAR; INTRAMUSCULAR
Status: COMPLETED
Start: 2024-06-17 | End: 2024-06-17

## 2024-06-17 RX ORDER — LIDOCAINE HYDROCHLORIDE 5 MG/ML
INJECTION, SOLUTION INFILTRATION; INTRAVENOUS
Status: COMPLETED
Start: 2024-06-17 | End: 2024-06-17

## 2024-06-17 RX ORDER — BUPIVACAINE HYDROCHLORIDE 7.5 MG/ML
INJECTION, SOLUTION EPIDURAL; RETROBULBAR
Status: COMPLETED
Start: 2024-06-17 | End: 2024-06-17

## 2024-06-17 ASSESSMENT — COLUMBIA-SUICIDE SEVERITY RATING SCALE - C-SSRS
1. IN THE PAST MONTH, HAVE YOU WISHED YOU WERE DEAD OR WISHED YOU COULD GO TO SLEEP AND NOT WAKE UP?: NO
2. HAVE YOU ACTUALLY HAD ANY THOUGHTS OF KILLING YOURSELF?: NO
6. HAVE YOU EVER DONE ANYTHING, STARTED TO DO ANYTHING, OR PREPARED TO DO ANYTHING TO END YOUR LIFE?: NO

## 2024-06-17 ASSESSMENT — PAIN SCALES - GENERAL
PAINLEVEL_OUTOF10: 8
PAINLEVEL_OUTOF10: 7

## 2024-06-17 ASSESSMENT — PAIN - FUNCTIONAL ASSESSMENT: PAIN_FUNCTIONAL_ASSESSMENT: 0-10

## 2024-06-17 ASSESSMENT — ENCOUNTER SYMPTOMS
LOSS OF SENSATION IN FEET: 0
OCCASIONAL FEELINGS OF UNSTEADINESS: 0
DEPRESSION: 0

## 2024-06-17 NOTE — Clinical Note
Patient in room , Patient identified . Universal protocal , and preprocedure check list ,patient postioned , site prepped .

## 2024-06-17 NOTE — Clinical Note
Patient tolerated the procedure well and is comfortable with no complaints of pain. Vital signs stable. Arousable prior to transport. Patient transported to PACU via wheelchair. Handoff completed.

## 2024-07-10 ENCOUNTER — APPOINTMENT (OUTPATIENT)
Dept: PRIMARY CARE | Facility: CLINIC | Age: 69
End: 2024-07-10
Payer: COMMERCIAL

## 2024-07-10 VITALS
BODY MASS INDEX: 29.66 KG/M2 | TEMPERATURE: 97.7 F | HEIGHT: 65 IN | SYSTOLIC BLOOD PRESSURE: 123 MMHG | WEIGHT: 178 LBS | HEART RATE: 74 BPM | DIASTOLIC BLOOD PRESSURE: 75 MMHG | OXYGEN SATURATION: 95 %

## 2024-07-10 DIAGNOSIS — N18.31 HYPERTENSIVE KIDNEY DISEASE WITH STAGE 3A CHRONIC KIDNEY DISEASE (MULTI): ICD-10-CM

## 2024-07-10 DIAGNOSIS — D23.4 CYST, DERMOID, SCALP AND NECK: ICD-10-CM

## 2024-07-10 DIAGNOSIS — M54.40 CHRONIC LOW BACK PAIN WITH SCIATICA, SCIATICA LATERALITY UNSPECIFIED, UNSPECIFIED BACK PAIN LATERALITY: ICD-10-CM

## 2024-07-10 DIAGNOSIS — F41.1 GENERALIZED ANXIETY DISORDER: ICD-10-CM

## 2024-07-10 DIAGNOSIS — I12.9 HYPERTENSIVE KIDNEY DISEASE WITH STAGE 3A CHRONIC KIDNEY DISEASE (MULTI): ICD-10-CM

## 2024-07-10 DIAGNOSIS — E78.00 PURE HYPERCHOLESTEROLEMIA: ICD-10-CM

## 2024-07-10 DIAGNOSIS — G89.29 CHRONIC LOW BACK PAIN WITH SCIATICA, SCIATICA LATERALITY UNSPECIFIED, UNSPECIFIED BACK PAIN LATERALITY: ICD-10-CM

## 2024-07-10 DIAGNOSIS — E55.9 VITAMIN D DEFICIENCY: ICD-10-CM

## 2024-07-10 DIAGNOSIS — Z00.00 MEDICARE ANNUAL WELLNESS VISIT, SUBSEQUENT: Primary | ICD-10-CM

## 2024-07-10 DIAGNOSIS — Z28.21 23-VALENT PNEUMOCOCCAL POLYSACCHARIDE VACCINE DECLINED: ICD-10-CM

## 2024-07-10 DIAGNOSIS — Z79.899 HIGH RISK MEDICATION USE: ICD-10-CM

## 2024-07-10 DIAGNOSIS — R73.03 PREDIABETES: ICD-10-CM

## 2024-07-10 DIAGNOSIS — Z00.00 PHYSICAL EXAM: ICD-10-CM

## 2024-07-10 PROCEDURE — G0439 PPPS, SUBSEQ VISIT: HCPCS | Performed by: INTERNAL MEDICINE

## 2024-07-10 PROCEDURE — 3078F DIAST BP <80 MM HG: CPT | Performed by: INTERNAL MEDICINE

## 2024-07-10 PROCEDURE — 82570 ASSAY OF URINE CREATININE: CPT

## 2024-07-10 PROCEDURE — 80354 DRUG SCREENING FENTANYL: CPT

## 2024-07-10 PROCEDURE — 1170F FXNL STATUS ASSESSED: CPT | Performed by: INTERNAL MEDICINE

## 2024-07-10 PROCEDURE — 80368 SEDATIVE HYPNOTICS: CPT

## 2024-07-10 PROCEDURE — 80373 DRUG SCREENING TRAMADOL: CPT

## 2024-07-10 PROCEDURE — 80346 BENZODIAZEPINES1-12: CPT

## 2024-07-10 PROCEDURE — 99397 PER PM REEVAL EST PAT 65+ YR: CPT | Performed by: INTERNAL MEDICINE

## 2024-07-10 PROCEDURE — 80361 OPIATES 1 OR MORE: CPT

## 2024-07-10 PROCEDURE — 80365 DRUG SCREENING OXYCODONE: CPT

## 2024-07-10 PROCEDURE — 1159F MED LIST DOCD IN RCRD: CPT | Performed by: INTERNAL MEDICINE

## 2024-07-10 PROCEDURE — 80358 DRUG SCREENING METHADONE: CPT

## 2024-07-10 PROCEDURE — 80307 DRUG TEST PRSMV CHEM ANLYZR: CPT

## 2024-07-10 PROCEDURE — 3074F SYST BP LT 130 MM HG: CPT | Performed by: INTERNAL MEDICINE

## 2024-07-10 PROCEDURE — 99214 OFFICE O/P EST MOD 30 MIN: CPT | Performed by: INTERNAL MEDICINE

## 2024-07-10 PROCEDURE — 1036F TOBACCO NON-USER: CPT | Performed by: INTERNAL MEDICINE

## 2024-07-10 ASSESSMENT — PATIENT HEALTH QUESTIONNAIRE - PHQ9
7. TROUBLE CONCENTRATING ON THINGS, SUCH AS READING THE NEWSPAPER OR WATCHING TELEVISION: NOT AT ALL
SUM OF ALL RESPONSES TO PHQ QUESTIONS 1-9: 1
1. LITTLE INTEREST OR PLEASURE IN DOING THINGS: NOT AT ALL
5. POOR APPETITE OR OVEREATING: NOT AT ALL
6. FEELING BAD ABOUT YOURSELF - OR THAT YOU ARE A FAILURE OR HAVE LET YOURSELF OR YOUR FAMILY DOWN: NOT AT ALL
2. FEELING DOWN, DEPRESSED OR HOPELESS: NOT AT ALL
3. TROUBLE FALLING OR STAYING ASLEEP OR SLEEPING TOO MUCH: SEVERAL DAYS
9. THOUGHTS THAT YOU WOULD BE BETTER OFF DEAD, OR OF HURTING YOURSELF: NOT AT ALL
8. MOVING OR SPEAKING SO SLOWLY THAT OTHER PEOPLE COULD HAVE NOTICED. OR THE OPPOSITE, BEING SO FIGETY OR RESTLESS THAT YOU HAVE BEEN MOVING AROUND A LOT MORE THAN USUAL: NOT AT ALL
SUM OF ALL RESPONSES TO PHQ9 QUESTIONS 1 AND 2: 0
SUM OF ALL RESPONSES TO PHQ9 QUESTIONS 1 AND 2: 0
1. LITTLE INTEREST OR PLEASURE IN DOING THINGS: NOT AT ALL
10. IF YOU CHECKED OFF ANY PROBLEMS, HOW DIFFICULT HAVE THESE PROBLEMS MADE IT FOR YOU TO DO YOUR WORK, TAKE CARE OF THINGS AT HOME, OR GET ALONG WITH OTHER PEOPLE: NOT DIFFICULT AT ALL
4. FEELING TIRED OR HAVING LITTLE ENERGY: NOT AT ALL
2. FEELING DOWN, DEPRESSED OR HOPELESS: NOT AT ALL

## 2024-07-10 ASSESSMENT — ENCOUNTER SYMPTOMS
OCCASIONAL FEELINGS OF UNSTEADINESS: 0
LOSS OF SENSATION IN FEET: 0
DEPRESSION: 0

## 2024-07-10 ASSESSMENT — ACTIVITIES OF DAILY LIVING (ADL)
DOING_HOUSEWORK: INDEPENDENT
TAKING_MEDICATION: INDEPENDENT
DRESSING: INDEPENDENT
BATHING: INDEPENDENT
MANAGING_FINANCES: INDEPENDENT
GROCERY_SHOPPING: INDEPENDENT

## 2024-07-10 NOTE — PROGRESS NOTES
"SUBJECTIVE:   Ac Torres \"Heydi\" is a 69 y.o. female presenting for her annual physical/wellness.  PCP: Heriberto Keller MD  Medicare wellness, physical, med refills, controlled med Follow up       Colon screening: Colonoscopy 2018 at [facility], [place], no polyps. q10y   Last pap: na  Last mammogram: Up to date   Dexa Up to date   Vaccines pt declined  Diet:   healthy in general  Exercises: somewhat regularly  Lab Results   Component Value Date    HGBA1C 5.7 (H) 11/02/2023     Lab Results   Component Value Date    CREATININE 1.06 (H) 07/10/2023     Lab Results   Component Value Date    WBC 6.5 07/10/2023    HGB 13.4 07/10/2023    HCT 43.4 07/10/2023    MCV 89 07/10/2023     07/10/2023     Lab Results   Component Value Date    CHOL 214 (H) 11/02/2023    CHOL 241 (H) 07/10/2023    CHOL 212 (H) 12/28/2021     Lab Results   Component Value Date    HDL 43.6 11/02/2023    HDL 49.6 07/10/2023    HDL 38.4 (A) 12/28/2021     Lab Results   Component Value Date    LDLCALC 141 (H) 11/02/2023     Lab Results   Component Value Date    TRIG 148 11/02/2023    TRIG 178 (H) 07/10/2023    TRIG 154 (H) 12/28/2021     No components found for: \"CHOLHDL\"       ROS:   Feeling well. No dyspnea or chest pain on exertion. No abdominal pain, change in bowel habits, black or bloody stools. No urinary tract or  symptoms.  No breast concerns. No neurological complaints.    OBJECTIVE:   The patient appears well, alert, oriented x 3, in no distress.   /75   Pulse 74   Temp 36.5 °C (97.7 °F)   Ht 1.651 m (5' 5\")   Wt 80.7 kg (178 lb)   LMP  (LMP Unknown)   SpO2 95%   BMI 29.62 kg/m²   ENT normal.  Neck supple. No adenopathy or thyromegaly. RADHA. Lungs are clear, good air entry, no wheezes, rhonchi or rales. S1 and S2 normal, no murmurs, regular rate and rhythm. Abdomen is soft without tenderness, guarding, mass or organomegaly.  exam deferred to Gyn. Extremities show no edema, normal peripheral pulses. Neurological is normal without " focal findings.  Her back is uncomfortable lying flat on the table, but better when she had knees bent.    1. Medicare annual wellness visit, subsequent        2. Physical exam  CBC, Comprehensive Metabolic Panel, Hemoglobin A1C, Lipid Panel      3. High risk medication use  Opiate/Opioid/Benzo Prescription Compliance, OOB Internal Tracking      4. Prediabetes  CBC, Comprehensive Metabolic Panel      5. Hypertensive kidney disease with stage 3a chronic kidney disease (Multi)  CBC, Albumin-Creatinine Ratio, Urine Random      6. Pure hypercholesterolemia  CBC, Lipid Panel      7. Vitamin D deficiency  Vitamin D 25-Hydroxy,Total (for eval of Vitamin D levels)      8. 23-valent pneumococcal polysaccharide vaccine declined        9. Generalized anxiety disorder        10. Chronic low back pain with sciatica, sciatica laterality unspecified, unspecified back pain laterality        11. Cyst, dermoid, scalp and neck  Referral to General Surgery         OARRS:  Heriberto Keller MD on 7/10/2024  9:20 AM  I have personally reviewed the OARRS report for Ac Torres. I have considered the risks of abuse, dependence, addiction and diversion    Is the patient prescribed a combination of a benzodiazepine and opioid?  Yes, I feel it is clincially indicated to continue the medication and have discussed with the patient risks/benefits/alternatives.    Last Urine Drug Screen / ordered today: Yes  No results found for this or any previous visit (from the past 8760 hour(s)).  N/A        Controlled Substance Agreement:  Date of the Last Agreement: today  Reviewed Controlled Substance Agreement including but not limited to the benefits, risks, and alternatives to treatment with a Controlled Substance medication(s).    Opioids:  What is the patient's goal of therapy? Chronic low back pain  Is this being achieved with current treatment? yes    I have calculated the patient's Morphine Dose Equivalent (MED):   I have considered referral to  Pain Management and/or a specialist, and do not feel it is necessary at this time.    I feel that it is clinically indicated to continue this current medication regimen after consideration of alternative therapies, and other non-opioid treatment.    Opioid Risk Screening:  Family History of Substance Abuse  Alcohol: 0 (10/6/2023  8:00 AM)  Illegal Dru (10/6/2023  8:00 AM)  Prescription Dru (10/6/2023  8:00 AM)    Personal History of Substance Abuse  Alcohol: 0 (10/6/2023  8:00 AM)  Illegal Drugs: 0 (10/6/2023  8:00 AM)  Prescription Drugs: 0 (10/6/2023  8:00 AM)    Patient Age (16-45)  Age (16-45): 0 (10/6/2023  8:00 AM)    History of Preadolescent Sexual Abuse  History of Preadolescent Sexual Abuse: 0 (10/6/2023  8:00 AM)    Psychological Disease  Attention Deficit Disorder, Obsessive Compulsive Disorder, Bipolar, Schizophrenia: 0 (10/6/2023  8:00 AM)  Depression: 0 (10/6/2023  8:00 AM)    Total Score  Total Score: 0 (10/6/2023  8:00 AM)    Total Score Risk Category  TOTAL SCORE CATEGORY: Low Risk (0-3) (10/6/2023  8:00 AM)      Benzodiazepines:  What is the patient's goal of therapy? anxiety  Is this being achieved with current treatment? yes      Activities of Daily Living:   Is your overall impression that this patient is benefiting (symptom reduction outweighs side effects) from benzodiazepine therapy? Yes     1. Physical Functioning: Better  2. Family Relationship: Better  3. Social Relationship: Better  4. Mood: Better  5. Sleep Patterns: Better  6. Overall Function: Better

## 2024-07-11 ENCOUNTER — APPOINTMENT (OUTPATIENT)
Dept: PAIN MEDICINE | Facility: CLINIC | Age: 69
End: 2024-07-11
Payer: COMMERCIAL

## 2024-07-11 LAB
AMPHETAMINES UR QL SCN: NORMAL
BARBITURATES UR QL SCN: NORMAL
BZE UR QL SCN: NORMAL
CANNABINOIDS UR QL SCN: NORMAL
CREAT UR-MCNC: 294.7 MG/DL (ref 20–320)
PCP UR QL SCN: NORMAL

## 2024-07-12 LAB
1OH-MIDAZOLAM UR CFM-MCNC: <25 NG/ML
6MAM UR CFM-MCNC: <25 NG/ML
7AMINOCLONAZEPAM UR CFM-MCNC: <25 NG/ML
A-OH ALPRAZ UR CFM-MCNC: <25 NG/ML
ALPRAZ UR CFM-MCNC: <25 NG/ML
CHLORDIAZEP UR CFM-MCNC: <25 NG/ML
CLONAZEPAM UR CFM-MCNC: <25 NG/ML
CODEINE UR CFM-MCNC: <50 NG/ML
DIAZEPAM UR CFM-MCNC: <25 NG/ML
EDDP UR CFM-MCNC: <25 NG/ML
FENTANYL UR CFM-MCNC: <2.5 NG/ML
HYDROCODONE CTO UR CFM-MCNC: 143 NG/ML
HYDROMORPHONE UR CFM-MCNC: 93 NG/ML
LORAZEPAM UR CFM-MCNC: 333 NG/ML
METHADONE UR CFM-MCNC: <25 NG/ML
MIDAZOLAM UR CFM-MCNC: <25 NG/ML
MORPHINE UR CFM-MCNC: <50 NG/ML
NORDIAZEPAM UR CFM-MCNC: <25 NG/ML
NORFENTANYL UR CFM-MCNC: <2.5 NG/ML
NORHYDROCODONE UR CFM-MCNC: 745 NG/ML
NOROXYCODONE UR CFM-MCNC: <25 NG/ML
NORTRAMADOL UR-MCNC: <50 NG/ML
OXAZEPAM UR CFM-MCNC: <25 NG/ML
OXYCODONE UR CFM-MCNC: <25 NG/ML
OXYMORPHONE UR CFM-MCNC: <25 NG/ML
TEMAZEPAM UR CFM-MCNC: <25 NG/ML
TRAMADOL UR CFM-MCNC: <50 NG/ML
ZOLPIDEM UR CFM-MCNC: <25 NG/ML
ZOLPIDEM UR-MCNC: <25 NG/ML

## 2024-07-28 ENCOUNTER — PATIENT MESSAGE (OUTPATIENT)
Dept: PRIMARY CARE | Facility: CLINIC | Age: 69
End: 2024-07-28
Payer: COMMERCIAL

## 2024-07-28 DIAGNOSIS — J01.90 ACUTE NON-RECURRENT SINUSITIS, UNSPECIFIED LOCATION: Primary | ICD-10-CM

## 2024-07-29 RX ORDER — AZITHROMYCIN 250 MG/1
TABLET, FILM COATED ORAL
Qty: 6 TABLET | Refills: 0 | Status: SHIPPED | OUTPATIENT
Start: 2024-07-29 | End: 2024-08-03

## 2024-07-31 ENCOUNTER — APPOINTMENT (OUTPATIENT)
Dept: SURGERY | Facility: CLINIC | Age: 69
End: 2024-07-31
Payer: COMMERCIAL

## 2024-08-01 ENCOUNTER — APPOINTMENT (OUTPATIENT)
Dept: PAIN MEDICINE | Facility: CLINIC | Age: 69
End: 2024-08-01
Payer: COMMERCIAL

## 2024-08-05 NOTE — PROGRESS NOTES
SUBJECTIVE:  This is 69 y.o.  female with PMH of obesity BMI of 31, anxiety, hypertension, chronic lower back pain on hydrocodone/oxycodone/lorazepam 1 mg QD from PCP who has been treated for Lower back pain and usually responds to left SI joint injection last 1 was on 5/20/2024 who is here for follow-up we did a great with 100% relief after the SI.  Patient received on 6/17/2020 for bilateral L3-5 MBB with 100% relief of pain and improvement in function till she started moving heavy stuff on Sunday 2 days ago and the pain started coming back but she is hoping that it is going to ease back again.  The patient has no incontinence no saddle paresthesia no radiation to lower extremities and no numbness no tingling.  Will plan on bilateral L3-5 MBB under fluoroscopy guidance if the pain does not improve by itself order and consent were done.      Prior office visit:  3/12/2024: This is 68 y.o.  female with PMH of obesity BMI of 31, anxiety, hypertension, chronic lower back pain on hydrocodone from PCP who has been treated for Lower back pain and RT sacroiliitis who responds very well to left SI joint injections who is here for follow-up stating that the SI joint did not help her much last time.  I started going in depth with her exam and her radiological findings and I really do feel that her pain is coming from her facet joint on the left side specially with the tenderness and the pain associated with facet loading during the exam today.  Will plan on left L3-5 MBB or radiofrequency ablation what ever the insurance approves under fluoroscopy guidance as diagnostic and therapeutic treatment specially that she had excellent relief in 2022 from her medial branch block.  The patient gets Percocet from her PCP and we may get her an extra Percocet or her PCP give her extra Percocet for her coming up medication in May.  But for now I think left L3-5 MBB under fluoroscopy guidance is a valuable tool as diagnostic  procedure.              Last procedure:   6/17/2024 Bilateral L3-5 % improvement in pain and function did she start lifting heavy stuff 3 days ago and the pain started coming back  5/20/2024 left SI joint the patient has had a 100% improvement in pain and function  1/15/2024 left SI joint injection patient has had 0% improvement in pain and function  7/24/2023 left SI joint injection the patient has had 80% improvement in pain and function  4/24/2023 left SI joint injection has had a 50% relief for 4 to 5 weeks  11/7/2022 left SI injection the patient has had a 100% improvement in pain and function for almost 2 months  9/19/2022 left SI injection the patient has had a 100% improvement in pain and function and the pain just came back  8/8/2022 left L3-5 MBB the patient has had a 70% improvement in pain and function  Caudal and bilateral L5-S1 interlaminar JULIO C by Lydia the patient has had helped with the legs numbness but never affected the lower back pain which is her major problem     Portions of record reviewed for pertinent issues: active problem list, medication list, allergies, family history, social history, notes from last encounter, encounters, lab results, imaging and other system records.           I have personally reviewed the OARRS report for this patient. This report is scanned into the electronic medical record. I have considered the risks of abuse, dependence, addiction and diversion. It showed Percocet 5 mg once daily and lorazepam 1 mg HS from Heriberto Keller MD   OPIOID RISK ASSESSMENT SCORE 0/26  Aberrant behavior: None        Social history: The patient is retired, she has 3 children and 7 grandchildren, finished few college classes denies smoking drinking or use of illicit drugs     Diagnostic studies:  7/19/2022 cervical x-ray showed mild spondylotic changes with mild degeneration in the lower cervical spine     12/8/2021 EMG of the left leg was within normal limits     11/6/2021 MRI of the lumbar spine  did not show any bone marrow edema or endplate changes, grade 1 spondylosis at the L4-5 but no canal stenosis no foraminal stenosis but significant facet hypertrophy and spondylosis worse at the L4-5 and L5-S1:  FINDINGS:  Mild levoscoliosis noted. Conus medullaris terminates at the level of  the L1 vertebral body. Visualized portions of the conus and the  spinal cord shows normal signal intensity and caliber.     Vertebral body heights are well maintained. Mild heterogeneous bone  marrow signal intensity is normal. Trace anterolisthesis of L4 on L5,  L3 on L4.     At T12-L1 trace retrolisthesis of T12 on L1, diffuse disc bulge, mild  facet joint arthropathy in combination noted causing mild central  spinal canal stenosis and minimal encroachment of the bilateral  foramina.     At L1-L2 mild disc bulge, facet joint arthropathy in combination  noted causing mild central spinal canal stenosis, and mild bilateral  neural foramina narrowing.     At L2-L3 mild disc bulge, facet joint arthropathy, ligamentum flavum  hypertrophy noted causing minimal encroachment of the spinal canal  and bilateral neural foramina.     At L3-L4 trace anterolisthesis of L3 on L4, minimal disc bulge,  significant facet joint arthropathy, mild ligamentum flavum  hypertrophy, no significant central spinal canal or neural foramina  stenosis.     At L4-L5 grade 1 anterolisthesis of L4 on L5, significant facet joint  arthropathy noted causing mild encroachment of the spinal canal and  right neural foramina.     At L5-S1 mild facet joint arthropathy noted, mild disc bulge, without  any significant central spinal canal or neural foramina stenosis.     IMPRESSION:  There is mild degenerative spinal canal or neural foramina stenosis  at T12-L1, L1-L2, L2-L3.           Review of Systems   HENT: Negative.     Eyes: Negative.    Respiratory: Negative.     Cardiovascular: Negative.    Gastrointestinal: Negative.    Endocrine: Negative.    Genitourinary:  Negative.    Musculoskeletal:  Positive for back pain and myalgias.   Skin: Negative.    Neurological: Negative.    Hematological: Negative.    Psychiatric/Behavioral: Negative.           Physical Exam  Vitals and nursing note reviewed.   Constitutional:       Appearance: Normal appearance.   HENT:      Head: Normocephalic and atraumatic.      Nose: Nose normal.   Eyes:      Extraocular Movements: Extraocular movements intact.      Conjunctiva/sclera: Conjunctivae normal.      Pupils: Pupils are equal, round, and reactive to light.   Cardiovascular:      Rate and Rhythm: Normal rate and regular rhythm.      Pulses: Normal pulses.      Heart sounds: Normal heart sounds.   Pulmonary:      Effort: Pulmonary effort is normal.      Breath sounds: Normal breath sounds.   Abdominal:      General: Abdomen is flat. Bowel sounds are normal.      Palpations: Abdomen is soft.   Musculoskeletal:         General: Tenderness present.      Comments: Increased pain with facet loading lateral bending and extension   Skin:     General: Skin is warm.   Neurological:      General: No focal deficit present.      Mental Status: She is alert and oriented to person, place, and time.   Psychiatric:         Mood and Affect: Mood normal.         Behavior: Behavior normal.               Plan  At least 50% of the visit was involved in the discussion of the options for treatment. We discussed exercises, medication, interventional therapies and surgery. Healthy life style is essential with patient hard work to achieve the wellness. In addition; discussion with the patient and/or family about any of the diagnostic results, impressions and/or recommended diagnostic studies, prognosis, risks and benefits of treatment options, instructions for treatment and/or follow-up, importance of compliance with chosen treatment options, risk-factor reduction, and patient/family education.         Pool therapy, walking in the pool, at least 3x per week for 30  minutes  Continue self-directed physical therapy  Repeat bilateral L3-5 MBB under fluoroscopy guidance when patient calls  Healthy lifestyle and anti-inflammatory diet in addition to weight control discussed with the patient  Alternative chronic pain therapies was discussed, encouraged and information was handed  Return to Clinic 2 to 3 months     *Please note this report has been produced using speech recognition software and may contain errors related to that system including grammar, punctuation and spelling as well as words and phrases that may be inappropriate. If there are questions or concerns, please feel free to contact me to clarify.    Stacey Flaherty MD

## 2024-08-06 ENCOUNTER — APPOINTMENT (OUTPATIENT)
Dept: PAIN MEDICINE | Facility: CLINIC | Age: 69
End: 2024-08-06
Payer: COMMERCIAL

## 2024-08-06 VITALS
WEIGHT: 178 LBS | OXYGEN SATURATION: 97 % | BODY MASS INDEX: 29.66 KG/M2 | RESPIRATION RATE: 18 BRPM | TEMPERATURE: 96.3 F | HEART RATE: 75 BPM | SYSTOLIC BLOOD PRESSURE: 119 MMHG | HEIGHT: 65 IN | DIASTOLIC BLOOD PRESSURE: 76 MMHG

## 2024-08-06 DIAGNOSIS — M47.817 LUMBOSACRAL SPONDYLOSIS WITHOUT MYELOPATHY: Primary | ICD-10-CM

## 2024-08-06 PROCEDURE — 3074F SYST BP LT 130 MM HG: CPT | Performed by: ANESTHESIOLOGY

## 2024-08-06 PROCEDURE — 3078F DIAST BP <80 MM HG: CPT | Performed by: ANESTHESIOLOGY

## 2024-08-06 PROCEDURE — 1125F AMNT PAIN NOTED PAIN PRSNT: CPT | Performed by: ANESTHESIOLOGY

## 2024-08-06 PROCEDURE — 1159F MED LIST DOCD IN RCRD: CPT | Performed by: ANESTHESIOLOGY

## 2024-08-06 PROCEDURE — 99214 OFFICE O/P EST MOD 30 MIN: CPT | Performed by: ANESTHESIOLOGY

## 2024-08-06 PROCEDURE — 3008F BODY MASS INDEX DOCD: CPT | Performed by: ANESTHESIOLOGY

## 2024-08-06 RX ORDER — LORAZEPAM 2 MG/1
TABLET ORAL
Qty: 1 TABLET | Refills: 0 | Status: SHIPPED | OUTPATIENT
Start: 2024-08-06

## 2024-08-06 ASSESSMENT — ENCOUNTER SYMPTOMS
LOSS OF SENSATION IN FEET: 0
DEPRESSION: 0
OCCASIONAL FEELINGS OF UNSTEADINESS: 0

## 2024-08-06 ASSESSMENT — PAIN SCALES - GENERAL
PAINLEVEL: 7
PAINLEVEL_OUTOF10: 7

## 2024-08-06 ASSESSMENT — PAIN - FUNCTIONAL ASSESSMENT: PAIN_FUNCTIONAL_ASSESSMENT: 0-10

## 2024-08-06 ASSESSMENT — PAIN DESCRIPTION - DESCRIPTORS: DESCRIPTORS: ACHING

## 2024-08-06 NOTE — PROGRESS NOTES
This is 69 y.o.  female with who has been treated for Lower back pain. Pain was better 100% better until this past weekend patient was moving tables and thinks she might of injured her back again. The pain is described as achiness and is relieved by Sitting and Lying down with who is here for follow-up.   Chief Complaint   Patient presents with    Follow-up     6/17/2024 L3-5 medial branch block under fluoroscopy guidance            Pain Therapies: Injections

## 2024-08-07 ENCOUNTER — OFFICE VISIT (OUTPATIENT)
Dept: PRIMARY CARE | Facility: CLINIC | Age: 69
End: 2024-08-07
Payer: COMMERCIAL

## 2024-08-07 VITALS
TEMPERATURE: 98.1 F | HEIGHT: 66 IN | BODY MASS INDEX: 28.93 KG/M2 | SYSTOLIC BLOOD PRESSURE: 116 MMHG | OXYGEN SATURATION: 97 % | HEART RATE: 88 BPM | DIASTOLIC BLOOD PRESSURE: 74 MMHG | WEIGHT: 180 LBS

## 2024-08-07 DIAGNOSIS — J06.9 VIRAL URI: Primary | ICD-10-CM

## 2024-08-07 PROCEDURE — 3008F BODY MASS INDEX DOCD: CPT | Performed by: INTERNAL MEDICINE

## 2024-08-07 PROCEDURE — 3078F DIAST BP <80 MM HG: CPT | Performed by: INTERNAL MEDICINE

## 2024-08-07 PROCEDURE — 90471 IMMUNIZATION ADMIN: CPT | Performed by: INTERNAL MEDICINE

## 2024-08-07 PROCEDURE — 1159F MED LIST DOCD IN RCRD: CPT | Performed by: INTERNAL MEDICINE

## 2024-08-07 PROCEDURE — 90677 PCV20 VACCINE IM: CPT | Performed by: INTERNAL MEDICINE

## 2024-08-07 PROCEDURE — 1036F TOBACCO NON-USER: CPT | Performed by: INTERNAL MEDICINE

## 2024-08-07 PROCEDURE — 3074F SYST BP LT 130 MM HG: CPT | Performed by: INTERNAL MEDICINE

## 2024-08-07 PROCEDURE — 99213 OFFICE O/P EST LOW 20 MIN: CPT | Performed by: INTERNAL MEDICINE

## 2024-08-07 NOTE — PROGRESS NOTES
"PCP: Heriberto Keller MD   I have reviewed existing histories, notes, past medical history, surgical history, social history, family history, med list, allergy list, and immunization, and updated.    HPI:   Since Sunday has had pressure nose, bright yellow mucus. Has gotten better. Mucus is no longer bright yellow. No Fever and chills   No sob or wheezing    Lab Results   Component Value Date    WBC 6.5 07/10/2023    HGB 13.4 07/10/2023    HCT 43.4 07/10/2023     07/10/2023    CHOL 214 (H) 11/02/2023    TRIG 148 11/02/2023    HDL 43.6 11/02/2023    ALT 16 07/10/2023    AST 17 07/10/2023     07/10/2023    K 4.7 07/10/2023     07/10/2023    CREATININE 1.06 (H) 07/10/2023    BUN 15 07/10/2023    CO2 29 07/10/2023    TSH 2.22 07/10/2023    HGBA1C 5.7 (H) 11/02/2023     Physical exam:  /74   Pulse 88   Temp 36.7 °C (98.1 °F)   Ht 1.676 m (5' 6\")   Wt 81.6 kg (180 lb)   LMP  (LMP Unknown)   SpO2 97%   BMI 29.05 kg/m²    TM normal  No Neck gland swelling  Heart exam showed normal heart sounds, no murmur, clicks, gallops or rubs. Regular rate and rhythm. Chest is clear; no wheezes or rales.      Assessments/orders:   1. Viral URI        Robitussin DM  Please get labs done soon  Prevnar 20             "

## 2024-08-12 ENCOUNTER — APPOINTMENT (OUTPATIENT)
Dept: ALLERGY | Facility: CLINIC | Age: 69
End: 2024-08-12
Payer: COMMERCIAL

## 2024-08-20 ENCOUNTER — PATIENT MESSAGE (OUTPATIENT)
Dept: PRIMARY CARE | Facility: CLINIC | Age: 69
End: 2024-08-20
Payer: COMMERCIAL

## 2024-08-20 DIAGNOSIS — M54.50 CHRONIC BILATERAL LOW BACK PAIN, UNSPECIFIED WHETHER SCIATICA PRESENT: ICD-10-CM

## 2024-08-20 DIAGNOSIS — G89.29 CHRONIC LOW BACK PAIN WITH SCIATICA, SCIATICA LATERALITY UNSPECIFIED, UNSPECIFIED BACK PAIN LATERALITY: Primary | ICD-10-CM

## 2024-08-20 DIAGNOSIS — M54.40 CHRONIC LOW BACK PAIN WITH SCIATICA, SCIATICA LATERALITY UNSPECIFIED, UNSPECIFIED BACK PAIN LATERALITY: Primary | ICD-10-CM

## 2024-08-20 DIAGNOSIS — J30.1 ALLERGIC RHINITIS DUE TO POLLEN: ICD-10-CM

## 2024-08-20 DIAGNOSIS — G89.29 CHRONIC BILATERAL LOW BACK PAIN, UNSPECIFIED WHETHER SCIATICA PRESENT: ICD-10-CM

## 2024-08-20 RX ORDER — HYDROCODONE BITARTRATE AND ACETAMINOPHEN 5; 325 MG/1; MG/1
1 TABLET ORAL EVERY 6 HOURS PRN
Qty: 30 TABLET | Refills: 0 | Status: SHIPPED | OUTPATIENT
Start: 2024-08-20 | End: 2024-08-30

## 2024-08-21 ENCOUNTER — OFFICE VISIT (OUTPATIENT)
Dept: SURGERY | Facility: CLINIC | Age: 69
End: 2024-08-21
Payer: COMMERCIAL

## 2024-08-21 VITALS
DIASTOLIC BLOOD PRESSURE: 84 MMHG | HEART RATE: 87 BPM | WEIGHT: 181 LBS | SYSTOLIC BLOOD PRESSURE: 125 MMHG | BODY MASS INDEX: 29.21 KG/M2 | TEMPERATURE: 98 F

## 2024-08-21 DIAGNOSIS — D23.4 CYST, DERMOID, SCALP AND NECK: Primary | ICD-10-CM

## 2024-08-21 PROCEDURE — 1125F AMNT PAIN NOTED PAIN PRSNT: CPT | Performed by: SURGERY

## 2024-08-21 PROCEDURE — 99213 OFFICE O/P EST LOW 20 MIN: CPT | Performed by: SURGERY

## 2024-08-21 PROCEDURE — 3074F SYST BP LT 130 MM HG: CPT | Performed by: SURGERY

## 2024-08-21 PROCEDURE — 3079F DIAST BP 80-89 MM HG: CPT | Performed by: SURGERY

## 2024-08-21 PROCEDURE — 1036F TOBACCO NON-USER: CPT | Performed by: SURGERY

## 2024-08-21 PROCEDURE — 99203 OFFICE O/P NEW LOW 30 MIN: CPT | Performed by: SURGERY

## 2024-08-21 PROCEDURE — 1159F MED LIST DOCD IN RCRD: CPT | Performed by: SURGERY

## 2024-08-21 RX ORDER — FLUTICASONE PROPIONATE 50 MCG
2 SPRAY, SUSPENSION (ML) NASAL DAILY
Qty: 48 ML | Refills: 3 | Status: SHIPPED | OUTPATIENT
Start: 2024-08-21

## 2024-08-21 RX ORDER — SODIUM CHLORIDE, SODIUM LACTATE, POTASSIUM CHLORIDE, CALCIUM CHLORIDE 600; 310; 30; 20 MG/100ML; MG/100ML; MG/100ML; MG/100ML
20 INJECTION, SOLUTION INTRAVENOUS CONTINUOUS
OUTPATIENT
Start: 2024-08-21

## 2024-08-21 ASSESSMENT — PAIN SCALES - GENERAL: PAINLEVEL: 8

## 2024-08-21 ASSESSMENT — ENCOUNTER SYMPTOMS: DEPRESSION: 0

## 2024-08-21 NOTE — LETTER
"August 21, 2024     Heriberto Keller MD  [address]  [place] [address]    Patient: Heydi Torres   YOB: 1955   Date of Visit: 8/21/2024       Dear Dr. Heriberto Keller MD:    Thank you for referring Heydi Torres to me for evaluation. Below are my notes for this consultation.  If you have questions, please do not hesitate to call me. I look forward to following your patient along with you.       Sincerely,     Doug Franklin MD      CC: No Recipients  ______________________________________________________________________________________    Subjective   Patient ID: Ac Torres \"Heydi\" is a 69 y.o. female who presents for Cyst.  HPI  Patient is a 69-year-old female is referred for evaluation and treatment of a epidermoid inclusion cyst involving her posterior neck.  For many years she noted a pea-sized lesion but for the past year she has had painful swelling that was worse roughly 1 month ago.  She was treated with antibiotics and swelling is much better at this point.    Review of Systems  On review of systems patient denies any weight loss, fever, change in bowel habits, melena, hematochezia, coronary artery disease, hypertension, TIA/CVA, bleeding, jaundice, pancreatitis, hepatitis.    Patient does not smoke. Patient does not drink alcohol.    Past Medical History:   Diagnosis Date   • Personal history of other endocrine, nutritional and metabolic disease 07/01/2022    History of hyperlipidemia   • Personal history of other medical treatment 01/19/2021    History of screening mammography        Past Surgical History:   Procedure Laterality Date   • KNEE Right    • OTHER SURGICAL HISTORY  03/08/2021    Laparoscopy   • ROTATOR CUFF REPAIR Bilateral    • ULNAR NERVE REPAIR          Objective     Physical Exam    Well-nourished, well-developed. In no distress  Alert and oriented x 3  Lungs are clear to auscultation bilaterally.  Cardiac exam is regular rhythm " and rate.  Abdomen is soft nontender nondistended.  Neurologic exam is without focal deficit.   In the posterior neck just above the hairline and to the right of midline there is a subcutaneous mass measuring roughly 1 cm.  Minimal tenderness    Assessment/Plan   Diagnoses and all orders for this visit:  Cyst, dermoid, scalp and neck  -     Referral to General Surgery    Impression;   Posterior neck epidermoid cyst.  Recommend excisional biopsy in the operating room.  We discussed the procedure to include risk, benefits and alternatives.  She agrees to the operation

## 2024-08-21 NOTE — PROGRESS NOTES
"Subjective   Patient ID: Ac Torres \"Heydi\" is a 69 y.o. female who presents for Cyst.  HPI  Patient is a 69-year-old female is referred for evaluation and treatment of a epidermoid inclusion cyst involving her posterior neck.  For many years she noted a pea-sized lesion but for the past year she has had painful swelling that was worse roughly 1 month ago.  She was treated with antibiotics and swelling is much better at this point.    Review of Systems  On review of systems patient denies any weight loss, fever, change in bowel habits, melena, hematochezia, coronary artery disease, hypertension, TIA/CVA, bleeding, jaundice, pancreatitis, hepatitis.    Patient does not smoke. Patient does not drink alcohol.    Past Medical History:   Diagnosis Date    Personal history of other endocrine, nutritional and metabolic disease 07/01/2022    History of hyperlipidemia    Personal history of other medical treatment 01/19/2021    History of screening mammography        Past Surgical History:   Procedure Laterality Date    KNEE Right     OTHER SURGICAL HISTORY  03/08/2021    Laparoscopy    ROTATOR CUFF REPAIR Bilateral     ULNAR NERVE REPAIR          Objective     Physical Exam    Well-nourished, well-developed. In no distress  Alert and oriented x 3  Lungs are clear to auscultation bilaterally.  Cardiac exam is regular rhythm and rate.  Abdomen is soft nontender nondistended.  Neurologic exam is without focal deficit.   In the posterior neck just above the hairline and to the right of midline there is a subcutaneous mass measuring roughly 1 cm.  Minimal tenderness    Assessment/Plan   Diagnoses and all orders for this visit:  Cyst, dermoid, scalp and neck  -     Referral to General Surgery    Impression;   Posterior neck epidermoid cyst.  Recommend excisional biopsy in the operating room.  We discussed the procedure to include risk, benefits and alternatives.  She agrees to the operation       "

## 2024-08-21 NOTE — LETTER
"August 21, 2024     Heriberto Keller MD  [address]  [place] [address]    Patient: Heydi Torres   YOB: 1955   Date of Visit: 8/21/2024       Dear Dr. Heriberto Keller MD:    Thank you for referring Heydi Torres to me for evaluation. Below are my notes for this consultation.  If you have questions, please do not hesitate to call me. I look forward to following your patient along with you.       Sincerely,     Doug Franklin MD      CC: No Recipients  ______________________________________________________________________________________    Subjective   Patient ID: Ac Torres \"Heydi\" is a 69 y.o. female who presents for Cyst.  HPI  Patient is a 69-year-old female is referred for evaluation and treatment of a epidermoid inclusion cyst involving her posterior neck.  For many years she noted a pea-sized lesion but for the past year she has had painful swelling that was worse roughly 1 month ago.  She was treated with antibiotics and swelling is much better at this point.    Review of Systems  On review of systems patient denies any weight loss, fever, change in bowel habits, melena, hematochezia, coronary artery disease, hypertension, TIA/CVA, bleeding, jaundice, pancreatitis, hepatitis.    Patient does not smoke. Patient does not drink alcohol.    Past Medical History:   Diagnosis Date   • Personal history of other endocrine, nutritional and metabolic disease 07/01/2022    History of hyperlipidemia   • Personal history of other medical treatment 01/19/2021    History of screening mammography        Past Surgical History:   Procedure Laterality Date   • KNEE Right    • OTHER SURGICAL HISTORY  03/08/2021    Laparoscopy   • ROTATOR CUFF REPAIR Bilateral    • ULNAR NERVE REPAIR          Objective     Physical Exam    Well-nourished, well-developed. In no distress  Alert and oriented x 3  Lungs are clear to auscultation bilaterally.  Cardiac exam is regular rhythm " and rate.  Abdomen is soft nontender nondistended.  Neurologic exam is without focal deficit.   In the posterior neck just above the hairline and to the right of midline there is a subcutaneous mass measuring roughly 1 cm.  Minimal tenderness    Assessment/Plan   Diagnoses and all orders for this visit:  Cyst, dermoid, scalp and neck  -     Referral to General Surgery    Impression;   Posterior neck epidermoid cyst.  Recommend excisional biopsy in the operating room.  We discussed the procedure to include risk, benefits and alternatives.  She agrees to the operation

## 2024-08-27 PROBLEM — D23.4 CYST, DERMOID, SCALP AND NECK: Status: ACTIVE | Noted: 2024-08-21

## 2024-08-30 ENCOUNTER — TELEPHONE (OUTPATIENT)
Dept: PAIN MEDICINE | Facility: CLINIC | Age: 69
End: 2024-08-30
Payer: COMMERCIAL

## 2024-09-09 ENCOUNTER — PATIENT MESSAGE (OUTPATIENT)
Dept: PRIMARY CARE | Facility: CLINIC | Age: 69
End: 2024-09-09
Payer: COMMERCIAL

## 2024-09-09 DIAGNOSIS — M54.50 LOW BACK PAIN, UNSPECIFIED BACK PAIN LATERALITY, UNSPECIFIED CHRONICITY, UNSPECIFIED WHETHER SCIATICA PRESENT: Primary | ICD-10-CM

## 2024-09-10 RX ORDER — HYDROCODONE BITARTRATE AND ACETAMINOPHEN 5; 325 MG/1; MG/1
1 TABLET ORAL EVERY 6 HOURS PRN
Qty: 30 TABLET | Refills: 0 | Status: SHIPPED | OUTPATIENT
Start: 2024-09-10 | End: 2024-09-17

## 2024-09-19 DIAGNOSIS — K21.9 GASTRO-ESOPHAGEAL REFLUX DISEASE WITHOUT ESOPHAGITIS: ICD-10-CM

## 2024-09-19 RX ORDER — OMEPRAZOLE 20 MG/1
20 CAPSULE, DELAYED RELEASE ORAL DAILY
Qty: 90 CAPSULE | Refills: 3 | Status: SHIPPED | OUTPATIENT
Start: 2024-09-19

## 2024-09-23 ENCOUNTER — APPOINTMENT (OUTPATIENT)
Dept: ALLERGY | Facility: CLINIC | Age: 69
End: 2024-09-23
Payer: COMMERCIAL

## 2024-09-23 VITALS
BODY MASS INDEX: 29.7 KG/M2 | TEMPERATURE: 97.4 F | SYSTOLIC BLOOD PRESSURE: 114 MMHG | DIASTOLIC BLOOD PRESSURE: 77 MMHG | HEIGHT: 66 IN | WEIGHT: 184.8 LBS | HEART RATE: 72 BPM | OXYGEN SATURATION: 94 %

## 2024-09-23 DIAGNOSIS — L50.8 CHRONIC URTICARIA: Primary | ICD-10-CM

## 2024-09-23 DIAGNOSIS — J30.1 SEASONAL ALLERGIC RHINITIS DUE TO POLLEN: ICD-10-CM

## 2024-09-23 PROCEDURE — 99213 OFFICE O/P EST LOW 20 MIN: CPT | Performed by: ALLERGY & IMMUNOLOGY

## 2024-09-23 PROCEDURE — 3074F SYST BP LT 130 MM HG: CPT | Performed by: ALLERGY & IMMUNOLOGY

## 2024-09-23 PROCEDURE — 1126F AMNT PAIN NOTED NONE PRSNT: CPT | Performed by: ALLERGY & IMMUNOLOGY

## 2024-09-23 PROCEDURE — 1159F MED LIST DOCD IN RCRD: CPT | Performed by: ALLERGY & IMMUNOLOGY

## 2024-09-23 PROCEDURE — 3008F BODY MASS INDEX DOCD: CPT | Performed by: ALLERGY & IMMUNOLOGY

## 2024-09-23 PROCEDURE — 1036F TOBACCO NON-USER: CPT | Performed by: ALLERGY & IMMUNOLOGY

## 2024-09-23 PROCEDURE — 3078F DIAST BP <80 MM HG: CPT | Performed by: ALLERGY & IMMUNOLOGY

## 2024-09-23 ASSESSMENT — ENCOUNTER SYMPTOMS
CONSTITUTIONAL NEGATIVE: 1
CARDIOVASCULAR NEGATIVE: 1
GASTROINTESTINAL NEGATIVE: 1
HEMATOLOGIC/LYMPHATIC NEGATIVE: 1
BACK PAIN: 1
EYES NEGATIVE: 1
ALLERGIC/IMMUNOLOGIC NEGATIVE: 1
RESPIRATORY NEGATIVE: 1

## 2024-09-23 ASSESSMENT — PAIN SCALES - GENERAL: PAINLEVEL: 0-NO PAIN

## 2024-09-23 NOTE — H&P (VIEW-ONLY)
"Ac Torres presents for follow up evaluation today for chronic urticaria and allergic rhinitis.  She was last seen in 2/2024.      She provide the following history:    She reports that she had a URI and bronchitis in May 2024, then URI and sinus infection again in July 2024 after visiting her family in [place].  She is required 4 courses of azithromycin between May and July.  She notes that since that time she has not had any further URI symptoms.  She continues to use Flonase and levocetirizine.  She has not had any urticaria.  She denies any nosebleeds on Flonase.  Though Nasonex was not covered by her insurance.  She notes that wherever her purse rubs her arm, she has redness but no itching.  She has had to use occasional naproxen for back pain however it did not trigger hives.    Review of Systems   Constitutional: Negative.    Eyes: Negative.    Respiratory: Negative.     Cardiovascular: Negative.    Gastrointestinal: Negative.    Musculoskeletal:  Positive for back pain.   Skin: Negative.    Allergic/Immunologic: Negative.    Hematological: Negative.        Vital signs:  /77   Pulse 72   Temp 36.3 °C (97.4 °F)   Ht 1.676 m (5' 6\")   Wt 83.8 kg (184 lb 12.8 oz)   LMP  (LMP Unknown)   SpO2 94%   BMI 29.83 kg/m²       GENERAL: Alert, oriented and in no acute distress.     HEENT: EYES: No conjunctival injection or cobblestoning. Nose: nasal turbinates are not edematous and are not boggy.  There is no mucous stranding, polyps, or blood    noted. EARS: Tympanic membranes are clear. MOUTH: moist and pink with no exudates, ulcers, or thrush. NECK: is supple, without adenopathy.  No upper airway stridor noted.       HEART: regular rate and rhythm.       LUNGS: Clear to auscultation bilaterally. No wheezing, rhonchi or rales.        ABDOMEN: Positive bowel sounds, soft, nontender, nondistended.       EXTREMITIES: No clubbing or edema.        NEURO:  Normal affect.  Gait normal.      SKIN: No rash, " hives, or angioedema noted    Impression:   1. Chronic urticaria    2. Seasonal allergic rhinitis due to pollen         Assessment and plan:  Chronic Urticaria: Doing well on current therapy. Continue levocetirizine 5 mg once to twice daily. Continue to avoid NSAIDs and limit use of narcotic pain medications which can trigger hives. She is up-to-date with all age-appropriate cancer screening. Total IgE, serum tryptase, CU index, TSH, antithyroid antibodies were within normal limits.      Allergic rhinitis, seasonal: Environmental allergens specific IgE panel positive for tree, grass, weed, mold.  She has been able to tolerate Flonase since last visit without nose bleeds. Nasonex was not covered by insurance.  May use antihistamines as above for rhinitis symptoms.     Plan for follow up in 6 months or sooner if needed

## 2024-09-23 NOTE — PATIENT INSTRUCTIONS
Continue levocetirizine (Xyzal) 5 mg once to twice daily    Continue Flonase (fluticasone) 2 sprays each nostril once daily    Our nurse line phone number is [phone] if you have any concerns     We would like to see you in follow up in 6 months or sooner if needed

## 2024-09-24 ENCOUNTER — HOSPITAL ENCOUNTER (OUTPATIENT)
Dept: PAIN MEDICINE | Facility: CLINIC | Age: 69
Discharge: HOME | End: 2024-09-24
Payer: COMMERCIAL

## 2024-09-24 VITALS
DIASTOLIC BLOOD PRESSURE: 80 MMHG | RESPIRATION RATE: 16 BRPM | OXYGEN SATURATION: 98 % | SYSTOLIC BLOOD PRESSURE: 130 MMHG | HEART RATE: 77 BPM | TEMPERATURE: 97 F

## 2024-09-24 DIAGNOSIS — M47.817 LUMBOSACRAL SPONDYLOSIS WITHOUT MYELOPATHY: ICD-10-CM

## 2024-09-24 PROCEDURE — 64493 INJ PARAVERT F JNT L/S 1 LEV: CPT | Mod: 50 | Performed by: ANESTHESIOLOGY

## 2024-09-24 PROCEDURE — 64494 INJ PARAVERT F JNT L/S 2 LEV: CPT | Mod: 50 | Performed by: ANESTHESIOLOGY

## 2024-09-24 PROCEDURE — 2500000004 HC RX 250 GENERAL PHARMACY W/ HCPCS (ALT 636 FOR OP/ED): Performed by: ANESTHESIOLOGY

## 2024-09-24 PROCEDURE — 2500000005 HC RX 250 GENERAL PHARMACY W/O HCPCS: Performed by: ANESTHESIOLOGY

## 2024-09-24 PROCEDURE — 64494 INJ PARAVERT F JNT L/S 2 LEV: CPT | Performed by: ANESTHESIOLOGY

## 2024-09-24 PROCEDURE — 64493 INJ PARAVERT F JNT L/S 1 LEV: CPT | Performed by: ANESTHESIOLOGY

## 2024-09-24 RX ORDER — TRIAMCINOLONE ACETONIDE 40 MG/ML
INJECTION, SUSPENSION INTRA-ARTICULAR; INTRAMUSCULAR AS NEEDED
Status: COMPLETED | OUTPATIENT
Start: 2024-09-24 | End: 2024-09-24

## 2024-09-24 RX ORDER — LIDOCAINE HYDROCHLORIDE 5 MG/ML
INJECTION, SOLUTION INFILTRATION; INTRAVENOUS AS NEEDED
Status: COMPLETED | OUTPATIENT
Start: 2024-09-24 | End: 2024-09-24

## 2024-09-24 RX ORDER — BUPIVACAINE HYDROCHLORIDE 7.5 MG/ML
INJECTION, SOLUTION EPIDURAL; RETROBULBAR AS NEEDED
Status: COMPLETED | OUTPATIENT
Start: 2024-09-24 | End: 2024-09-24

## 2024-09-24 ASSESSMENT — ENCOUNTER SYMPTOMS
DEPRESSION: 0
OCCASIONAL FEELINGS OF UNSTEADINESS: 0
LOSS OF SENSATION IN FEET: 0

## 2024-09-24 ASSESSMENT — PAIN - FUNCTIONAL ASSESSMENT: PAIN_FUNCTIONAL_ASSESSMENT: 0-10

## 2024-09-24 ASSESSMENT — PAIN DESCRIPTION - DESCRIPTORS: DESCRIPTORS: THROBBING

## 2024-09-24 ASSESSMENT — PAIN SCALES - GENERAL
PAINLEVEL_OUTOF10: 7
PAINLEVEL_OUTOF10: 8

## 2024-09-24 NOTE — Clinical Note
Patient in room , Patient identified . Universal protocal , and preprocedure check list ,patient postioned , site prepped . Mid to lower back.

## 2024-10-01 ENCOUNTER — TELEPHONE (OUTPATIENT)
Dept: SURGERY | Facility: CLINIC | Age: 69
End: 2024-10-01
Payer: COMMERCIAL

## 2024-10-01 NOTE — TELEPHONE ENCOUNTER
Return call to patient to inform her that no pre-admission testing was required for her upcoming surgery on 10/8/2024 with Dr. Franklin.Surgery team will call the day before surgery with arrival  time and day of surgery instruction. Location of surgery give as well ([facility]).

## 2024-10-02 ENCOUNTER — LAB (OUTPATIENT)
Dept: LAB | Facility: LAB | Age: 69
End: 2024-10-02
Payer: COMMERCIAL

## 2024-10-02 DIAGNOSIS — I12.9 HYPERTENSIVE KIDNEY DISEASE WITH STAGE 3A CHRONIC KIDNEY DISEASE (MULTI): ICD-10-CM

## 2024-10-02 DIAGNOSIS — R73.03 PREDIABETES: ICD-10-CM

## 2024-10-02 DIAGNOSIS — Z00.00 PHYSICAL EXAM: ICD-10-CM

## 2024-10-02 DIAGNOSIS — N18.31 HYPERTENSIVE KIDNEY DISEASE WITH STAGE 3A CHRONIC KIDNEY DISEASE (MULTI): ICD-10-CM

## 2024-10-02 DIAGNOSIS — E55.9 VITAMIN D DEFICIENCY: ICD-10-CM

## 2024-10-02 DIAGNOSIS — E78.00 PURE HYPERCHOLESTEROLEMIA: ICD-10-CM

## 2024-10-02 LAB
25(OH)D3 SERPL-MCNC: 30 NG/ML (ref 30–100)
ALBUMIN SERPL BCP-MCNC: 3.8 G/DL (ref 3.4–5)
ALP SERPL-CCNC: 68 U/L (ref 33–136)
ALT SERPL W P-5'-P-CCNC: 16 U/L (ref 7–45)
ANION GAP SERPL CALC-SCNC: 12 MMOL/L (ref 10–20)
AST SERPL W P-5'-P-CCNC: 13 U/L (ref 9–39)
BILIRUB SERPL-MCNC: 0.5 MG/DL (ref 0–1.2)
BUN SERPL-MCNC: 18 MG/DL (ref 6–23)
CALCIUM SERPL-MCNC: 9.3 MG/DL (ref 8.6–10.6)
CHLORIDE SERPL-SCNC: 103 MMOL/L (ref 98–107)
CHOLEST SERPL-MCNC: 238 MG/DL (ref 0–199)
CHOLESTEROL/HDL RATIO: 4.4
CO2 SERPL-SCNC: 29 MMOL/L (ref 21–32)
CREAT SERPL-MCNC: 0.97 MG/DL (ref 0.5–1.05)
CREAT UR-MCNC: 140.5 MG/DL (ref 20–320)
EGFRCR SERPLBLD CKD-EPI 2021: 64 ML/MIN/1.73M*2
ERYTHROCYTE [DISTWIDTH] IN BLOOD BY AUTOMATED COUNT: 14.6 % (ref 11.5–14.5)
EST. AVERAGE GLUCOSE BLD GHB EST-MCNC: 123 MG/DL
GLUCOSE SERPL-MCNC: 100 MG/DL (ref 74–99)
HBA1C MFR BLD: 5.9 %
HCT VFR BLD AUTO: 42.1 % (ref 36–46)
HDLC SERPL-MCNC: 54.4 MG/DL
HGB BLD-MCNC: 13.4 G/DL (ref 12–16)
LDLC SERPL CALC-MCNC: 154 MG/DL
MCH RBC QN AUTO: 27.7 PG (ref 26–34)
MCHC RBC AUTO-ENTMCNC: 31.8 G/DL (ref 32–36)
MCV RBC AUTO: 87 FL (ref 80–100)
MICROALBUMIN UR-MCNC: <7 MG/L
MICROALBUMIN/CREAT UR: NORMAL MG/G{CREAT}
NON HDL CHOLESTEROL: 184 MG/DL (ref 0–149)
NRBC BLD-RTO: 0 /100 WBCS (ref 0–0)
PLATELET # BLD AUTO: 378 X10*3/UL (ref 150–450)
POTASSIUM SERPL-SCNC: 4.5 MMOL/L (ref 3.5–5.3)
PROT SERPL-MCNC: 7.1 G/DL (ref 6.4–8.2)
RBC # BLD AUTO: 4.84 X10*6/UL (ref 4–5.2)
SODIUM SERPL-SCNC: 139 MMOL/L (ref 136–145)
TRIGL SERPL-MCNC: 147 MG/DL (ref 0–149)
VLDL: 29 MG/DL (ref 0–40)
WBC # BLD AUTO: 10.4 X10*3/UL (ref 4.4–11.3)

## 2024-10-02 PROCEDURE — 82570 ASSAY OF URINE CREATININE: CPT

## 2024-10-02 PROCEDURE — 83036 HEMOGLOBIN GLYCOSYLATED A1C: CPT

## 2024-10-02 PROCEDURE — 85027 COMPLETE CBC AUTOMATED: CPT

## 2024-10-02 PROCEDURE — 82043 UR ALBUMIN QUANTITATIVE: CPT

## 2024-10-02 PROCEDURE — 80053 COMPREHEN METABOLIC PANEL: CPT

## 2024-10-02 PROCEDURE — 82306 VITAMIN D 25 HYDROXY: CPT

## 2024-10-02 PROCEDURE — 36415 COLL VENOUS BLD VENIPUNCTURE: CPT

## 2024-10-02 PROCEDURE — 80061 LIPID PANEL: CPT

## 2024-10-07 ENCOUNTER — ANESTHESIA EVENT (OUTPATIENT)
Dept: OPERATING ROOM | Facility: HOSPITAL | Age: 69
End: 2024-10-07
Payer: COMMERCIAL

## 2024-10-08 ENCOUNTER — HOSPITAL ENCOUNTER (OUTPATIENT)
Facility: HOSPITAL | Age: 69
Setting detail: OUTPATIENT SURGERY
Discharge: HOME | End: 2024-10-08
Attending: SURGERY | Admitting: SURGERY
Payer: COMMERCIAL

## 2024-10-08 ENCOUNTER — ANESTHESIA (OUTPATIENT)
Dept: OPERATING ROOM | Facility: HOSPITAL | Age: 69
End: 2024-10-08
Payer: COMMERCIAL

## 2024-10-08 VITALS
DIASTOLIC BLOOD PRESSURE: 58 MMHG | RESPIRATION RATE: 18 BRPM | TEMPERATURE: 97.2 F | HEIGHT: 66 IN | OXYGEN SATURATION: 97 % | BODY MASS INDEX: 29.62 KG/M2 | SYSTOLIC BLOOD PRESSURE: 118 MMHG | WEIGHT: 184.3 LBS | HEART RATE: 56 BPM

## 2024-10-08 DIAGNOSIS — D23.4 CYST, DERMOID, SCALP AND NECK: Primary | ICD-10-CM

## 2024-10-08 PROBLEM — I25.10 CAD (CORONARY ARTERY DISEASE): Status: ACTIVE | Noted: 2024-10-08

## 2024-10-08 PROCEDURE — 7100000010 HC PHASE TWO TIME - EACH INCREMENTAL 1 MINUTE: Performed by: SURGERY

## 2024-10-08 PROCEDURE — 3600000003 HC OR TIME - INITIAL BASE CHARGE - PROCEDURE LEVEL THREE: Performed by: SURGERY

## 2024-10-08 PROCEDURE — 3700000001 HC GENERAL ANESTHESIA TIME - INITIAL BASE CHARGE: Performed by: SURGERY

## 2024-10-08 PROCEDURE — 2500000004 HC RX 250 GENERAL PHARMACY W/ HCPCS (ALT 636 FOR OP/ED)

## 2024-10-08 PROCEDURE — 0752T DGTZ GLS MCRSCP SLD LVL III: CPT | Mod: TC,AHULAB | Performed by: SURGERY

## 2024-10-08 PROCEDURE — 2500000005 HC RX 250 GENERAL PHARMACY W/O HCPCS

## 2024-10-08 PROCEDURE — 7100000001 HC RECOVERY ROOM TIME - INITIAL BASE CHARGE: Performed by: SURGERY

## 2024-10-08 PROCEDURE — 2500000001 HC RX 250 WO HCPCS SELF ADMINISTERED DRUGS (ALT 637 FOR MEDICARE OP): Performed by: ANESTHESIOLOGY

## 2024-10-08 PROCEDURE — 2720000007 HC OR 272 NO HCPCS: Performed by: SURGERY

## 2024-10-08 PROCEDURE — 7100000009 HC PHASE TWO TIME - INITIAL BASE CHARGE: Performed by: SURGERY

## 2024-10-08 PROCEDURE — 11422 EXC H-F-NK-SP B9+MARG 1.1-2: CPT | Performed by: SURGERY

## 2024-10-08 PROCEDURE — 7100000002 HC RECOVERY ROOM TIME - EACH INCREMENTAL 1 MINUTE: Performed by: SURGERY

## 2024-10-08 PROCEDURE — 3600000008 HC OR TIME - EACH INCREMENTAL 1 MINUTE - PROCEDURE LEVEL THREE: Performed by: SURGERY

## 2024-10-08 PROCEDURE — 2500000005 HC RX 250 GENERAL PHARMACY W/O HCPCS: Performed by: ANESTHESIOLOGY

## 2024-10-08 PROCEDURE — 2500000004 HC RX 250 GENERAL PHARMACY W/ HCPCS (ALT 636 FOR OP/ED): Performed by: SURGERY

## 2024-10-08 PROCEDURE — 3700000002 HC GENERAL ANESTHESIA TIME - EACH INCREMENTAL 1 MINUTE: Performed by: SURGERY

## 2024-10-08 RX ORDER — SODIUM CHLORIDE, SODIUM LACTATE, POTASSIUM CHLORIDE, CALCIUM CHLORIDE 600; 310; 30; 20 MG/100ML; MG/100ML; MG/100ML; MG/100ML
20 INJECTION, SOLUTION INTRAVENOUS CONTINUOUS
Status: DISCONTINUED | OUTPATIENT
Start: 2024-10-08 | End: 2024-10-08 | Stop reason: HOSPADM

## 2024-10-08 RX ORDER — MIDAZOLAM HYDROCHLORIDE 1 MG/ML
INJECTION INTRAMUSCULAR; INTRAVENOUS AS NEEDED
Status: DISCONTINUED | OUTPATIENT
Start: 2024-10-08 | End: 2024-10-08

## 2024-10-08 RX ORDER — ONDANSETRON HYDROCHLORIDE 2 MG/ML
4 INJECTION, SOLUTION INTRAVENOUS ONCE AS NEEDED
Status: DISCONTINUED | OUTPATIENT
Start: 2024-10-08 | End: 2024-10-08 | Stop reason: HOSPADM

## 2024-10-08 RX ORDER — OXYCODONE HYDROCHLORIDE 5 MG/1
5 TABLET ORAL EVERY 6 HOURS PRN
Qty: 8 TABLET | Refills: 0 | Status: SHIPPED | OUTPATIENT
Start: 2024-10-08

## 2024-10-08 RX ORDER — PROPOFOL 10 MG/ML
INJECTION, EMULSION INTRAVENOUS AS NEEDED
Status: DISCONTINUED | OUTPATIENT
Start: 2024-10-08 | End: 2024-10-08

## 2024-10-08 RX ORDER — CEFAZOLIN 1 G/1
INJECTION, POWDER, FOR SOLUTION INTRAVENOUS AS NEEDED
Status: DISCONTINUED | OUTPATIENT
Start: 2024-10-08 | End: 2024-10-08

## 2024-10-08 RX ORDER — OXYCODONE HYDROCHLORIDE 5 MG/1
5 TABLET ORAL EVERY 4 HOURS PRN
Status: DISCONTINUED | OUTPATIENT
Start: 2024-10-08 | End: 2024-10-08 | Stop reason: HOSPADM

## 2024-10-08 RX ORDER — METOPROLOL SUCCINATE 25 MG/1
25 TABLET, EXTENDED RELEASE ORAL DAILY
COMMUNITY

## 2024-10-08 RX ORDER — ONDANSETRON HYDROCHLORIDE 2 MG/ML
INJECTION, SOLUTION INTRAVENOUS AS NEEDED
Status: DISCONTINUED | OUTPATIENT
Start: 2024-10-08 | End: 2024-10-08

## 2024-10-08 RX ORDER — FENTANYL CITRATE 50 UG/ML
INJECTION, SOLUTION INTRAMUSCULAR; INTRAVENOUS AS NEEDED
Status: DISCONTINUED | OUTPATIENT
Start: 2024-10-08 | End: 2024-10-08

## 2024-10-08 RX ORDER — LIDOCAINE HYDROCHLORIDE 10 MG/ML
0.1 INJECTION, SOLUTION EPIDURAL; INFILTRATION; INTRACAUDAL; PERINEURAL ONCE
Status: DISCONTINUED | OUTPATIENT
Start: 2024-10-08 | End: 2024-10-08 | Stop reason: HOSPADM

## 2024-10-08 RX ORDER — MEPERIDINE HYDROCHLORIDE 25 MG/ML
12.5 INJECTION INTRAMUSCULAR; INTRAVENOUS; SUBCUTANEOUS EVERY 10 MIN PRN
Status: DISCONTINUED | OUTPATIENT
Start: 2024-10-08 | End: 2024-10-08 | Stop reason: HOSPADM

## 2024-10-08 RX ORDER — SODIUM CHLORIDE, SODIUM LACTATE, POTASSIUM CHLORIDE, CALCIUM CHLORIDE 600; 310; 30; 20 MG/100ML; MG/100ML; MG/100ML; MG/100ML
100 INJECTION, SOLUTION INTRAVENOUS CONTINUOUS
Status: DISCONTINUED | OUTPATIENT
Start: 2024-10-08 | End: 2024-10-08 | Stop reason: HOSPADM

## 2024-10-08 RX ORDER — LIDOCAINE HYDROCHLORIDE 20 MG/ML
INJECTION, SOLUTION EPIDURAL; INFILTRATION; INTRACAUDAL; PERINEURAL AS NEEDED
Status: DISCONTINUED | OUTPATIENT
Start: 2024-10-08 | End: 2024-10-08

## 2024-10-08 ASSESSMENT — PAIN SCALES - GENERAL
PAINLEVEL_OUTOF10: 5 - MODERATE PAIN
PAINLEVEL_OUTOF10: 3
PAINLEVEL_OUTOF10: 3
PAIN_LEVEL: 0
PAINLEVEL_OUTOF10: 3
PAINLEVEL_OUTOF10: 0 - NO PAIN
PAINLEVEL_OUTOF10: 0 - NO PAIN
PAINLEVEL_OUTOF10: 3

## 2024-10-08 ASSESSMENT — PAIN - FUNCTIONAL ASSESSMENT
PAIN_FUNCTIONAL_ASSESSMENT: 0-10

## 2024-10-08 NOTE — OP NOTE
"Excision Lesion Neck (R) Operative Note     Date: 10/8/2024  OR Location: [facility] A OR    Name: Ac Torres \"Heydi\", : 1955, Age: 69 y.o., MRN: [id number], Sex: female    Diagnosis  Pre-op Diagnosis      * Cyst, dermoid, scalp and neck [D23.4] Post-op Diagnosis     * Cyst, dermoid, scalp and neck [D23.4]     Procedures  Excision Lesion Neck  01199 - [place] EXC B9 LESION MRGN XCP SK TG S/N/H/F/G 1.1-2.0CM      Surgeons      * Doug Franklin - Primary    Resident/Fellow/Other Assistant:  Surgeons and Role:  * No surgeons found with a matching role *    Procedure Summary  Anesthesia: General  ASA: III  Anesthesia Staff: Anesthesiologist: Ruperto Aparicio MD  CRNA: JET Ron-CRNA  Estimated Blood Loss: 3 mL  Intra-op Medications: Administrations occurring from 1230 to 1330 on 10/08/24:  * No intraprocedure medications in log *           Anesthesia Record               Intraprocedure I/O Totals       None           Specimen:   ID Type Source Tests Collected by Time   1 : POSTERIOR NECK CYST Tissue CYST NECK RIGHT SURGICAL PATHOLOGY EXAM Doug Franklin MD 10/8/2024 1426        Staff:   Circulator: Chey Dupont Person: Marina Kernsub Person: Angy  Scrub Person: Angelina            Findings: 1.5 cm epidermoid cyst    Indications: Heydi Torres is an 69 y.o. female who is having surgery for Cyst, dermoid, scalp and neck [D23.4].     The patient was seen in the preoperative area. The risks, benefits, complications, treatment options, non-operative alternatives, expected recovery and outcomes were discussed with the patient. The possibilities of reaction to medication, pulmonary aspiration, injury to surrounding structures, bleeding, recurrent infection, the need for additional procedures, failure to diagnose a condition, and creating a complication requiring transfusion or operation were discussed with the patient. The patient concurred with the proposed plan, giving informed consent.  The site of surgery was " properly noted/marked if necessary per policy. The patient has been actively warmed in preoperative area. Preoperative antibiotics have been ordered and given within 1 hours of incision. Venous thrombosis prophylaxis have been ordered including bilateral sequential compression devices     Procedure Details: Patient is a 69-year-old female is referred for evaluation and treatment of a epidermoid inclusion cyst involving her posterior neck. For many years she noted a pea-sized lesion but for the past year she has had painful swelling that was worse roughly 1 month ago. She was treated with antibiotics and swelling is much better at this point.     She comes in for excisional biopsy of this lesion.  Risk benefits alternatives were discussed preoperatively and she agrees to the operation.    Description of procedure:  Patient taken operating room, placed supine on the operating table.  Timeout was established, general anesthesia was induced.  She was given IV antibiotics.  She was repositioned in the decubitus position on a  beanbag with the right side up.    The lump was noted in the right posterior lateral scalp just above the hairline.  This area was sterilely prepared.  We infiltrated with solution 1% lidocaine, quarter percent Marcaine.  We made a transverse incision and readily encountered a well circumscribed cyst, it was excised and handed off the field as a specimen.  It measured 1.5 cm in greatest dimension.  We irrigated the wound and then after ensuring hemostasis we closed the incision using 4-0 subdermal Vicryl stitches followed by Dermabond glue.  Patient tolerated procedure without difficulty and was returned to recovery room in stable condition        Complications:  None; patient tolerated the procedure well.    Disposition: PACU - hemodynamically stable.  Condition: stable           Attending Attestation: I performed the procedure.    Doug Franklin  Phone Number: [phone]

## 2024-10-08 NOTE — NURSING NOTE
1441: Patient to bay with anesthesia and surgical team present. Handoff report and plan of care reviewed, all questions answered. VSS.    1442: Patient removed from supplemental oxygen at this time    1445: Patient tolerating sips of ginger ale and pretzels at this time    1449: Family updated via text message    1501: 5mg oxycodone administered per given orders, allergies verified prior to administration    1529: Patient meets phase one discharge criteria at this time    1530: Patient to phase two at this time, handoff given to CALIN Strange

## 2024-10-08 NOTE — H&P
Patient is a 69-year-old female is referred for evaluation and treatment of a epidermoid inclusion cyst involving her posterior neck.  For many years she noted a pea-sized lesion but for the past year she has had painful swelling that was worse roughly 1 month ago.  She was treated with antibiotics and swelling is much better at this point.     Review of Systems  On review of systems patient denies any weight loss, fever, change in bowel habits, melena, hematochezia, coronary artery disease, hypertension, TIA/CVA, bleeding, jaundice, pancreatitis, hepatitis.     Patient does not smoke. Patient does not drink alcohol.     Medical History        Past Medical History:   Diagnosis Date    Personal history of other endocrine, nutritional and metabolic disease 07/01/2022     History of hyperlipidemia    Personal history of other medical treatment 01/19/2021     History of screening mammography            Surgical History         Past Surgical History:   Procedure Laterality Date    KNEE Right      OTHER SURGICAL HISTORY   03/08/2021     Laparoscopy    ROTATOR CUFF REPAIR Bilateral      ULNAR NERVE REPAIR                      Objective  Physical Exam     Well-nourished, well-developed. In no distress  Alert and oriented x 3  Lungs are clear to auscultation bilaterally.  Cardiac exam is regular rhythm and rate.  Abdomen is soft nontender nondistended.  Neurologic exam is without focal deficit.   In the posterior neck just above the hairline and to the right of midline there is a subcutaneous mass measuring roughly 1 cm.  Minimal tenderness           Assessment/Plan  Diagnoses and all orders for this visit:  Cyst, dermoid, scalp and neck  -     Referral to General Surgery     Impression;   Posterior neck epidermoid cyst.  Recommend excisional biopsy in the operating room.  We discussed the procedure to include risk, benefits and alternatives.  She agrees to the operation

## 2024-10-08 NOTE — ANESTHESIA POSTPROCEDURE EVALUATION
"Patient: Ac Torres \"Heydi\"    Procedure Summary       Date: 10/08/24 Room / Location: [facility] OR 05 / [facility]    Anesthesia Start: 1401 Anesthesia Stop: 1442    Procedure: Excision Lesion Neck (Right: Neck) Diagnosis:       Cyst, dermoid, scalp and neck      (Cyst, dermoid, scalp and neck [D23.4])    Surgeons: Doug Franklin MD Responsible Provider: Ruperto Aparicio MD    Anesthesia Type: general ASA Status: 3            Anesthesia Type: general    Vitals Value Taken Time   /58 10/08/24 1528   Temp 36.3 °C (97.3 °F) 10/08/24 1441   Pulse 56 10/08/24 1529   Resp 14 10/08/24 1515   SpO2 97 % 10/08/24 1529   Vitals shown include unfiled device data.    Anesthesia Post Evaluation    Patient location during evaluation: bedside  Patient participation: complete - patient cannot participate  Level of consciousness: awake  Pain score: 0  Pain management: adequate  Airway patency: patent  Cardiovascular status: acceptable and hemodynamically stable  Respiratory status: acceptable and nonlabored ventilation  Hydration status: acceptable  Postoperative Nausea and Vomiting: none        No notable events documented.    "

## 2024-10-08 NOTE — ANESTHESIA PREPROCEDURE EVALUATION
"Patient: Ac Torres \"Heydi\"    Procedure Information       Date/Time: 10/08/24 1230    Procedure: Excision Lesion Neck (Neck)    Location: [facility] OR 05 / Virtual [facility] OR    Surgeons: Doug Franklin MD            Relevant Problems   Anesthesia (within normal limits)      Cardiac   (+) Abnormal EKG   (+) Atypical chest pain   (+) CAD (coronary artery disease)   (+) HTN (hypertension)   (+) Hyperlipemia   (+) Hyperlipidemia   (+) Hypertension   (+) Paroxysmal SVT (supraventricular tachycardia) (CMS-HCC)   (+) Paroxysmal supraventricular tachycardia (CMS-HCC) (Ablation for SVT 1990s)      Pulmonary  Bronchitis hx  Env allergies   (+) SOTO (dyspnea on exertion)   (+) Dyspnea on exertion      Neuro   (+) Anxiety disorder   (+) Cervical radiculopathy   (+) Generalized anxiety disorder   (+) Lumbosacral neuritis   (+) Sciatica   (+) Ulnar neuropathy      GI   (+) GERD without esophagitis   (+) Gastroesophageal reflux disease without esophagitis      /Renal (within normal limits)      Liver (within normal limits)      Endocrine   (+) Hyperthyroidism   (+) Obesity, unspecified      Hematology (within normal limits)      Musculoskeletal   (+) Bulging lumbar disc   (+) Cervical spondylosis without myelopathy   (+) Chronic low back pain   (+) Chronic pain syndrome   (+) Disorder of intervertebral disc of lumbar spine   (+) Lumbar spondylosis   (+) Multilevel degenerative disc disease   (+) Osseous stenosis of neural canal of cervical region   (+) Osteoarthritis of acromioclavicular joint   (+) Primary osteoarthritis of both knees   (+) Primary osteoarthritis of right knee   (+) Spinal stenosis of cervical region   (+) Spondylosis of lumbar spine      Skin   (+) Eczema       Clinical information reviewed:    Allergies      OB Status           NPO Detail:  NPO/Void Status  Date of Last Liquid: 10/08/24  Time of Last Liquid: 0830  Date of Last Solid: 10/07/24  Time of Last Solid: 2100         Physical " Exam    Airway  Mallampati: II     Cardiovascular    Dental    Pulmonary    Abdominal            Anesthesia Plan    History of general anesthesia?: yes  History of complications of general anesthesia?: no    ASA 3     general     intravenous induction   Anesthetic plan and risks discussed with patient.

## 2024-10-08 NOTE — ANESTHESIA PROCEDURE NOTES
Airway  Date/Time: 10/8/2024 2:13 PM  Urgency: elective    Airway not difficult    Staffing  Performed: SRNA   Authorized by: Ruperto Aparicio MD    Performed by: Ayesha Fabiola  Patient location during procedure: OR    Indications and Patient Condition  Indications for airway management: anesthesia  Spontaneous ventilation: present  Sedation level: deep  Preoxygenated: yes  Patient position: sniffing  Mask difficulty assessment: 0 - not attempted  Planned trial extubation    Final Airway Details  Final airway type: supraglottic airway (i gel 4)      Successful airway: Supraglottic airway: i gel 4.  Size 4     Number of attempts at approach: 1

## 2024-10-09 ASSESSMENT — PAIN SCALES - GENERAL: PAINLEVEL_OUTOF10: 4

## 2024-10-14 LAB
LABORATORY COMMENT REPORT: NORMAL
PATH REPORT.FINAL DX SPEC: NORMAL
PATH REPORT.GROSS SPEC: NORMAL
PATH REPORT.RELEVANT HX SPEC: NORMAL
PATH REPORT.TOTAL CANCER: NORMAL

## 2024-10-15 ENCOUNTER — APPOINTMENT (OUTPATIENT)
Dept: CARDIOLOGY | Facility: CLINIC | Age: 69
End: 2024-10-15
Payer: COMMERCIAL

## 2024-10-23 ENCOUNTER — OFFICE VISIT (OUTPATIENT)
Dept: CARDIOLOGY | Facility: CLINIC | Age: 69
End: 2024-10-23
Payer: COMMERCIAL

## 2024-10-23 VITALS
OXYGEN SATURATION: 98 % | HEIGHT: 66 IN | SYSTOLIC BLOOD PRESSURE: 128 MMHG | BODY MASS INDEX: 29.73 KG/M2 | RESPIRATION RATE: 18 BRPM | WEIGHT: 185 LBS | DIASTOLIC BLOOD PRESSURE: 72 MMHG | HEART RATE: 73 BPM

## 2024-10-23 DIAGNOSIS — R00.2 PALPITATIONS: ICD-10-CM

## 2024-10-23 DIAGNOSIS — R94.31 ABNORMAL EKG: ICD-10-CM

## 2024-10-23 DIAGNOSIS — R07.89 ATYPICAL CHEST PAIN: ICD-10-CM

## 2024-10-23 DIAGNOSIS — Z86.79 S/P RF ABLATION OPERATION FOR ARRHYTHMIA: ICD-10-CM

## 2024-10-23 DIAGNOSIS — I47.10 PAROXYSMAL SVT (SUPRAVENTRICULAR TACHYCARDIA) (CMS-HCC): ICD-10-CM

## 2024-10-23 DIAGNOSIS — Z98.890 S/P RF ABLATION OPERATION FOR ARRHYTHMIA: ICD-10-CM

## 2024-10-23 DIAGNOSIS — E78.00 PURE HYPERCHOLESTEROLEMIA: ICD-10-CM

## 2024-10-23 DIAGNOSIS — I10 PRIMARY HYPERTENSION: Primary | ICD-10-CM

## 2024-10-23 PROCEDURE — 3074F SYST BP LT 130 MM HG: CPT | Performed by: STUDENT IN AN ORGANIZED HEALTH CARE EDUCATION/TRAINING PROGRAM

## 2024-10-23 PROCEDURE — 1159F MED LIST DOCD IN RCRD: CPT | Performed by: STUDENT IN AN ORGANIZED HEALTH CARE EDUCATION/TRAINING PROGRAM

## 2024-10-23 PROCEDURE — 99214 OFFICE O/P EST MOD 30 MIN: CPT | Performed by: STUDENT IN AN ORGANIZED HEALTH CARE EDUCATION/TRAINING PROGRAM

## 2024-10-23 PROCEDURE — 3078F DIAST BP <80 MM HG: CPT | Performed by: STUDENT IN AN ORGANIZED HEALTH CARE EDUCATION/TRAINING PROGRAM

## 2024-10-23 PROCEDURE — G2211 COMPLEX E/M VISIT ADD ON: HCPCS | Performed by: STUDENT IN AN ORGANIZED HEALTH CARE EDUCATION/TRAINING PROGRAM

## 2024-10-23 PROCEDURE — 3008F BODY MASS INDEX DOCD: CPT | Performed by: STUDENT IN AN ORGANIZED HEALTH CARE EDUCATION/TRAINING PROGRAM

## 2024-10-23 PROCEDURE — 1036F TOBACCO NON-USER: CPT | Performed by: STUDENT IN AN ORGANIZED HEALTH CARE EDUCATION/TRAINING PROGRAM

## 2024-10-23 PROCEDURE — 1126F AMNT PAIN NOTED NONE PRSNT: CPT | Performed by: STUDENT IN AN ORGANIZED HEALTH CARE EDUCATION/TRAINING PROGRAM

## 2024-10-23 ASSESSMENT — PATIENT HEALTH QUESTIONNAIRE - PHQ9
2. FEELING DOWN, DEPRESSED OR HOPELESS: NOT AT ALL
SUM OF ALL RESPONSES TO PHQ9 QUESTIONS 1 AND 2: 0
1. LITTLE INTEREST OR PLEASURE IN DOING THINGS: NOT AT ALL

## 2024-10-23 ASSESSMENT — ENCOUNTER SYMPTOMS
DEPRESSION: 0
OCCASIONAL FEELINGS OF UNSTEADINESS: 0
LOSS OF SENSATION IN FEET: 0

## 2024-10-23 ASSESSMENT — PAIN SCALES - GENERAL: PAINLEVEL_OUTOF10: 0-NO PAIN

## 2024-10-23 NOTE — PATIENT INSTRUCTIONS
Please continue current cardiac medications including hydrochlorothiazide 25 mg daily, Toprol succinate 25 mg daily, pravastatin 40 mg daily.  Refills were sent to your pharmacy.    We will obtain a transthoracic echocardiogram for structural evaluation including ejection fraction, assessment of regional wall motion abnormalities or valvular disease, and further evaluation of hemodynamics.    Please followup with me in Cardiology clinic within the next 10 months.  Please return to clinic sooner or seek emergent care if your symptoms reoccur or worsen.

## 2024-10-23 NOTE — PROGRESS NOTES
"Follow-up     HPI:    Ac Torres \"Conchis" is a 69 y.o. female with pertinent history of asthma, family history of premature coronary artery disease, bronchitis, lumbar disc disease, hypertension, hyperlipidemia, palpitations, history of paroxysmal supraventricular tachycardia status post ablation [facility], preserved ejection fraction with impaired relaxation echo performed 1/9/2023, concern for mild anterolateral ischemia nuclear stress test performed 1/10/2023, mild nonobstructive coronary artery disease with normal LVEDP of 10 mmHg on cardiac catheterization performed 1/23/2023, a single 4 beat episode of SVT on event monitor performed December 2022 presents to cardiology clinic for follow-up.     She is doing relatively well.  Chest discomfort is largely resolved.  She did not need to take sublingual nitroglycerin.  She did have recent surgery on her scalp and is healing well.  She has minimal palpitations.  She is a bit concerned about some hair loss Nasa daily medications including metoprolol would cause this.  We did discuss the benefits and risks of metoprolol.  We did review her prior cardiac catheterization and echocardiogram.   Dyspnea on exertion is stable.  No exacerbating or relieving factors.   Pt denies orthopnea, and paroxysmal nocturnal dyspnea.  Pt denies worsening lower extremity edema.  Pt denies palpitations or syncope.  No recent falls.  No fever or chills.  No cough.  No change in bowel or bladder habits.  No sick contacts.  No recent travel.    12 point review of systems including (Constitutional, Eyes, ENMT, Respiratory, Cardiac, Gastrointestinal, Neurological, Psychiatric, and Hematologic) was performed and is otherwise negative.    Past medical history reviewed:   has a past medical history of GERD (gastroesophageal reflux disease), HLD (hyperlipidemia), Palpitations, Personal history of other endocrine, nutritional and metabolic disease (07/01/2022), and Personal history " of other medical treatment (01/19/2021).    Past surgical history reviewed:   has a past surgical history that includes Other surgical history (03/08/2021); Rotator cuff repair (Bilateral); Ulnar nerve repair; and XR knee (Right).    Social history reviewed:   reports that she has never smoked. She has never used smokeless tobacco. She reports that she does not currently use alcohol. She reports that she does not use drugs.     Family history reviewed:    Family History   Problem Relation Name Age of Onset    Heart disease Mother      Hypertension Mother      COPD Mother      Kidney cancer Father      Hypertension Father      Heart attack Father      Lung cancer Sister      Diabetes Sister      Hypertension Sister      Heart disease Maternal Grandmother      Hypertension Maternal Grandmother         Allergies reviewed: Amoxicillin-pot clavulanate, Atorvastatin, Latex, Rosuvastatin, Topiramate, Meloxicam, Nortriptyline, and Pseudoephedrine hcl     Medications reviewed:   Current Outpatient Medications   Medication Instructions    albuterol 90 mcg/actuation inhaler 1-2 puffs, inhalation, Every 4-6 hours as needed    calcium acetate 668 mg (169 mg calcium) tablet oral, Daily    cholecalciferol (VITAMIN D-3) 1,000 Units, oral, Daily    clindamycin (Cleocin T) 1 % lotion     clobetasol (Temovate) 0.05 % cream Topical, 2 times daily    desonide (DesOwen) 0.05 % ointment     fluticasone (Flonase) 50 mcg/actuation nasal spray 2 sprays, Each Nostril, Daily    hydroCHLOROthiazide (HYDRODIURIL) 25 mg, oral, Daily    levocetirizine (XYZAL) 5 mg, oral, Daily    LORazepam (Ativan) 2 mg tablet Take 1 p.o. 1 hour before procedure    LORazepam (Ativan) 2 mg tablet Take 1 p.o. 1 hour before the procedure    LORazepam (Ativan) 2 mg tablet Take 1 p.o. 1 hour before the procedure    LORazepam (ATIVAN) 1 mg, oral, Daily PRN    metoprolol succinate XL (TOPROL-XL) 25 mg, oral, Daily, Do not crush or chew.    naloxone (NARCAN) 4 mg, nasal,  As needed, In one nostril and call 911     nitroglycerin (NITROSTAT) 0.4 mg, sublingual, Every 5 min PRN, May repeat dose every 5 minutes for up to 3 doses total.    omeprazole (PRILOSEC) 20 mg, oral, Daily    oxyCODONE (ROXICODONE) 5 mg, oral, Every 6 hours PRN    pravastatin (PRAVACHOL) 40 mg, oral, Nightly    triamcinolone (Kenalog) 0.1 % ointment APPLY TO ITCHY HIVES ON BODY, MONDAY THRU FRIDAY. AVOID FACE.          Vitals reviewed: Visit Vitals  /72 (BP Location: Left arm, Patient Position: Sitting, BP Cuff Size: Adult long)   Pulse 73   Resp 18       Physical Exam:   General:  Patient is awake, alert, and oriented.  Patient is in no acute distress.  HEENT:  Pupils equal and reactive.  Normocephalic.  Moist mucosa.    Neck:  No thyromegaly.  Normal Jugular Venous Pressure.  Cardiovascular:  Regular rate and rhythm.  Normal S1 and S2.  1/6 KHADRA.  Pulmonary:  Clear to auscultation bilaterally.  Abdomen:  Soft. Non-tender.   Non-distended.  Positive bowel sounds.  Lower Extremities:  2+ pedal pulses. No LE edema.  Neurologic:  Cranial nerves intact.  No focal deficit.   Skin: Skin warm and dry, normal skin turgor.   Psychiatric: Normal affect.    Last Labs:  CBC -      Lab Results   Component Value Date    WBC 10.4 10/02/2024    HGB 13.4 10/02/2024    HCT 42.1 10/02/2024     10/02/2024        CMP-  Lab Results   Component Value Date    GLUCOSE 100 (H) 10/02/2024     10/02/2024    K 4.5 10/02/2024     10/02/2024    CO2 29 10/02/2024    ANIONGAP 12 10/02/2024    BUN 18 10/02/2024    CREATININE 0.97 10/02/2024    EGFR 64 10/02/2024    CALCIUM 9.3 10/02/2024    PROT 7.1 10/02/2024    ALBUMIN 3.8 10/02/2024    AST 13 10/02/2024    ALT 16 10/02/2024    ALKPHOS 68 10/02/2024    BILITOT 0.5 10/02/2024        LIPIDS-  Lab Results   Component Value Date    CHOL 238 (H) 10/02/2024    TRIG 147 10/02/2024    HDL 54.4 10/02/2024    CHHDL 4.4 10/02/2024    VLDL 29 10/02/2024        OTHERS-  Lab Results  "  Component Value Date    HGBA1C 5.9 (H) 10/02/2024    BNP 7 06/02/2023        I personally reviewed the patient's recent vitals, labs, medications, orders, EKGs, pertinent cardiac imaging/ echocardiography and ischemic evaluations including stress testing/ cardiac catheterization.    Assessment and Plan:    Ac Torres \"Conchis" is a 69 y.o. female with pertinent history of asthma, family history of premature coronary artery disease, bronchitis, lumbar disc disease, hypertension, hyperlipidemia, palpitations, history of paroxysmal supraventricular tachycardia status post ablation 1994 [facility], preserved ejection fraction with impaired relaxation echo performed 1/9/2023, concern for mild anterolateral ischemia nuclear stress test performed 1/10/2023, mild nonobstructive coronary artery disease with normal LVEDP of 10 mmHg on cardiac catheterization performed 1/23/2023, a single 4 beat episode of SVT on event monitor performed December 2022 presents to cardiology clinic for follow-up.  She is doing relatively well.  Chest discomfort is largely resolved.  She did not need to take sublingual nitroglycerin.  She did have recent surgery on her scalp and is healing well.  She has minimal palpitations.  She is a bit concerned about some hair loss Nasa daily medications including metoprolol would cause this.  We did discuss the benefits and risks of metoprolol.  We did review her prior cardiac catheterization and echocardiogram.   Dyspnea on exertion is stable.    Please continue current cardiac medications including hydrochlorothiazide 25 mg daily, Toprol succinate 25 mg daily, pravastatin 40 mg daily.  Refills were sent to your pharmacy.    We will obtain a transthoracic echocardiogram for structural evaluation including ejection fraction, assessment of regional wall motion abnormalities or valvular disease, and further evaluation of hemodynamics.    Please followup with me in Cardiology clinic within the next " 10 months.  Please return to clinic sooner or seek emergent care if your symptoms reoccur or worsen.    Thank you for allowing me to participate in their care.  Please feel free to call me with any further questions or concerns.        Reynaldo Montelongo MD, FACC, JONO SMITH  Division of Cardiovascular Medicine  Medical Director, [facility] [place]  Clinical , [employer]  [email or web address]   Office:  [phone]

## 2024-10-25 ENCOUNTER — OFFICE VISIT (OUTPATIENT)
Dept: SURGERY | Facility: CLINIC | Age: 69
End: 2024-10-25
Payer: COMMERCIAL

## 2024-10-25 VITALS
HEART RATE: 66 BPM | BODY MASS INDEX: 29.83 KG/M2 | WEIGHT: 185.6 LBS | SYSTOLIC BLOOD PRESSURE: 132 MMHG | DIASTOLIC BLOOD PRESSURE: 86 MMHG | HEIGHT: 66 IN | TEMPERATURE: 95.4 F

## 2024-10-25 DIAGNOSIS — D23.4 CYST, DERMOID, SCALP AND NECK: Primary | ICD-10-CM

## 2024-10-25 PROCEDURE — 3079F DIAST BP 80-89 MM HG: CPT | Performed by: SURGERY

## 2024-10-25 PROCEDURE — 1125F AMNT PAIN NOTED PAIN PRSNT: CPT | Performed by: SURGERY

## 2024-10-25 PROCEDURE — 1036F TOBACCO NON-USER: CPT | Performed by: SURGERY

## 2024-10-25 PROCEDURE — 1159F MED LIST DOCD IN RCRD: CPT | Performed by: SURGERY

## 2024-10-25 PROCEDURE — 3075F SYST BP GE 130 - 139MM HG: CPT | Performed by: SURGERY

## 2024-10-25 PROCEDURE — 3008F BODY MASS INDEX DOCD: CPT | Performed by: SURGERY

## 2024-10-25 PROCEDURE — 99211 OFF/OP EST MAY X REQ PHY/QHP: CPT | Performed by: SURGERY

## 2024-10-25 ASSESSMENT — ENCOUNTER SYMPTOMS: DEPRESSION: 0

## 2024-10-25 ASSESSMENT — PAIN SCALES - GENERAL: PAINLEVEL_OUTOF10: 5

## 2024-10-25 NOTE — PROGRESS NOTES
Ms. Ac Torres (likes to be called Heydi) is a 69 y.o. year old female who comes in today for follow-up after undergoing excision of neck lesion.  This procedure was performed on 10/8/24.      The patient notes dark red blood the size of 2 quarters on her pillow in the morning. This happens every night. She endorses tenderness around the lesion. She denies fevers, chills, or purulent discharge.     Patient is doing very well and is pleased with the outcome of the surgery.    On exam patient looks well     Surgical pathology revealed epidermal inclusion cyst.     Incisions are healing very nicely but some seepage.      Impression: Doing well following excision of neck lesion.     Discussed increasing activity level    Follow-up with me  in 10 days for wound check

## 2024-10-30 ENCOUNTER — APPOINTMENT (OUTPATIENT)
Dept: PRIMARY CARE | Facility: CLINIC | Age: 69
End: 2024-10-30
Payer: COMMERCIAL

## 2024-10-30 VITALS
WEIGHT: 167.55 LBS | BODY MASS INDEX: 27.92 KG/M2 | HEIGHT: 65 IN | DIASTOLIC BLOOD PRESSURE: 86 MMHG | TEMPERATURE: 95 F | OXYGEN SATURATION: 95 % | HEART RATE: 83 BPM | SYSTOLIC BLOOD PRESSURE: 114 MMHG

## 2024-10-30 DIAGNOSIS — E78.2 MIXED HYPERLIPIDEMIA: ICD-10-CM

## 2024-10-30 DIAGNOSIS — G89.4 CHRONIC PAIN SYNDROME: Chronic | ICD-10-CM

## 2024-10-30 DIAGNOSIS — M54.50 LOW BACK PAIN, UNSPECIFIED BACK PAIN LATERALITY, UNSPECIFIED CHRONICITY, UNSPECIFIED WHETHER SCIATICA PRESENT: Primary | ICD-10-CM

## 2024-10-30 DIAGNOSIS — R73.03 PRE-DIABETES: ICD-10-CM

## 2024-10-30 DIAGNOSIS — I10 PRIMARY HYPERTENSION: ICD-10-CM

## 2024-10-30 PROBLEM — R30.0 DYSURIA: Status: RESOLVED | Noted: 2023-10-04 | Resolved: 2024-10-30

## 2024-10-30 PROBLEM — M99.9 BIOMECHANICAL LESION: Status: RESOLVED | Noted: 2023-01-23 | Resolved: 2024-10-30

## 2024-10-30 PROBLEM — G43.909 HEADACHE, MIGRAINE: Status: RESOLVED | Noted: 2023-03-22 | Resolved: 2024-10-30

## 2024-10-30 PROCEDURE — 1159F MED LIST DOCD IN RCRD: CPT | Performed by: INTERNAL MEDICINE

## 2024-10-30 PROCEDURE — 99213 OFFICE O/P EST LOW 20 MIN: CPT | Performed by: INTERNAL MEDICINE

## 2024-10-30 PROCEDURE — 1036F TOBACCO NON-USER: CPT | Performed by: INTERNAL MEDICINE

## 2024-10-30 PROCEDURE — 3074F SYST BP LT 130 MM HG: CPT | Performed by: INTERNAL MEDICINE

## 2024-10-30 PROCEDURE — G2211 COMPLEX E/M VISIT ADD ON: HCPCS | Performed by: INTERNAL MEDICINE

## 2024-10-30 PROCEDURE — 3079F DIAST BP 80-89 MM HG: CPT | Performed by: INTERNAL MEDICINE

## 2024-10-30 PROCEDURE — 1123F ACP DISCUSS/DSCN MKR DOCD: CPT | Performed by: INTERNAL MEDICINE

## 2024-10-30 PROCEDURE — 3008F BODY MASS INDEX DOCD: CPT | Performed by: INTERNAL MEDICINE

## 2024-10-30 PROCEDURE — 1158F ADVNC CARE PLAN TLK DOCD: CPT | Performed by: INTERNAL MEDICINE

## 2024-10-30 RX ORDER — HYDROCODONE BITARTRATE AND ACETAMINOPHEN 5; 325 MG/1; MG/1
1 TABLET ORAL EVERY 6 HOURS PRN
Qty: 60 TABLET | Refills: 0 | Status: SHIPPED | OUTPATIENT
Start: 2024-10-30

## 2024-10-30 RX ORDER — HYDROCODONE BITARTRATE AND ACETAMINOPHEN 5; 325 MG/1; MG/1
TABLET ORAL
COMMUNITY
End: 2024-10-30 | Stop reason: SDUPTHER

## 2024-11-13 ENCOUNTER — OFFICE VISIT (OUTPATIENT)
Dept: SURGERY | Facility: CLINIC | Age: 69
End: 2024-11-13
Payer: COMMERCIAL

## 2024-11-13 VITALS
TEMPERATURE: 96.8 F | BODY MASS INDEX: 27.79 KG/M2 | SYSTOLIC BLOOD PRESSURE: 121 MMHG | WEIGHT: 167 LBS | HEART RATE: 71 BPM | DIASTOLIC BLOOD PRESSURE: 86 MMHG

## 2024-11-13 DIAGNOSIS — D23.4 CYST, DERMOID, SCALP AND NECK: Primary | ICD-10-CM

## 2024-11-13 PROCEDURE — 1159F MED LIST DOCD IN RCRD: CPT | Performed by: SURGERY

## 2024-11-13 PROCEDURE — 3079F DIAST BP 80-89 MM HG: CPT | Performed by: SURGERY

## 2024-11-13 PROCEDURE — 1123F ACP DISCUSS/DSCN MKR DOCD: CPT | Performed by: SURGERY

## 2024-11-13 PROCEDURE — 3074F SYST BP LT 130 MM HG: CPT | Performed by: SURGERY

## 2024-11-13 PROCEDURE — 1125F AMNT PAIN NOTED PAIN PRSNT: CPT | Performed by: SURGERY

## 2024-11-13 PROCEDURE — 99211 OFF/OP EST MAY X REQ PHY/QHP: CPT | Performed by: SURGERY

## 2024-11-13 ASSESSMENT — ENCOUNTER SYMPTOMS: DEPRESSION: 0

## 2024-11-13 ASSESSMENT — PAIN SCALES - GENERAL: PAINLEVEL_OUTOF10: 2

## 2024-11-13 NOTE — PROGRESS NOTES
Ms. Ac Torres (likes to be called Heydi) is a 69 y.o. year old female who comes in today for follow-up after undergoing excision of neck lesion.  This procedure was performed on 10/8/24.        At her initial postop visit she was noted to have some seepage from her wound.  She is brought back for a wound check    Doing well.  Still notes a very small amount of seepage on her pillow when she wakes up in the morning    On exam the wound is healed nicely.  There is still some glue overlying the wound.  I recommended applying Vaseline's to the wound.    Surgical Pathology Exam: [id number]  Order: [id number]   Collected 10/8/2024 14:26       Status: Final result       Visible to patient: Yes (seen)       Dx: Cyst, dermoid, scalp and neck    0 Result Notes       Component  Resulting Agency   FINAL DIAGNOSIS   A. SKIN, POSTERIOR NECK, EXCISION:  --EPIDERMAL INCLUSION CYST               Nothing to do at this time, no signs of infection that would require antibiotics    Follow-up with me in 1 month for final wound check

## 2024-11-18 ENCOUNTER — PATIENT MESSAGE (OUTPATIENT)
Dept: PRIMARY CARE | Facility: CLINIC | Age: 69
End: 2024-11-18
Payer: COMMERCIAL

## 2024-11-18 DIAGNOSIS — J98.01 BRONCHOSPASM: Primary | ICD-10-CM

## 2024-11-19 RX ORDER — FLUTICASONE PROPIONATE AND SALMETEROL 250; 50 UG/1; UG/1
1 POWDER RESPIRATORY (INHALATION)
Qty: 60 EACH | Refills: 2 | Status: SHIPPED | OUTPATIENT
Start: 2024-11-19 | End: 2025-11-19

## 2024-11-19 NOTE — PROGRESS NOTES
SUBJECTIVE:  This is 69 y.o.  female with PMH of obesity BMI of 31, anxiety, hypertension, chronic lower back pain on Vicodin 5 mg twice daily, Percocet 5 mg, lorazepam 1 mg QD from PCP.  Patient gets great response to bilateral L3-5 MBB with 100% relief initially last injection was on 9/24/2024 correlate with the MRI finding that does not have any major canal or foraminal stenosis but significant facet joint hypertrophy with spondylolisthesis at L4-5 who is here for follow-up the patient had 80% improvement in pain and function after her last bilateral L3-5 MBB.  I explained to her today about the radiofrequency ablation and if her pain comes back we will plan on bilateral L3-5 MB RFA.  And prior to that she had 100% improvement in pain function after last 5/20/2024. er the SI.  Patient received on 6/17/2020 for bilateral L3-5 MBB with 100% relief of pain and improvement in function till she started moving heavy stuff on Sunday 2 days ago and the pain started coming back but she is hoping that it is going to ease back again.  The patient has no incontinence no saddle paresthesia no radiation to lower extremities and no numbness no tingling.  Will plan on bilateral L3-5 MBB under fluoroscopy guidance if the pain does not improve by itself order and consent were done.             Last procedure:   9/24/2024 bilateral L3-5 MBB patient has had 80% improvement in pain and function  6/17/2024 Bilateral L3-5 MBB patient has had 100% improvement in pain and function did she start lifting heavy stuff 3 days ago and the pain started coming back  5/20/2024 left SI joint the patient has had a 100% improvement in pain and function  1/15/2024 left SI joint injection patient has had 0% improvement in pain and function  7/24/2023 left SI joint injection the patient has had 80% improvement in pain and function  4/24/2023 left SI joint injection has had a 50% relief for 4 to 5 weeks  11/7/2022 left SI injection the patient has had a  100% improvement in pain and function for almost 2 months  9/19/2022 left SI injection the patient has had a 100% improvement in pain and function and the pain just came back  8/8/2022 left L3-5 MBB the patient has had a 70% improvement in pain and function  Caudal and bilateral L5-S1 interlaminar JULIO C by Lydia the patient has had helped with the legs numbness but never affected the lower back pain which is her major problem     Portions of record reviewed for pertinent issues: active problem list, medication list, allergies, family history, social history, notes from last encounter, encounters, lab results, imaging and other system records.           I have personally reviewed the OARRS report for this patient. This report is scanned into the electronic medical record. I have considered the risks of abuse, dependence, addiction and diversion. It showed Percocet 5 mg once daily and lorazepam 1 mg HS from Heriberto Keller MD   OPIOID RISK ASSESSMENT SCORE 0/26  Aberrant behavior: None        Social history: The patient is retired, she has 3 children and 7 grandchildren, finished few college classes denies smoking drinking or use of illicit drugs     Diagnostic studies:  7/19/2022 cervical x-ray showed mild spondylotic changes with mild degeneration in the lower cervical spine     12/8/2021 EMG of the left leg was within normal limits     11/6/2021 MRI of the lumbar spine did not show any bone marrow edema or endplate changes, grade 1 spondylosis at the L4-5 but no canal stenosis no foraminal stenosis but significant facet hypertrophy and spondylosis worse at the L4-5 and L5-S1:  FINDINGS:  Mild levoscoliosis noted. Conus medullaris terminates at the level of  the L1 vertebral body. Visualized portions of the conus and the  spinal cord shows normal signal intensity and caliber.     Vertebral body heights are well maintained. Mild heterogeneous bone  marrow signal intensity is normal. Trace anterolisthesis of L4 on L5,  L3  on L4.     At T12-L1 trace retrolisthesis of T12 on L1, diffuse disc bulge, mild  facet joint arthropathy in combination noted causing mild central  spinal canal stenosis and minimal encroachment of the bilateral  foramina.     At L1-L2 mild disc bulge, facet joint arthropathy in combination  noted causing mild central spinal canal stenosis, and mild bilateral  neural foramina narrowing.     At L2-L3 mild disc bulge, facet joint arthropathy, ligamentum flavum  hypertrophy noted causing minimal encroachment of the spinal canal  and bilateral neural foramina.     At L3-L4 trace anterolisthesis of L3 on L4, minimal disc bulge,  significant facet joint arthropathy, mild ligamentum flavum  hypertrophy, no significant central spinal canal or neural foramina  stenosis.     At L4-L5 grade 1 anterolisthesis of L4 on L5, significant facet joint  arthropathy noted causing mild encroachment of the spinal canal and  right neural foramina.     At L5-S1 mild facet joint arthropathy noted, mild disc bulge, without  any significant central spinal canal or neural foramina stenosis.     IMPRESSION:  There is mild degenerative spinal canal or neural foramina stenosis  at T12-L1, L1-L2, L2-L3.          Review of Systems   HENT: Negative.     Eyes: Negative.    Respiratory:  Positive for cough.         Sounded congested   Cardiovascular: Negative.    Gastrointestinal: Negative.    Endocrine: Negative.    Genitourinary: Negative.    Musculoskeletal:  Positive for back pain and myalgias.   Skin: Negative.    Neurological: Negative.    Hematological: Negative.    Psychiatric/Behavioral: Negative.          Physical Exam  Constitutional:       Appearance: Normal appearance.   Neurological:      General: No focal deficit present.      Mental Status: She is alert and oriented to person, place, and time.                      Plan  At least 50% of the visit was involved in the discussion of the options for treatment. We discussed exercises,  medication, interventional therapies and surgery. Healthy life style is essential with patient hard work to achieve the wellness. In addition; discussion with the patient and/or family about any of the diagnostic results, impressions and/or recommended diagnostic studies, prognosis, risks and benefits of treatment options, instructions for treatment and/or follow-up, importance of compliance with chosen treatment options, risk-factor reduction, and patient/family education.     My patient has no underlying substance abuse or alcohol abuse and there's no mental health conditions contributing to the patient's low back pain.        Recommended Pool therapy, walking in the pool, at least 3x per week for 30 minutes  Continue self-directed physical therapy with at least daily exercises for minimum of 20-minute, brochure was handed to the patient  Plan for left SI injection when patient calls after lorazepam  Plan for bilateral L3-5 MB RFA when patient calls after lorazepam  Healthy lifestyle and anti-inflammatory diet in addition to weight control discussed with the patient  Alternative chronic pain therapies was discussed, encouraged and information was handed  Return to Clinic 2 months     *Please note this report has been produced using speech recognition software and may contain errors related to that system including grammar, punctuation and spelling as well as words and phrases that may be inappropriate. If there are questions or concerns, please feel free to contact me to clarify.    tSacey Flaherty MD

## 2024-11-21 ENCOUNTER — PATIENT MESSAGE (OUTPATIENT)
Dept: PRIMARY CARE | Facility: CLINIC | Age: 69
End: 2024-11-21
Payer: COMMERCIAL

## 2024-11-21 DIAGNOSIS — U07.1 COVID-19: Primary | ICD-10-CM

## 2024-11-22 ENCOUNTER — TELEMEDICINE (OUTPATIENT)
Dept: PAIN MEDICINE | Facility: CLINIC | Age: 69
End: 2024-11-22
Payer: COMMERCIAL

## 2024-11-22 DIAGNOSIS — M47.816 SPONDYLOSIS OF LUMBAR SPINE: Primary | ICD-10-CM

## 2024-11-22 PROCEDURE — 1125F AMNT PAIN NOTED PAIN PRSNT: CPT | Performed by: ANESTHESIOLOGY

## 2024-11-22 PROCEDURE — 99214 OFFICE O/P EST MOD 30 MIN: CPT | Performed by: ANESTHESIOLOGY

## 2024-11-22 PROCEDURE — 1123F ACP DISCUSS/DSCN MKR DOCD: CPT | Performed by: ANESTHESIOLOGY

## 2024-11-22 PROCEDURE — 1159F MED LIST DOCD IN RCRD: CPT | Performed by: ANESTHESIOLOGY

## 2024-11-22 PROCEDURE — 1036F TOBACCO NON-USER: CPT | Performed by: ANESTHESIOLOGY

## 2024-11-22 SDOH — ECONOMIC STABILITY: FOOD INSECURITY: WITHIN THE PAST 12 MONTHS, THE FOOD YOU BOUGHT JUST DIDN'T LAST AND YOU DIDN'T HAVE MONEY TO GET MORE.: NEVER TRUE

## 2024-11-22 SDOH — ECONOMIC STABILITY: FOOD INSECURITY: WITHIN THE PAST 12 MONTHS, YOU WORRIED THAT YOUR FOOD WOULD RUN OUT BEFORE YOU GOT MONEY TO BUY MORE.: NEVER TRUE

## 2024-11-22 ASSESSMENT — ENCOUNTER SYMPTOMS
OCCASIONAL FEELINGS OF UNSTEADINESS: 1
LOSS OF SENSATION IN FEET: 0
NEUROLOGICAL NEGATIVE: 1
CARDIOVASCULAR NEGATIVE: 1
EYES NEGATIVE: 1
COUGH: 1
ENDOCRINE NEGATIVE: 1
DEPRESSION: 0
GASTROINTESTINAL NEGATIVE: 1
HEMATOLOGIC/LYMPHATIC NEGATIVE: 1
PSYCHIATRIC NEGATIVE: 1
BACK PAIN: 1
MYALGIAS: 1

## 2024-11-22 ASSESSMENT — PAIN SCALES - GENERAL
PAINLEVEL_OUTOF10: 3
PAINLEVEL_OUTOF10: 3

## 2024-11-22 ASSESSMENT — LIFESTYLE VARIABLES: TOTAL SCORE: 0

## 2024-11-22 ASSESSMENT — PAIN - FUNCTIONAL ASSESSMENT: PAIN_FUNCTIONAL_ASSESSMENT: 0-10

## 2024-11-22 ASSESSMENT — PAIN DESCRIPTION - DESCRIPTORS: DESCRIPTORS: ACHING

## 2024-11-22 NOTE — PROGRESS NOTES
This is 69 y.o.  female with who has been treated for Lumbosacral spondylosis without myelopathy . 9/24/2024 she had bilateral L3, L4, and L5 lumbar medial Branch Block  and has had 80 % pain feeling .Pain is better. Most of her pain is cause when she is carrying her grand babies.  The pain is described as achiness and is relieved by Medications Hydrocodone 5-325 po tid a day .  Here for follow-up   Chief Complaint   Patient presents with    Follow-up       Pain Therapies: Injections        Yahir Each Box that Applies Female Male   FAMILY HISTORY OF SUBSTANCE ABUSE  Yahir the boxes that applies   Alcohol ?  1    ? 3   Illegal drugs ?  2 ? 3   Rx drugs ?  4 ? 4   PERSONAL HISTORY OF SUBSTNACE ABUSE   Alcohol ?  3 ?  3   Illegal drugs ?  4 ?  4    Rx drugs ?  5 ?  5   Age Between 16-45 years ?  1 ?  1   History of Preadolescent Sexual Abuse ?  3 ?  0   PSYCHOLOGIC DISEASE   ADD, OCD, bipolar, schizophrenia ?  2 ?  2   Depression ?  1 ?  1   Scoring Totals 0      Scoring (Risk)  0-3 - Low  4-7 - Moderate  8 - High    Opioid Risk Assessment Score 0/26

## 2024-12-02 DIAGNOSIS — I47.10 PAROXYSMAL SVT (SUPRAVENTRICULAR TACHYCARDIA) (CMS-HCC): Primary | ICD-10-CM

## 2024-12-02 RX ORDER — METOPROLOL SUCCINATE 25 MG/1
25 TABLET, EXTENDED RELEASE ORAL DAILY
Qty: 90 TABLET | Refills: 3 | Status: SHIPPED | OUTPATIENT
Start: 2024-12-02

## 2024-12-09 ENCOUNTER — OFFICE VISIT (OUTPATIENT)
Dept: SURGERY | Facility: CLINIC | Age: 69
End: 2024-12-09
Payer: COMMERCIAL

## 2024-12-09 VITALS
DIASTOLIC BLOOD PRESSURE: 88 MMHG | BODY MASS INDEX: 27.79 KG/M2 | SYSTOLIC BLOOD PRESSURE: 138 MMHG | WEIGHT: 167 LBS | HEART RATE: 73 BPM

## 2024-12-09 DIAGNOSIS — Z09 FOLLOW-UP EXAM: Primary | ICD-10-CM

## 2024-12-09 PROCEDURE — 3079F DIAST BP 80-89 MM HG: CPT | Performed by: SURGERY

## 2024-12-09 PROCEDURE — 99211 OFF/OP EST MAY X REQ PHY/QHP: CPT | Performed by: SURGERY

## 2024-12-09 PROCEDURE — 1123F ACP DISCUSS/DSCN MKR DOCD: CPT | Performed by: SURGERY

## 2024-12-09 PROCEDURE — 1126F AMNT PAIN NOTED NONE PRSNT: CPT | Performed by: SURGERY

## 2024-12-09 PROCEDURE — 1159F MED LIST DOCD IN RCRD: CPT | Performed by: SURGERY

## 2024-12-09 PROCEDURE — 3075F SYST BP GE 130 - 139MM HG: CPT | Performed by: SURGERY

## 2024-12-09 ASSESSMENT — ENCOUNTER SYMPTOMS: DEPRESSION: 0

## 2024-12-09 ASSESSMENT — PAIN SCALES - GENERAL: PAINLEVEL_OUTOF10: 0-NO PAIN

## 2024-12-09 NOTE — PROGRESS NOTES
Comes in today for follow-up after undergoing excisional biopsy of a cyst involving her posterior neck.  This procedure was performed 10/8/2024.  At her initial postop visit she had some seepage but this is stopped at this point    On exam the wound is healed over very nicely.    Follow-up with me as needed is still has some

## 2024-12-10 ENCOUNTER — PATIENT MESSAGE (OUTPATIENT)
Dept: PRIMARY CARE | Facility: CLINIC | Age: 69
End: 2024-12-10
Payer: COMMERCIAL

## 2024-12-10 DIAGNOSIS — H10.31 ACUTE BACTERIAL CONJUNCTIVITIS OF RIGHT EYE: Primary | ICD-10-CM

## 2024-12-11 ENCOUNTER — TELEPHONE (OUTPATIENT)
Dept: PRIMARY CARE | Facility: CLINIC | Age: 69
End: 2024-12-11
Payer: COMMERCIAL

## 2024-12-11 RX ORDER — SULFACETAMIDE SODIUM 100 MG/ML
2 SOLUTION/ DROPS OPHTHALMIC 4 TIMES DAILY
Qty: 15 ML | Refills: 0 | Status: SHIPPED | OUTPATIENT
Start: 2024-12-11 | End: 2024-12-18

## 2024-12-18 ENCOUNTER — PATIENT MESSAGE (OUTPATIENT)
Dept: PRIMARY CARE | Facility: CLINIC | Age: 69
End: 2024-12-18
Payer: COMMERCIAL

## 2024-12-18 DIAGNOSIS — R23.3 EASY BRUISABILITY: Primary | ICD-10-CM

## 2024-12-21 DIAGNOSIS — M47.817 LUMBOSACRAL SPONDYLOSIS WITHOUT MYELOPATHY: Primary | ICD-10-CM

## 2024-12-21 NOTE — PROGRESS NOTES
Orders Placed This Encounter   Procedures    FL pain management     Standing Status:   Future     Standing Expiration Date:   12/21/2025     Order Specific Question:   Release result to [facility]     Answer:   Immediate [1]

## 2024-12-21 NOTE — H&P (VIEW-ONLY)
Orders Placed This Encounter   Procedures    FL pain management     Standing Status:   Future     Standing Expiration Date:   12/21/2025     Order Specific Question:   Release result to [facility]     Answer:   Immediate [1]

## 2024-12-23 ENCOUNTER — LAB (OUTPATIENT)
Dept: LAB | Facility: LAB | Age: 69
End: 2024-12-23
Payer: COMMERCIAL

## 2024-12-23 DIAGNOSIS — R23.3 EASY BRUISABILITY: ICD-10-CM

## 2024-12-23 LAB
APTT PPP: 31 SECONDS (ref 27–38)
BASOPHILS # BLD AUTO: 0.08 X10*3/UL (ref 0–0.1)
BASOPHILS NFR BLD AUTO: 1.1 %
EOSINOPHIL # BLD AUTO: 0.3 X10*3/UL (ref 0–0.7)
EOSINOPHIL NFR BLD AUTO: 4.1 %
ERYTHROCYTE [DISTWIDTH] IN BLOOD BY AUTOMATED COUNT: 14.4 % (ref 11.5–14.5)
HCT VFR BLD AUTO: 40.7 % (ref 36–46)
HGB BLD-MCNC: 13.3 G/DL (ref 12–16)
IMM GRANULOCYTES # BLD AUTO: 0.02 X10*3/UL (ref 0–0.7)
IMM GRANULOCYTES NFR BLD AUTO: 0.3 % (ref 0–0.9)
INR PPP: 1 (ref 0.9–1.1)
LYMPHOCYTES # BLD AUTO: 3.12 X10*3/UL (ref 1.2–4.8)
LYMPHOCYTES NFR BLD AUTO: 42.8 %
MCH RBC QN AUTO: 27.9 PG (ref 26–34)
MCHC RBC AUTO-ENTMCNC: 32.7 G/DL (ref 32–36)
MCV RBC AUTO: 86 FL (ref 80–100)
MONOCYTES # BLD AUTO: 0.68 X10*3/UL (ref 0.1–1)
MONOCYTES NFR BLD AUTO: 9.3 %
NEUTROPHILS # BLD AUTO: 3.09 X10*3/UL (ref 1.2–7.7)
NEUTROPHILS NFR BLD AUTO: 42.4 %
NRBC BLD-RTO: 0 /100 WBCS (ref 0–0)
PLATELET # BLD AUTO: 303 X10*3/UL (ref 150–450)
PROTHROMBIN TIME: 11 SECONDS (ref 9.8–12.8)
RBC # BLD AUTO: 4.76 X10*6/UL (ref 4–5.2)
WBC # BLD AUTO: 7.3 X10*3/UL (ref 4.4–11.3)

## 2025-01-03 DIAGNOSIS — M46.1 SACROILIITIS (CMS-HCC): Primary | ICD-10-CM

## 2025-01-03 NOTE — PROGRESS NOTES
Orders Placed This Encounter   Procedures    FL pain management     Standing Status:   Future     Standing Expiration Date:   1/3/2026     Order Specific Question:   Release result to [facility]     Answer:   Immediate [1]

## 2025-01-06 ENCOUNTER — TELEPHONE (OUTPATIENT)
Dept: PAIN MEDICINE | Facility: CLINIC | Age: 70
End: 2025-01-06
Payer: COMMERCIAL

## 2025-01-06 DIAGNOSIS — M54.17 LUMBOSACRAL NEURITIS: Primary | ICD-10-CM

## 2025-01-06 RX ORDER — LORAZEPAM 1 MG/1
TABLET ORAL
Qty: 1 TABLET | Refills: 0 | Status: SHIPPED | OUTPATIENT
Start: 2025-01-06 | End: 2025-01-09 | Stop reason: SDUPTHER

## 2025-01-06 NOTE — TELEPHONE ENCOUNTER
Called patient to get her scheduled for SI joint injection and she asked if she could please have [facility]  She asked if she could be have 3mg sent because the lower dose does not work for her and she doesn't feel it until she is home.      Her injection is scheduled for January 20.

## 2025-01-08 ENCOUNTER — PATIENT MESSAGE (OUTPATIENT)
Dept: PRIMARY CARE | Facility: CLINIC | Age: 70
End: 2025-01-08
Payer: COMMERCIAL

## 2025-01-08 DIAGNOSIS — M54.50 LOW BACK PAIN, UNSPECIFIED BACK PAIN LATERALITY, UNSPECIFIED CHRONICITY, UNSPECIFIED WHETHER SCIATICA PRESENT: ICD-10-CM

## 2025-01-08 RX ORDER — HYDROCODONE BITARTRATE AND ACETAMINOPHEN 5; 325 MG/1; MG/1
1 TABLET ORAL EVERY 6 HOURS PRN
Qty: 60 TABLET | Refills: 0 | Status: SHIPPED | OUTPATIENT
Start: 2025-01-08 | End: 2025-01-09 | Stop reason: SDUPTHER

## 2025-01-09 ENCOUNTER — TELEPHONE (OUTPATIENT)
Dept: PAIN MEDICINE | Facility: CLINIC | Age: 70
End: 2025-01-09
Payer: COMMERCIAL

## 2025-01-09 ENCOUNTER — PATIENT MESSAGE (OUTPATIENT)
Dept: PRIMARY CARE | Facility: CLINIC | Age: 70
End: 2025-01-09
Payer: COMMERCIAL

## 2025-01-09 DIAGNOSIS — M54.50 LOW BACK PAIN, UNSPECIFIED BACK PAIN LATERALITY, UNSPECIFIED CHRONICITY, UNSPECIFIED WHETHER SCIATICA PRESENT: ICD-10-CM

## 2025-01-09 DIAGNOSIS — M54.17 LUMBOSACRAL NEURITIS: ICD-10-CM

## 2025-01-09 RX ORDER — HYDROCODONE BITARTRATE AND ACETAMINOPHEN 5; 325 MG/1; MG/1
1 TABLET ORAL EVERY 6 HOURS PRN
Qty: 60 TABLET | Refills: 0 | Status: SHIPPED | OUTPATIENT
Start: 2025-01-09

## 2025-01-09 RX ORDER — LORAZEPAM 1 MG/1
TABLET ORAL
Qty: 3 TABLET | Refills: 0 | Status: SHIPPED | OUTPATIENT
Start: 2025-01-09

## 2025-01-20 ENCOUNTER — HOSPITAL ENCOUNTER (OUTPATIENT)
Dept: PAIN MEDICINE | Facility: CLINIC | Age: 70
Discharge: HOME | End: 2025-01-20
Payer: COMMERCIAL

## 2025-01-20 VITALS
OXYGEN SATURATION: 94 % | TEMPERATURE: 96.6 F | BODY MASS INDEX: 27.79 KG/M2 | RESPIRATION RATE: 16 BRPM | DIASTOLIC BLOOD PRESSURE: 77 MMHG | HEART RATE: 79 BPM | WEIGHT: 167 LBS | SYSTOLIC BLOOD PRESSURE: 141 MMHG

## 2025-01-20 DIAGNOSIS — M46.1 SACROILIITIS (CMS-HCC): ICD-10-CM

## 2025-01-20 PROCEDURE — 2550000001 HC RX 255 CONTRASTS: Performed by: ANESTHESIOLOGY

## 2025-01-20 PROCEDURE — 27096 INJECT SACROILIAC JOINT: CPT | Performed by: ANESTHESIOLOGY

## 2025-01-20 PROCEDURE — 2500000004 HC RX 250 GENERAL PHARMACY W/ HCPCS (ALT 636 FOR OP/ED): Performed by: ANESTHESIOLOGY

## 2025-01-20 RX ORDER — BUPIVACAINE HYDROCHLORIDE 7.5 MG/ML
INJECTION, SOLUTION EPIDURAL; RETROBULBAR AS NEEDED
Status: COMPLETED | OUTPATIENT
Start: 2025-01-20 | End: 2025-01-20

## 2025-01-20 RX ORDER — LIDOCAINE HYDROCHLORIDE 5 MG/ML
INJECTION, SOLUTION INFILTRATION; INTRAVENOUS AS NEEDED
Status: COMPLETED | OUTPATIENT
Start: 2025-01-20 | End: 2025-01-20

## 2025-01-20 RX ORDER — TRIAMCINOLONE ACETONIDE 40 MG/ML
INJECTION, SUSPENSION INTRA-ARTICULAR; INTRAMUSCULAR AS NEEDED
Status: COMPLETED | OUTPATIENT
Start: 2025-01-20 | End: 2025-01-20

## 2025-01-20 RX ADMIN — IOHEXOL 3 ML: 300 INJECTION, SOLUTION INTRAVENOUS at 09:21

## 2025-01-20 RX ADMIN — TRIAMCINOLONE ACETONIDE 40 MG: 40 INJECTION, SUSPENSION INTRA-ARTICULAR; INTRAMUSCULAR at 09:21

## 2025-01-20 RX ADMIN — BUPIVACAINE HYDROCHLORIDE 10 ML: 7.5 INJECTION, SOLUTION EPIDURAL; RETROBULBAR at 09:21

## 2025-01-20 RX ADMIN — LIDOCAINE HYDROCHLORIDE 10 ML: 5 INJECTION, SOLUTION INFILTRATION at 09:20

## 2025-01-20 ASSESSMENT — PAIN - FUNCTIONAL ASSESSMENT: PAIN_FUNCTIONAL_ASSESSMENT: 0-10

## 2025-01-20 ASSESSMENT — ENCOUNTER SYMPTOMS
DEPRESSION: 0
OCCASIONAL FEELINGS OF UNSTEADINESS: 1
LOSS OF SENSATION IN FEET: 0

## 2025-01-20 ASSESSMENT — PAIN SCALES - GENERAL
PAINLEVEL_OUTOF10: 10 - WORST POSSIBLE PAIN
PAINLEVEL_OUTOF10: 9

## 2025-01-28 ENCOUNTER — APPOINTMENT (OUTPATIENT)
Dept: PRIMARY CARE | Facility: CLINIC | Age: 70
End: 2025-01-28
Payer: COMMERCIAL

## 2025-01-28 VITALS
HEART RATE: 67 BPM | TEMPERATURE: 97.8 F | HEIGHT: 68 IN | BODY MASS INDEX: 27.58 KG/M2 | OXYGEN SATURATION: 95 % | SYSTOLIC BLOOD PRESSURE: 125 MMHG | DIASTOLIC BLOOD PRESSURE: 77 MMHG | WEIGHT: 182 LBS

## 2025-01-28 DIAGNOSIS — M54.17 LUMBOSACRAL NEURITIS: ICD-10-CM

## 2025-01-28 DIAGNOSIS — M54.50 LOW BACK PAIN, UNSPECIFIED BACK PAIN LATERALITY, UNSPECIFIED CHRONICITY, UNSPECIFIED WHETHER SCIATICA PRESENT: ICD-10-CM

## 2025-01-28 DIAGNOSIS — F41.9 ANXIETY DISORDER, UNSPECIFIED TYPE: Primary | ICD-10-CM

## 2025-01-28 PROBLEM — I47.10 PAROXYSMAL SUPRAVENTRICULAR TACHYCARDIA (CMS-HCC): Status: RESOLVED | Noted: 2017-03-30 | Resolved: 2025-01-28

## 2025-01-28 PROBLEM — I47.10 PAROXYSMAL SVT (SUPRAVENTRICULAR TACHYCARDIA) (CMS-HCC): Status: RESOLVED | Noted: 2023-06-07 | Resolved: 2025-01-28

## 2025-01-28 PROCEDURE — 1158F ADVNC CARE PLAN TLK DOCD: CPT | Performed by: INTERNAL MEDICINE

## 2025-01-28 PROCEDURE — 3008F BODY MASS INDEX DOCD: CPT | Performed by: INTERNAL MEDICINE

## 2025-01-28 PROCEDURE — 1036F TOBACCO NON-USER: CPT | Performed by: INTERNAL MEDICINE

## 2025-01-28 PROCEDURE — 1123F ACP DISCUSS/DSCN MKR DOCD: CPT | Performed by: INTERNAL MEDICINE

## 2025-01-28 PROCEDURE — 3074F SYST BP LT 130 MM HG: CPT | Performed by: INTERNAL MEDICINE

## 2025-01-28 PROCEDURE — 1159F MED LIST DOCD IN RCRD: CPT | Performed by: INTERNAL MEDICINE

## 2025-01-28 PROCEDURE — 99213 OFFICE O/P EST LOW 20 MIN: CPT | Performed by: INTERNAL MEDICINE

## 2025-01-28 PROCEDURE — 3078F DIAST BP <80 MM HG: CPT | Performed by: INTERNAL MEDICINE

## 2025-01-28 PROCEDURE — G2211 COMPLEX E/M VISIT ADD ON: HCPCS | Performed by: INTERNAL MEDICINE

## 2025-01-28 NOTE — PROGRESS NOTES
"PCP: Heriberto Keller MD   I have reviewed existing histories, notes, past medical history, surgical history, social history, family history, med list, allergy list, and immunization, and updated.    HPI:   Med refills for anxiety and chronic pain.  Has a cold since 3 days ago, just Nasal congestion, no Fever and chills or sob.    Lab Results   Component Value Date    WBC 7.3 12/23/2024    HGB 13.3 12/23/2024    HCT 40.7 12/23/2024     12/23/2024    CHOL 238 (H) 10/02/2024    TRIG 147 10/02/2024    HDL 54.4 10/02/2024    ALT 16 10/02/2024    AST 13 10/02/2024     10/02/2024    K 4.5 10/02/2024     10/02/2024    CREATININE 0.97 10/02/2024    BUN 18 10/02/2024    CO2 29 10/02/2024    TSH 2.22 07/10/2023    INR 1.0 12/23/2024    HGBA1C 5.9 (H) 10/02/2024     Physical exam:  /77   Pulse 67   Temp 36.6 °C (97.8 °F)   Ht 1.715 m (5' 7.5\")   Wt 82.6 kg (182 lb)   LMP  (LMP Unknown)   SpO2 95%   BMI 28.08 kg/m²    Ears normal  Nasal congestion  Throat normal no tonsillitis  Heart RR  Lungs clear  No leg edema.      Assessments/orders:   Assessment & Plan  Anxiety disorder, unspecified type         Lumbosacral neuritis         Low back pain, unspecified back pain laterality, unspecified chronicity, unspecified whether sciatica present            OARRS:  Heriberto Keller MD on 1/28/2025 10:26 AM  I have personally reviewed the OARRS report for Ac Torres. I have considered the risks of abuse, dependence, addiction and diversion    Is the patient prescribed a combination of a benzodiazepine and opioid?  Yes, I feel it is clincially indicated to continue the medication and have discussed with the patient risks/benefits/alternatives.    Last Urine Drug Screen / ordered today: Yes  Recent Results (from the past 8760 hours)   Confirmation Opiate/Opioid/Benzo Prescription Compliance    Collection Time: 07/10/24  9:24 AM   Result Value Ref Range    Clonazepam <25 <25 ng/mL    7-Aminoclonazepam <25 <25 " ng/mL    Alprazolam <25 <25 ng/mL    Alpha-Hydroxyalprazolam <25 <25 ng/mL    Midazolam <25 <25 ng/mL    Alpha-Hydroxymidazolam <25 <25 ng/mL    Chlordiazepoxide <25 <25 ng/mL    Diazepam <25 <25 ng/mL    Nordiazepam <25 <25 ng/mL    Temazepam <25 <25 ng/mL    Oxazepam <25 <25 ng/mL    Lorazepam 333 (H) <25 ng/mL    Methadone <25 <25 ng/mL    EDDP <25 <25 ng/mL    6-Acetylmorphine <25 <25 ng/mL    Codeine <50 <50 ng/mL    Hydrocodone 143 (H) <25 ng/mL    Hydromorphone 93 (H) <25 ng/mL    Morphine  <50 <50 ng/mL    Norhydrocodone 745 (H) <25 ng/mL    Noroxycodone <25 <25 ng/mL    Oxycodone <25 <25 ng/mL    Oxymorphone <25 <25 ng/mL    Fentanyl <2.5 <2.5 ng/mL    Norfentanyl <2.5 <2.5 ng/mL    Tramadol <50 <50 ng/mL    O-Desmethyltramadol <50 <50 ng/mL    Zolpidem <25 <25 ng/mL    Zolpidem Metabolite (ZCA) <25 <25 ng/mL   Screen Opiate/Opioid/Benzo Prescription Compliance    Collection Time: 07/10/24  9:24 AM   Result Value Ref Range    Creatinine, Urine Random 294.7 20.0 - 320.0 mg/dL    Amphetamine Screen, Urine Presumptive Negative Presumptive Negative    Barbiturate Screen, Urine Presumptive Negative Presumptive Negative    Cannabinoid Screen, Urine Presumptive Negative Presumptive Negative    Cocaine Metabolite Screen, Urine Presumptive Negative Presumptive Negative    PCP Screen, Urine Presumptive Negative Presumptive Negative     Results are as expected.         Controlled Substance Agreement:  Date of the Last Agreement: Dictation #1  MRN:[id number]  [facility]:[id number] 6 months ago  Reviewed Controlled Substance Agreement including but not limited to the benefits, risks, and alternatives to treatment with a Controlled Substance medication(s).    Opioids:  What is the patient's goal of therapy? Chronic back pain.  Is this being achieved with current treatment? yes    I have calculated the patient's Morphine Dose Equivalent (MED):   I have considered referral to Pain Management and/or a specialist, and do not feel it  is necessary at this time.    I feel that it is clinically indicated to continue this current medication regimen after consideration of alternative therapies, and other non-opioid treatment.    Opioid Risk Screening:  Family History of Substance Abuse  Alcohol: 0 (2024  9:00 AM)  Illegal Dru (2024  9:00 AM)  Prescription Dru (2024  9:00 AM)    Personal History of Substance Abuse  Alcohol: 0 (2024  9:00 AM)  Illegal Drugs: 0 (2024  9:00 AM)  Prescription Drugs: 0 (2024  9:00 AM)    Patient Age (16-45)  Age (16-45): 0 (2024  9:00 AM)    History of Preadolescent Sexual Abuse  History of Preadolescent Sexual Abuse: 0 (2024  9:00 AM)    Psychological Disease  Attention Deficit Disorder, Obsessive Compulsive Disorder, Bipolar, Schizophrenia: 0 (2024  9:00 AM)  Depression: 0 (2024  9:00 AM)    Total Score  Total Score: 0 (2024  9:00 AM)    Total Score Risk Category  TOTAL SCORE CATEGORY: Low Risk (0-3) (2024  9:00 AM)      and Benzodiazepines:  What is the patient's goal of therapy?  anxiety  Is this being achieved with current treatment? yes      Activities of Daily Living:   Is your overall impression that this patient is benefiting (symptom reduction outweighs side effects) from benzodiazepine therapy? Yes     1. Physical Functioning: Better  2. Family Relationship: Better  3. Social Relationship: Better  4. Mood: Better  5. Sleep Patterns: Better  6. Overall Function: Better

## 2025-01-29 ENCOUNTER — APPOINTMENT (OUTPATIENT)
Dept: PRIMARY CARE | Facility: CLINIC | Age: 70
End: 2025-01-29
Payer: COMMERCIAL

## 2025-01-30 NOTE — PROGRESS NOTES
This is 69 y.o.  female with who has been treated for Sacroiliitis . Pain is {DESC; BETTER/WORSE:10782}, The pain is described as {Pain description:72326} and is relieved by {pain relief:49921} with who is here for follow-up No chief complaint on file.    6 to 8-week follow-up  Pain Therapies: left sacroiliac joint Injection 1/20/2025

## 2025-02-05 ENCOUNTER — APPOINTMENT (OUTPATIENT)
Dept: CARDIOLOGY | Facility: CLINIC | Age: 70
End: 2025-02-05
Payer: COMMERCIAL

## 2025-02-10 ENCOUNTER — PATIENT MESSAGE (OUTPATIENT)
Dept: PRIMARY CARE | Facility: CLINIC | Age: 70
End: 2025-02-10
Payer: COMMERCIAL

## 2025-02-10 DIAGNOSIS — F41.1 GENERALIZED ANXIETY DISORDER: ICD-10-CM

## 2025-02-10 RX ORDER — LORAZEPAM 1 MG/1
1 TABLET ORAL DAILY PRN
Qty: 30 TABLET | Refills: 0 | Status: SHIPPED | OUTPATIENT
Start: 2025-02-10 | End: 2025-08-09

## 2025-02-19 ENCOUNTER — TELEPHONE (OUTPATIENT)
Dept: ALLERGY | Facility: CLINIC | Age: 70
End: 2025-02-19
Payer: COMMERCIAL

## 2025-02-21 ENCOUNTER — APPOINTMENT (OUTPATIENT)
Dept: CARDIOLOGY | Facility: CLINIC | Age: 70
End: 2025-02-21
Payer: COMMERCIAL

## 2025-02-24 ENCOUNTER — APPOINTMENT (OUTPATIENT)
Dept: ALLERGY | Facility: CLINIC | Age: 70
End: 2025-02-24
Payer: COMMERCIAL

## 2025-02-24 VITALS
SYSTOLIC BLOOD PRESSURE: 120 MMHG | OXYGEN SATURATION: 96 % | HEIGHT: 66 IN | TEMPERATURE: 97.7 F | BODY MASS INDEX: 28.61 KG/M2 | WEIGHT: 178 LBS | DIASTOLIC BLOOD PRESSURE: 83 MMHG | HEART RATE: 69 BPM

## 2025-02-24 DIAGNOSIS — J31.0 CHRONIC RHINITIS: ICD-10-CM

## 2025-02-24 DIAGNOSIS — L50.8 CHRONIC URTICARIA: Primary | ICD-10-CM

## 2025-02-24 DIAGNOSIS — J30.1 SEASONAL ALLERGIC RHINITIS DUE TO POLLEN: ICD-10-CM

## 2025-02-24 PROCEDURE — 99214 OFFICE O/P EST MOD 30 MIN: CPT | Performed by: ALLERGY & IMMUNOLOGY

## 2025-02-24 PROCEDURE — 1159F MED LIST DOCD IN RCRD: CPT | Performed by: ALLERGY & IMMUNOLOGY

## 2025-02-24 PROCEDURE — 3079F DIAST BP 80-89 MM HG: CPT | Performed by: ALLERGY & IMMUNOLOGY

## 2025-02-24 PROCEDURE — 3008F BODY MASS INDEX DOCD: CPT | Performed by: ALLERGY & IMMUNOLOGY

## 2025-02-24 PROCEDURE — 3074F SYST BP LT 130 MM HG: CPT | Performed by: ALLERGY & IMMUNOLOGY

## 2025-02-24 PROCEDURE — 1123F ACP DISCUSS/DSCN MKR DOCD: CPT | Performed by: ALLERGY & IMMUNOLOGY

## 2025-02-24 RX ORDER — LEVOCETIRIZINE DIHYDROCHLORIDE 5 MG/1
5 TABLET, FILM COATED ORAL DAILY
Qty: 30 TABLET | Refills: 11 | Status: SHIPPED | OUTPATIENT
Start: 2025-02-24 | End: 2026-02-24

## 2025-02-24 RX ORDER — AZELASTINE 1 MG/ML
2 SPRAY, METERED NASAL 2 TIMES DAILY PRN
Qty: 30 ML | Refills: 11 | Status: SHIPPED | OUTPATIENT
Start: 2025-02-24

## 2025-02-24 RX ORDER — FLUTICASONE PROPIONATE 50 MCG
2 SPRAY, SUSPENSION (ML) NASAL DAILY
Qty: 16 G | Refills: 11 | Status: SHIPPED | OUTPATIENT
Start: 2025-02-24 | End: 2026-02-24

## 2025-02-24 ASSESSMENT — ENCOUNTER SYMPTOMS
GASTROINTESTINAL NEGATIVE: 1
EYES NEGATIVE: 1
RESPIRATORY NEGATIVE: 1
CARDIOVASCULAR NEGATIVE: 1
HEMATOLOGIC/LYMPHATIC NEGATIVE: 1
CONSTITUTIONAL NEGATIVE: 1
ALLERGIC/IMMUNOLOGIC NEGATIVE: 1

## 2025-02-24 NOTE — PATIENT INSTRUCTIONS
Continue levocetirizine (Xyzal) 5 mg once to twice daily    Continue Flonase (fluticasone) 2 sprays each nostril once daily    Start Astelin (Azelastine) 2 sprays each nostril in the evening.  You may use this twice daily if helpful.  This medication tastes bitter and can make you sleepy    If it doesn't help with runny nose, please let us know    Our nurse line phone number is [phone] if you have any concerns     We would like to see you in follow up in 6 months or sooner if needed

## 2025-02-24 NOTE — PROGRESS NOTES
"Ac Torres presents for follow up evaluation today for chronic urticaria and allergic rhinitis.  She was last seen in 9/2024.  She states that she has been doing relatively well since last visit. She had a covid infection in fall 2024 which she states was mild.  She did not have flare of hives at that time.  She is using levocetirizine 5 mg daily.  She notes that she still gets redness and itching on the skin in areas where pressure is applied (leaning on surface, carrying handbag, etc).  Regarding rhinitis, she notes that the Flonase has helped with congestion but she continues to have constant runny nose and sniffling.     Review of Systems   Constitutional: Negative.    Eyes: Negative.    Respiratory: Negative.     Cardiovascular: Negative.    Gastrointestinal: Negative.    Skin: Negative.    Allergic/Immunologic: Negative.    Hematological: Negative.        Vital signs:  /83 (BP Location: Right arm, Patient Position: Sitting, BP Cuff Size: Adult)   Pulse 69   Temp 36.5 °C (97.7 °F)   Ht 1.676 m (5' 6\") Comment: stated by patient  Wt 80.7 kg (178 lb) Comment: stated by patient.  weighed @ home on 02/23/2025 (clothes deducted)  LMP  (LMP Unknown)   SpO2 96%   BMI 28.73 kg/m²     GENERAL: Alert, oriented and in no acute distress.     HEENT: EYES: No conjunctival injection or cobblestoning. Nose: nasal turbinates are not edematous and are not boggy.  There is no mucous stranding, polyps, or blood    noted. EARS: Tympanic membranes are clear. MOUTH: moist and pink with no exudates, ulcers, or thrush. NECK: is supple, without adenopathy.  No upper airway stridor noted.       HEART: regular rate and rhythm.       LUNGS: Clear to auscultation bilaterally. No wheezing, rhonchi or rales.        ABDOMEN: Positive bowel sounds, soft, nontender, nondistended.       EXTREMITIES: No clubbing or edema.        NEURO:  Normal affect.  Gait normal.      SKIN: No rash, hives, or angioedema noted    Impression: "   1. Chronic urticaria    2. Seasonal allergic rhinitis due to pollen    3. Chronic rhinitis      Assessment and plan:  Chronic Urticaria: Doing well on current therapy. Continue levocetirizine 5 mg once to twice daily. Continue to avoid NSAIDs and limit use of narcotic pain medications which can trigger hives. Continue to stay up to date with age-appropriate cancer screenings. Total IgE, serum tryptase, CU index, TSH, antithyroid antibodies were within normal limits.      Allergic rhinitis, seasonal: Environmental allergens specific IgE panel positive for tree, grass, weed, mold.  Continue Flonase 2 sprays each nostril daily.  She may try Astelin 2 sprays each nostril up to BID for runny nose.  If not helpful, can trial nasal ipratropium.      Plan for follow up in 6 months or sooner if needed

## 2025-02-26 ENCOUNTER — APPOINTMENT (OUTPATIENT)
Dept: CARDIOLOGY | Facility: CLINIC | Age: 70
End: 2025-02-26
Payer: COMMERCIAL

## 2025-03-03 ENCOUNTER — TELEPHONE (OUTPATIENT)
Dept: PRIMARY CARE | Facility: CLINIC | Age: 70
End: 2025-03-03
Payer: COMMERCIAL

## 2025-03-04 DIAGNOSIS — Z12.31 ENCOUNTER FOR SCREENING MAMMOGRAM FOR MALIGNANT NEOPLASM OF BREAST: ICD-10-CM

## 2025-03-04 NOTE — TELEPHONE ENCOUNTER
See pt's email. She has some pain in breast, please schedule an office visit so I can check her breasts. Schedule within next week or so.  Thanks

## 2025-03-11 ENCOUNTER — APPOINTMENT (OUTPATIENT)
Dept: PRIMARY CARE | Facility: CLINIC | Age: 70
End: 2025-03-11
Payer: COMMERCIAL

## 2025-03-11 VITALS
OXYGEN SATURATION: 97 % | TEMPERATURE: 98 F | WEIGHT: 182 LBS | HEIGHT: 65 IN | DIASTOLIC BLOOD PRESSURE: 75 MMHG | SYSTOLIC BLOOD PRESSURE: 110 MMHG | HEART RATE: 84 BPM | BODY MASS INDEX: 30.32 KG/M2

## 2025-03-11 DIAGNOSIS — N64.4 PAIN OF BOTH BREASTS: Primary | ICD-10-CM

## 2025-03-11 PROCEDURE — 99213 OFFICE O/P EST LOW 20 MIN: CPT | Performed by: INTERNAL MEDICINE

## 2025-03-11 PROCEDURE — 1158F ADVNC CARE PLAN TLK DOCD: CPT | Performed by: INTERNAL MEDICINE

## 2025-03-11 PROCEDURE — 3008F BODY MASS INDEX DOCD: CPT | Performed by: INTERNAL MEDICINE

## 2025-03-11 PROCEDURE — 1123F ACP DISCUSS/DSCN MKR DOCD: CPT | Performed by: INTERNAL MEDICINE

## 2025-03-11 PROCEDURE — 3074F SYST BP LT 130 MM HG: CPT | Performed by: INTERNAL MEDICINE

## 2025-03-11 PROCEDURE — 3078F DIAST BP <80 MM HG: CPT | Performed by: INTERNAL MEDICINE

## 2025-03-11 PROCEDURE — 1036F TOBACCO NON-USER: CPT | Performed by: INTERNAL MEDICINE

## 2025-03-11 PROCEDURE — 1159F MED LIST DOCD IN RCRD: CPT | Performed by: INTERNAL MEDICINE

## 2025-03-11 NOTE — PROGRESS NOTES
"PCP: Heriberto Keller MD   I have reviewed existing histories, notes, past medical history, surgical history, social history, family history, med list, allergy list, and immunization, and updated.    HPI:   Pt Complains of  both breast pain for 3 weeks. No injury prior to onset. No discharge.  No skin changes.   Due for screening mammogram  otherwise doing well.    Has htn, hyperlipidemia. She is not good with taking meds daily. She takes hydrochlorothiazide as needed.   Pravastatin, not every day.  Declined vaccines today    Lab Results   Component Value Date    WBC 7.3 12/23/2024    HGB 13.3 12/23/2024    HCT 40.7 12/23/2024     12/23/2024    CHOL 238 (H) 10/02/2024    TRIG 147 10/02/2024    HDL 54.4 10/02/2024    ALT 16 10/02/2024    AST 13 10/02/2024     10/02/2024    K 4.5 10/02/2024     10/02/2024    CREATININE 0.97 10/02/2024    BUN 18 10/02/2024    CO2 29 10/02/2024    TSH 2.22 07/10/2023    INR 1.0 12/23/2024    HGBA1C 5.9 (H) 10/02/2024     Physical exam:  /75   Pulse 84   Temp 36.7 °C (98 °F)   Ht 1.651 m (5' 5\")   Wt 82.6 kg (182 lb)   LMP  (LMP Unknown)   SpO2 97%   BMI 30.29 kg/m²    Breasts are symmetric. No dominant, discrete, fixed or suspicious masses noted. No skin or nipple changes or axillary nodes.      Assessments/orders:   Assessment & Plan  Pain of both breasts    Orders:    BI mammo bilateral diagnostic; Future                     "

## 2025-03-12 NOTE — PROGRESS NOTES
SUBJECTIVE:  This is 69 y.o.  female with PMH of anxiety/depression, obesity BMI of 31, anxiety, hypertension, hyperlipidemia, prediabetic, multiple arthritis, chronic lower back pain on Vicodin 5 mg twice daily, Percocet 5 mg, lorazepam 1 mg QD from PCP.  Patient complained of chronic lower back pain responded in the past to MBB in the last visit she had sacroiliitis and she received left SI joint injection on 1/20/2025 who is here for follow-up ***      Prior office visit:  11/22/2024: This is 69 y.o.  female with PMH of obesity BMI of 31, anxiety, hypertension, chronic lower back pain on Vicodin 5 mg twice daily, Percocet 5 mg, lorazepam 1 mg QD from PCP.  Patient gets great response to bilateral L3-5 MBB with 100% relief initially last injection was on 9/24/2024 correlate with the MRI finding that does not have any major canal or foraminal stenosis but significant facet joint hypertrophy with spondylolisthesis at L4-5 who is here for follow-up the patient had 80% improvement in pain and function after her last bilateral L3-5 MBB.  I explained to her today about the radiofrequency ablation and if her pain comes back we will plan on bilateral L3-5 MB RFA.  And prior to that she had 100% improvement in pain function after last 5/20/2024. er the SI.  Patient received on 6/17/2020 for bilateral L3-5 MBB with 100% relief of pain and improvement in function till she started moving heavy stuff on Sunday 2 days ago and the pain started coming back but she is hoping that it is going to ease back again.  The patient has no incontinence no saddle paresthesia no radiation to lower extremities and no numbness no tingling.  Will plan on bilateral L3-5 MBB under fluoroscopy guidance if the pain does not improve by itself order and consent were done.              Last procedure:   1/20/2025 left SI injection patient has had ***% chromogen pain function  9/24/2024 bilateral L3-5 MBB patient has had 80% improvement in pain and  function  6/17/2024 Bilateral L3-5 MBB patient has had 100% improvement in pain and function did she start lifting heavy stuff 3 days ago and the pain started coming back  5/20/2024 left SI joint the patient has had a 100% improvement in pain and function  1/15/2024 left SI joint injection patient has had 0% improvement in pain and function  7/24/2023 left SI joint injection the patient has had 80% improvement in pain and function  4/24/2023 left SI joint injection has had a 50% relief for 4 to 5 weeks  11/7/2022 left SI injection the patient has had a 100% improvement in pain and function for almost 2 months  9/19/2022 left SI injection the patient has had a 100% improvement in pain and function and the pain just came back  8/8/2022 left L3-5 MBB the patient has had a 70% improvement in pain and function  Caudal and bilateral L5-S1 interlaminar JULIO C by Lydia the patient has had helped with the legs numbness but never affected the lower back pain which is her major problem     Portions of record reviewed for pertinent issues: active problem list, medication list, allergies, family history, social history, notes from last encounter, encounters, lab results, imaging and other system records.           I have personally reviewed the OARRS report for this patient. This report is scanned into the electronic medical record. I have considered the risks of abuse, dependence, addiction and diversion. It showed Percocet 5 mg once daily and lorazepam 1 mg HS from Heriberto Keller MD   OPIOID RISK ASSESSMENT SCORE 0/26  Aberrant behavior: None        Social history: The patient is retired, she has 3 children and 7 grandchildren, finished few college classes denies smoking drinking or use of illicit drugs     Diagnostic studies:  7/19/2022 cervical x-ray showed mild spondylotic changes with mild degeneration in the lower cervical spine     12/8/2021 EMG of the left leg was within normal limits     11/6/2021 MRI of the lumbar spine  did not show any bone marrow edema or endplate changes, grade 1 spondylosis at the L4-5 but no canal stenosis no foraminal stenosis but significant facet hypertrophy and spondylosis worse at the L4-5 and L5-S1:  FINDINGS:  Mild levoscoliosis noted. Conus medullaris terminates at the level of  the L1 vertebral body. Visualized portions of the conus and the  spinal cord shows normal signal intensity and caliber.     Vertebral body heights are well maintained. Mild heterogeneous bone  marrow signal intensity is normal. Trace anterolisthesis of L4 on L5,  L3 on L4.     At T12-L1 trace retrolisthesis of T12 on L1, diffuse disc bulge, mild  facet joint arthropathy in combination noted causing mild central  spinal canal stenosis and minimal encroachment of the bilateral  foramina.     At L1-L2 mild disc bulge, facet joint arthropathy in combination  noted causing mild central spinal canal stenosis, and mild bilateral  neural foramina narrowing.     At L2-L3 mild disc bulge, facet joint arthropathy, ligamentum flavum  hypertrophy noted causing minimal encroachment of the spinal canal  and bilateral neural foramina.     At L3-L4 trace anterolisthesis of L3 on L4, minimal disc bulge,  significant facet joint arthropathy, mild ligamentum flavum  hypertrophy, no significant central spinal canal or neural foramina  stenosis.     At L4-L5 grade 1 anterolisthesis of L4 on L5, significant facet joint  arthropathy noted causing mild encroachment of the spinal canal and  right neural foramina.     At L5-S1 mild facet joint arthropathy noted, mild disc bulge, without  any significant central spinal canal or neural foramina stenosis.     IMPRESSION:  There is mild degenerative spinal canal or neural foramina stenosis  at T12-L1, L1-L2, L2-L3.              Procedures:  *** the patient has had a ***% improvement in pain and function      Portions of record reviewed for pertinent issues: active problem list, medication list, allergies,  family history, social history, notes from last encounter, encounters, lab results, imaging and other available records.      I have personally reviewed the OARRS report for this patient. This report is scanned into the electronic medical record. I have considered the risks of abuse, dependence, addiction and diversion. It showed: ***  OPIOID RISK ASSESSMENT SCORE ***/26  Opioid agreement: ***  Activities of daily living: ***  Adverse effects: ***  Analgesia: W/O: ***/10, W ***/10    Toxicology screen: ***  Aberrant behavior: ***  My patient has no underlying substance abuse or alcohol abuse and there's no mental health conditions contributing to the patient's pain.      Review of Systems     Physical Exam                 Plan  At least 50% of the visit was involved in the discussion of the options for treatment. We discussed exercises, medication, interventional therapies and surgery. Healthy life style is essential with patient hard work to achieve the wellness. In addition; discussion with the patient and/or family about any of the diagnostic results, impressions and/or recommended diagnostic studies, prognosis, risks and benefits of treatment options, instructions for treatment and/or follow-up, importance of compliance with chosen treatment options, risk-factor reduction, and patient/family education.         Recommended Pool therapy, walking in the pool, at least 3x per week for 30 minutes  Continue self-directed physical therapy with at least daily exercises for minimum of 20-minute, brochure was handed to the patient  *** Smoking cessation  Healthy lifestyle and anti-inflammatory diet in addition to weight control discussed with the patient  Alternative chronic pain therapies was discussed, encouraged and information was handed  Return to Clinic ***     *Please note this report has been produced using speech recognition software and may contain errors related to that system including grammar, punctuation and  spelling as well as words and phrases that may be inappropriate. If there are questions or concerns, please feel free to contact me to clarify.    Stacey Flaherty MD

## 2025-03-14 DIAGNOSIS — J30.1 SEASONAL ALLERGIC RHINITIS DUE TO POLLEN: ICD-10-CM

## 2025-03-14 DIAGNOSIS — L50.8 CHRONIC URTICARIA: ICD-10-CM

## 2025-03-14 RX ORDER — LEVOCETIRIZINE DIHYDROCHLORIDE 5 MG/1
5 TABLET, FILM COATED ORAL 2 TIMES DAILY
Qty: 180 TABLET | Refills: 3 | Status: SHIPPED | OUTPATIENT
Start: 2025-03-14

## 2025-03-17 DIAGNOSIS — M54.50 LOW BACK PAIN, UNSPECIFIED BACK PAIN LATERALITY, UNSPECIFIED CHRONICITY, UNSPECIFIED WHETHER SCIATICA PRESENT: ICD-10-CM

## 2025-03-17 RX ORDER — HYDROCODONE BITARTRATE AND ACETAMINOPHEN 5; 325 MG/1; MG/1
1 TABLET ORAL EVERY 6 HOURS PRN
Qty: 60 TABLET | Refills: 0 | Status: SHIPPED | OUTPATIENT
Start: 2025-03-17

## 2025-03-18 ENCOUNTER — APPOINTMENT (OUTPATIENT)
Dept: PAIN MEDICINE | Facility: CLINIC | Age: 70
End: 2025-03-18
Payer: COMMERCIAL

## 2025-03-19 ENCOUNTER — ANCILLARY PROCEDURE (OUTPATIENT)
Dept: CARDIOLOGY | Facility: CLINIC | Age: 70
End: 2025-03-19
Payer: COMMERCIAL

## 2025-03-19 DIAGNOSIS — I10 PRIMARY HYPERTENSION: ICD-10-CM

## 2025-03-19 DIAGNOSIS — Z86.79 S/P RF ABLATION OPERATION FOR ARRHYTHMIA: ICD-10-CM

## 2025-03-19 DIAGNOSIS — R00.2 PALPITATIONS: ICD-10-CM

## 2025-03-19 DIAGNOSIS — I47.10 PAROXYSMAL SVT (SUPRAVENTRICULAR TACHYCARDIA) (CMS-HCC): ICD-10-CM

## 2025-03-19 DIAGNOSIS — R06.09 OTHER FORMS OF DYSPNEA: ICD-10-CM

## 2025-03-19 DIAGNOSIS — R94.31 ABNORMAL EKG: ICD-10-CM

## 2025-03-19 DIAGNOSIS — R07.89 ATYPICAL CHEST PAIN: ICD-10-CM

## 2025-03-19 DIAGNOSIS — E78.00 PURE HYPERCHOLESTEROLEMIA: ICD-10-CM

## 2025-03-19 DIAGNOSIS — Z98.890 S/P RF ABLATION OPERATION FOR ARRHYTHMIA: ICD-10-CM

## 2025-03-19 LAB
AORTIC VALVE PEAK VELOCITY: 1.28 M/S
AV PEAK GRADIENT: 7 MMHG
AVA (PEAK VEL): 2.86 CM2
EJECTION FRACTION APICAL 4 CHAMBER: 58.8
EJECTION FRACTION: 60 %
LEFT ATRIUM VOLUME AREA LENGTH INDEX BSA: 18.2 ML/M2
LEFT VENTRICLE INTERNAL DIMENSION DIASTOLE: 4.18 CM (ref 3.5–6)
LEFT VENTRICULAR OUTFLOW TRACT DIAMETER: 2.24 CM
MITRAL VALVE E/A RATIO: 0.72
RIGHT VENTRICLE FREE WALL PEAK S': 12 CM/S
RIGHT VENTRICLE PEAK SYSTOLIC PRESSURE: 23 MMHG
TRICUSPID ANNULAR PLANE SYSTOLIC EXCURSION: 2 CM

## 2025-03-19 PROCEDURE — 93306 TTE W/DOPPLER COMPLETE: CPT | Performed by: STUDENT IN AN ORGANIZED HEALTH CARE EDUCATION/TRAINING PROGRAM

## 2025-03-19 PROCEDURE — 93306 TTE W/DOPPLER COMPLETE: CPT

## 2025-03-20 NOTE — PROGRESS NOTES
SUBJECTIVE:  Heydi is a pleasant 69 y.o.  female with PMH of obesity BMI of 31, anxiety, hypertension, chronic lower back pain on minimal hydrocodone 5 mg and 1 lorazepam at bedtime from PCP. Patient gets great response to bilateral L3-5 MBB with 100% relief initially last injection was on 9/24/2024 finding correlate with MRI finding from 2021.  Patient also developed sacroiliitis and treated with bilateral SI injection on 1/20/2025 who is here for follow-up stating that her injections helped for 90% but in the last week she started having significant left-sided lower back pain going down all the way to her buttock and then goes to the anterior thigh but the leg pain is intermittent.  The patient is doing her stretching and physical therapy at home that she learned through the years from her physical therapist without benefit.  Her exam today showed significant tenderness with limited range of motion in the left side of her back.  There is sit slump positive on the left.  Will plan on new MRI since her last MRI was from 2021.  Toradol 60 mg was given in office and prednisone taper dose was ordered for the patient.  I would like to see her immediately after her MRI for possible referral to surgery      Prior office visit:  11/22/2024: This is 69 y.o.  female with PMH of obesity BMI of 31, anxiety, hypertension, chronic lower back pain on Vicodin 5 mg twice daily, Percocet 5 mg, lorazepam 1 mg QD from PCP.  Patient gets great response to bilateral L3-5 MBB with 100% relief initially last injection was on 9/24/2024 correlate with the MRI finding that does not have any major canal or foraminal stenosis but significant facet joint hypertrophy with spondylolisthesis at L4-5 who is here for follow-up the patient had 80% improvement in pain and function after her last bilateral L3-5 MBB.  I explained to her today about the radiofrequency ablation and if her pain comes back we will plan on bilateral L3-5 MB RFA.  And prior  to that she had 100% improvement in pain function after last 5/20/2024. er the SI.  Patient received on 6/17/2020 for bilateral L3-5 MBB with 100% relief of pain and improvement in function till she started moving heavy stuff on Sunday 2 days ago and the pain started coming back but she is hoping that it is going to ease back again.  The patient has no incontinence no saddle paresthesia no radiation to lower extremities and no numbness no tingling.  Will plan on bilateral L3-5 MBB under fluoroscopy guidance if the pain does not improve by itself order and consent were done.              Last procedure:   1/20/2025 bilateral SI injection patient has had 90% chromogen pain function  9/24/2024 bilateral L3-5 MBB patient has had 80% improvement in pain and function  6/17/2024 Bilateral L3-5 MBB patient has had 100% improvement in pain and function did she start lifting heavy stuff 3 days ago and the pain started coming back  5/20/2024 left SI joint the patient has had a 100% improvement in pain and function  1/15/2024 left SI joint injection patient has had 0% improvement in pain and function  7/24/2023 left SI joint injection the patient has had 80% improvement in pain and function  4/24/2023 left SI joint injection has had a 50% relief for 4 to 5 weeks  11/7/2022 left SI injection the patient has had a 100% improvement in pain and function for almost 2 months  9/19/2022 left SI injection the patient has had a 100% improvement in pain and function and the pain just came back  8/8/2022 left L3-5 MBB the patient has had a 70% improvement in pain and function  Caudal and bilateral L5-S1 interlaminar JULIO C by Lydia the patient has had helped with the legs numbness but never affected the lower back pain which is her major problem     Portions of record reviewed for pertinent issues: active problem list, medication list, allergies, family history, social history, notes from last encounter, encounters, lab results, imaging and  other system records.           I have personally reviewed the OARRS report for this patient. This report is scanned into the electronic medical record. I have considered the risks of abuse, dependence, addiction and diversion. It showed minimal hydrocodone 5 mg and lorazepam 1 mg HS from Heriberto Keller MD   OPIOID RISK ASSESSMENT SCORE 0/26  Aberrant behavior: None        Social history: The patient is retired, she has 3 children and 7 grandchildren, finished few college classes denies smoking drinking or use of illicit drugs     Diagnostic studies:  7/19/2022 cervical x-ray showed mild spondylotic changes with mild degeneration in the lower cervical spine     12/8/2021 EMG of the left leg was within normal limits     11/6/2021 MRI of the lumbar spine did not show any bone marrow edema or endplate changes, grade 1 spondylosis at the L4-5 but no canal stenosis no foraminal stenosis but significant facet hypertrophy and spondylosis worse at the L4-5 and L5-S1:  FINDINGS:  Mild levoscoliosis noted. Conus medullaris terminates at the level of  the L1 vertebral body. Visualized portions of the conus and the  spinal cord shows normal signal intensity and caliber.     Vertebral body heights are well maintained. Mild heterogeneous bone  marrow signal intensity is normal. Trace anterolisthesis of L4 on L5,  L3 on L4.     At T12-L1 trace retrolisthesis of T12 on L1, diffuse disc bulge, mild  facet joint arthropathy in combination noted causing mild central  spinal canal stenosis and minimal encroachment of the bilateral  foramina.     At L1-L2 mild disc bulge, facet joint arthropathy in combination  noted causing mild central spinal canal stenosis, and mild bilateral  neural foramina narrowing.     At L2-L3 mild disc bulge, facet joint arthropathy, ligamentum flavum  hypertrophy noted causing minimal encroachment of the spinal canal  and bilateral neural foramina.     At L3-L4 trace anterolisthesis of L3 on L4, minimal disc  bulge,  significant facet joint arthropathy, mild ligamentum flavum  hypertrophy, no significant central spinal canal or neural foramina  stenosis.     At L4-L5 grade 1 anterolisthesis of L4 on L5, significant facet joint  arthropathy noted causing mild encroachment of the spinal canal and  right neural foramina.     At L5-S1 mild facet joint arthropathy noted, mild disc bulge, without  any significant central spinal canal or neural foramina stenosis.     IMPRESSION:  There is mild degenerative spinal canal or neural foramina stenosis  at T12-L1, L1-L2, L2-L3.            Review of Systems   HENT: Negative.     Eyes: Negative.    Respiratory: Negative.     Cardiovascular: Negative.    Gastrointestinal: Negative.    Endocrine: Negative.    Genitourinary: Negative.    Musculoskeletal:  Positive for back pain and gait problem.   Skin: Negative.    Hematological: Negative.    Psychiatric/Behavioral: Negative.          Physical Exam  Vitals and nursing note reviewed.   Constitutional:       Appearance: Normal appearance.       HENT:      Head: Normocephalic and atraumatic.      Nose: Nose normal.   Eyes:      Extraocular Movements: Extraocular movements intact.      Conjunctiva/sclera: Conjunctivae normal.      Pupils: Pupils are equal, round, and reactive to light.   Cardiovascular:      Rate and Rhythm: Normal rate and regular rhythm.      Pulses: Normal pulses.      Heart sounds: Normal heart sounds.   Pulmonary:      Effort: Pulmonary effort is normal.      Breath sounds: Normal breath sounds.   Abdominal:      General: Abdomen is flat. Bowel sounds are normal.      Palpations: Abdomen is soft.   Musculoskeletal:         General: Tenderness present.   Skin:     General: Skin is warm.   Neurological:      General: No focal deficit present.      Mental Status: She is alert and oriented to person, place, and time.   Psychiatric:         Mood and Affect: Mood normal.         Behavior: Behavior normal.                       Plan  At least 50% of the visit was involved in the discussion of the options for treatment. We discussed exercises, medication, interventional therapies and surgery. Healthy life style is essential with patient hard work to achieve the wellness. In addition; discussion with the patient and/or family about any of the diagnostic results, impressions and/or recommended diagnostic studies, prognosis, risks and benefits of treatment options, instructions for treatment and/or follow-up, importance of compliance with chosen treatment options, risk-factor reduction, and patient/family education.         Recommended Pool therapy, walking in the pool, at least 3x per week for 30 minutes  Continue self-directed physical therapy with at least daily exercises for minimum of 20-minute, brochure was handed to the patient  Toradol 60 mg IM today in office  Prednisone taper dose 10 mg  MRI of the lumbar spine for the progression of the symptoms for possible referral to surgery or desiring new therapies  Healthy lifestyle and anti-inflammatory diet in addition to weight control discussed with the patient  Alternative chronic pain therapies was discussed, encouraged and information was handed  Return to Clinic after MRI     *Please note this report has been produced using speech recognition software and may contain errors related to that system including grammar, punctuation and spelling as well as words and phrases that may be inappropriate. If there are questions or concerns, please feel free to contact me to clarify.    Stacey Flaherty MD

## 2025-03-24 ENCOUNTER — OFFICE VISIT (OUTPATIENT)
Dept: PAIN MEDICINE | Facility: CLINIC | Age: 70
End: 2025-03-24
Payer: COMMERCIAL

## 2025-03-24 VITALS
BODY MASS INDEX: 30.32 KG/M2 | SYSTOLIC BLOOD PRESSURE: 140 MMHG | HEIGHT: 65 IN | DIASTOLIC BLOOD PRESSURE: 80 MMHG | WEIGHT: 182 LBS | OXYGEN SATURATION: 94 % | HEART RATE: 68 BPM | RESPIRATION RATE: 16 BRPM | TEMPERATURE: 97.3 F

## 2025-03-24 DIAGNOSIS — M47.27 LUMBOSACRAL SPONDYLOSIS WITH RADICULOPATHY: Primary | ICD-10-CM

## 2025-03-24 PROCEDURE — 1036F TOBACCO NON-USER: CPT | Performed by: ANESTHESIOLOGY

## 2025-03-24 PROCEDURE — 2500000004 HC RX 250 GENERAL PHARMACY W/ HCPCS (ALT 636 FOR OP/ED): Performed by: ANESTHESIOLOGY

## 2025-03-24 PROCEDURE — 1125F AMNT PAIN NOTED PAIN PRSNT: CPT | Performed by: ANESTHESIOLOGY

## 2025-03-24 PROCEDURE — 3008F BODY MASS INDEX DOCD: CPT | Performed by: ANESTHESIOLOGY

## 2025-03-24 PROCEDURE — 99214 OFFICE O/P EST MOD 30 MIN: CPT | Mod: 25 | Performed by: ANESTHESIOLOGY

## 2025-03-24 PROCEDURE — 1123F ACP DISCUSS/DSCN MKR DOCD: CPT | Performed by: ANESTHESIOLOGY

## 2025-03-24 PROCEDURE — 96372 THER/PROPH/DIAG INJ SC/IM: CPT | Performed by: ANESTHESIOLOGY

## 2025-03-24 PROCEDURE — 3079F DIAST BP 80-89 MM HG: CPT | Performed by: ANESTHESIOLOGY

## 2025-03-24 PROCEDURE — 1159F MED LIST DOCD IN RCRD: CPT | Performed by: ANESTHESIOLOGY

## 2025-03-24 PROCEDURE — 3077F SYST BP >= 140 MM HG: CPT | Performed by: ANESTHESIOLOGY

## 2025-03-24 PROCEDURE — 99214 OFFICE O/P EST MOD 30 MIN: CPT | Performed by: ANESTHESIOLOGY

## 2025-03-24 RX ORDER — KETOROLAC TROMETHAMINE 30 MG/ML
60 INJECTION, SOLUTION INTRAMUSCULAR; INTRAVENOUS ONCE
Status: COMPLETED | OUTPATIENT
Start: 2025-03-24 | End: 2025-03-24

## 2025-03-24 RX ORDER — PREDNISONE 10 MG/1
TABLET ORAL
Qty: 64 TABLET | Refills: 0 | Status: SHIPPED | OUTPATIENT
Start: 2025-03-24

## 2025-03-24 RX ADMIN — KETOROLAC TROMETHAMINE 60 MG: 30 INJECTION, SOLUTION INTRAMUSCULAR at 08:23

## 2025-03-24 ASSESSMENT — ENCOUNTER SYMPTOMS
DEPRESSION: 0
BACK PAIN: 1
RESPIRATORY NEGATIVE: 1
ENDOCRINE NEGATIVE: 1
EYES NEGATIVE: 1
GASTROINTESTINAL NEGATIVE: 1
HEMATOLOGIC/LYMPHATIC NEGATIVE: 1
LOSS OF SENSATION IN FEET: 0
PSYCHIATRIC NEGATIVE: 1
CARDIOVASCULAR NEGATIVE: 1
OCCASIONAL FEELINGS OF UNSTEADINESS: 0

## 2025-03-24 ASSESSMENT — PAIN SCALES - GENERAL: PAINLEVEL_OUTOF10: 8

## 2025-03-24 ASSESSMENT — PAIN - FUNCTIONAL ASSESSMENT: PAIN_FUNCTIONAL_ASSESSMENT: 0-10

## 2025-03-24 ASSESSMENT — PAIN DESCRIPTION - DESCRIPTORS: DESCRIPTORS: ACHING;THROBBING

## 2025-03-24 NOTE — PROGRESS NOTES
This is 69 y.o.  female with who has been treated for Sacroiliitis . Pain is 90% better,  lasting only six weeks .The pain is described as achiness and stabbing and is relieved by Medications hydrocodone prescribed by PCP, Patient states her pain located lower left buttock and down the front of her legs .   Here for follow-up   Chief Complaint   Patient presents with    Follow-up     6-8 week  follow up    Patient states she has been caring her her great grandson and those are days when she feels her pain .  6 to 8-week follow-up  Pain Therapies: left sacroiliac joint Injection 1/20/2025

## 2025-03-26 ENCOUNTER — HOSPITAL ENCOUNTER (OUTPATIENT)
Dept: RADIOLOGY | Facility: CLINIC | Age: 70
Discharge: HOME | End: 2025-03-26
Payer: COMMERCIAL

## 2025-03-26 VITALS — BODY MASS INDEX: 30.34 KG/M2 | WEIGHT: 182.1 LBS | HEIGHT: 65 IN

## 2025-03-26 DIAGNOSIS — N64.4 PAIN OF BOTH BREASTS: ICD-10-CM

## 2025-03-26 PROCEDURE — 77066 DX MAMMO INCL CAD BI: CPT

## 2025-04-09 ENCOUNTER — HOSPITAL ENCOUNTER (OUTPATIENT)
Dept: RADIOLOGY | Facility: HOSPITAL | Age: 70
Discharge: HOME | End: 2025-04-09
Payer: COMMERCIAL

## 2025-04-09 DIAGNOSIS — M47.27 LUMBOSACRAL SPONDYLOSIS WITH RADICULOPATHY: ICD-10-CM

## 2025-04-09 PROCEDURE — 72148 MRI LUMBAR SPINE W/O DYE: CPT | Performed by: RADIOLOGY

## 2025-04-09 PROCEDURE — 72148 MRI LUMBAR SPINE W/O DYE: CPT

## 2025-04-11 ENCOUNTER — TELEPHONE (OUTPATIENT)
Dept: PAIN MEDICINE | Facility: CLINIC | Age: 70
End: 2025-04-11
Payer: COMMERCIAL

## 2025-04-28 ENCOUNTER — PATIENT MESSAGE (OUTPATIENT)
Dept: ALLERGY | Facility: CLINIC | Age: 70
End: 2025-04-28
Payer: COMMERCIAL

## 2025-04-28 DIAGNOSIS — J30.1 SEASONAL ALLERGIC RHINITIS DUE TO POLLEN: ICD-10-CM

## 2025-04-28 DIAGNOSIS — H10.13 ALLERGIC CONJUNCTIVITIS OF BOTH EYES: Primary | ICD-10-CM

## 2025-04-28 RX ORDER — OLOPATADINE HYDROCHLORIDE 2 MG/ML
1 SOLUTION OPHTHALMIC DAILY PRN
Qty: 2.5 ML | Refills: 5 | Status: SHIPPED | OUTPATIENT
Start: 2025-04-28 | End: 2026-04-28

## 2025-05-06 ENCOUNTER — APPOINTMENT (OUTPATIENT)
Dept: PRIMARY CARE | Facility: CLINIC | Age: 70
End: 2025-05-06
Payer: COMMERCIAL

## 2025-05-06 VITALS
SYSTOLIC BLOOD PRESSURE: 121 MMHG | HEIGHT: 65 IN | BODY MASS INDEX: 30.16 KG/M2 | DIASTOLIC BLOOD PRESSURE: 81 MMHG | HEART RATE: 74 BPM | WEIGHT: 181 LBS | TEMPERATURE: 97.9 F | OXYGEN SATURATION: 93 %

## 2025-05-06 DIAGNOSIS — M54.50 LOW BACK PAIN, UNSPECIFIED BACK PAIN LATERALITY, UNSPECIFIED CHRONICITY, UNSPECIFIED WHETHER SCIATICA PRESENT: ICD-10-CM

## 2025-05-06 DIAGNOSIS — J06.9 VIRAL URI: ICD-10-CM

## 2025-05-06 DIAGNOSIS — F41.1 GENERALIZED ANXIETY DISORDER: Primary | ICD-10-CM

## 2025-05-06 PROCEDURE — 3074F SYST BP LT 130 MM HG: CPT | Performed by: INTERNAL MEDICINE

## 2025-05-06 PROCEDURE — 1036F TOBACCO NON-USER: CPT | Performed by: INTERNAL MEDICINE

## 2025-05-06 PROCEDURE — 3079F DIAST BP 80-89 MM HG: CPT | Performed by: INTERNAL MEDICINE

## 2025-05-06 PROCEDURE — 99213 OFFICE O/P EST LOW 20 MIN: CPT | Performed by: INTERNAL MEDICINE

## 2025-05-06 PROCEDURE — 3008F BODY MASS INDEX DOCD: CPT | Performed by: INTERNAL MEDICINE

## 2025-05-06 PROCEDURE — 1123F ACP DISCUSS/DSCN MKR DOCD: CPT | Performed by: INTERNAL MEDICINE

## 2025-05-06 PROCEDURE — 1159F MED LIST DOCD IN RCRD: CPT | Performed by: INTERNAL MEDICINE

## 2025-05-06 NOTE — PROGRESS NOTES
"PCP: Heriberto Keller MD   I have reviewed existing histories, notes, past medical history, surgical history, social history, family history, med list, allergy list, and immunization, and updated.    HPI:   Routine Follow up. She takes benzo and hydrocodone occasionally.  Her back is feeling better, after steroid injection.  She does not need any refills today    Lab Results   Component Value Date    WBC 7.3 12/23/2024    HGB 13.3 12/23/2024    HCT 40.7 12/23/2024     12/23/2024    CHOL 238 (H) 10/02/2024    TRIG 147 10/02/2024    HDL 54.4 10/02/2024    ALT 16 10/02/2024    AST 13 10/02/2024     10/02/2024    K 4.5 10/02/2024     10/02/2024    CREATININE 0.97 10/02/2024    BUN 18 10/02/2024    CO2 29 10/02/2024    TSH 2.22 07/10/2023    INR 1.0 12/23/2024    HGBA1C 5.9 (H) 10/02/2024     Physical exam:  /81   Pulse 74   Temp 36.6 °C (97.9 °F)   Ht 1.657 m (5' 5.25\")   Wt 82.1 kg (181 lb)   LMP  (LMP Unknown)   SpO2 93%   BMI 29.89 kg/m²    Nasal congestion  TM normal  Throat slightly red  No tonsillitis  No Neck gland swelling  Heart exam showed normal heart sounds, no murmur, clicks, gallops or rubs. Regular rate and rhythm. Chest is clear; no wheezes or rales.     Assessments/orders:   Assessment & Plan  Generalized anxiety disorder         Low back pain, unspecified back pain laterality, unspecified chronicity, unspecified whether sciatica present         Viral URI  Continue supportive care         OARRS:  No data recorded  I have personally reviewed the OARRS report for Ac Torres. I have considered the risks of abuse, dependence, addiction and diversion    Is the patient prescribed a combination of a benzodiazepine and opioid?  Yes, I feel it is clincially indicated to continue the medication and have discussed with the patient risks/benefits/alternatives.    Last Urine Drug Screen / ordered today: Yes  Recent Results (from the past 8760 hours)   Confirmation " Opiate/Opioid/Benzo Prescription Compliance    Collection Time: 07/10/24  9:24 AM   Result Value Ref Range    Clonazepam <25 <25 ng/mL    7-Aminoclonazepam <25 <25 ng/mL    Alprazolam <25 <25 ng/mL    Alpha-Hydroxyalprazolam <25 <25 ng/mL    Midazolam <25 <25 ng/mL    Alpha-Hydroxymidazolam <25 <25 ng/mL    Chlordiazepoxide <25 <25 ng/mL    Diazepam <25 <25 ng/mL    Nordiazepam <25 <25 ng/mL    Temazepam <25 <25 ng/mL    Oxazepam <25 <25 ng/mL    Lorazepam 333 (H) <25 ng/mL    Methadone <25 <25 ng/mL    EDDP <25 <25 ng/mL    6-Acetylmorphine <25 <25 ng/mL    Codeine <50 <50 ng/mL    Hydrocodone 143 (H) <25 ng/mL    Hydromorphone 93 (H) <25 ng/mL    Morphine  <50 <50 ng/mL    Norhydrocodone 745 (H) <25 ng/mL    Noroxycodone <25 <25 ng/mL    Oxycodone <25 <25 ng/mL    Oxymorphone <25 <25 ng/mL    Fentanyl <2.5 <2.5 ng/mL    Norfentanyl <2.5 <2.5 ng/mL    Tramadol <50 <50 ng/mL    O-Desmethyltramadol <50 <50 ng/mL    Zolpidem <25 <25 ng/mL    Zolpidem Metabolite (ZCA) <25 <25 ng/mL   Screen Opiate/Opioid/Benzo Prescription Compliance    Collection Time: 07/10/24  9:24 AM   Result Value Ref Range    Creatinine, Urine Random 294.7 20.0 - 320.0 mg/dL    Amphetamine Screen, Urine Presumptive Negative Presumptive Negative    Barbiturate Screen, Urine Presumptive Negative Presumptive Negative    Cannabinoid Screen, Urine Presumptive Negative Presumptive Negative    Cocaine Metabolite Screen, Urine Presumptive Negative Presumptive Negative    PCP Screen, Urine Presumptive Negative Presumptive Negative     Results are as expected.         Controlled Substance Agreement:  Date of the Last Agreement: 7/2024  Reviewed Controlled Substance Agreement including but not limited to the benefits, risks, and alternatives to treatment with a Controlled Substance medication(s).    Opioids:  What is the patient's goal of therapy? Chronic low back pain  Is this being achieved with current treatment?  yes    I have calculated the patient's  Morphine Dose Equivalent (MED):   I have considered referral to Pain Management and/or a specialist, and do not feel it is necessary at this time.    I feel that it is clinically indicated to continue this current medication regimen after consideration of alternative therapies, and other non-opioid treatment.    Opioid Risk Screening:  Family History of Substance Abuse  Alcohol: 0 (2024  9:00 AM)  Illegal Dru (2024  9:00 AM)  Prescription Dru (2024  9:00 AM)    Personal History of Substance Abuse  Alcohol: 0 (2024  9:00 AM)  Illegal Drugs: 0 (2024  9:00 AM)  Prescription Drugs: 0 (2024  9:00 AM)    Patient Age (16-45)  Age (16-45): 0 (2024  9:00 AM)    History of Preadolescent Sexual Abuse  History of Preadolescent Sexual Abuse: 0 (2024  9:00 AM)    Psychological Disease  Attention Deficit Disorder, Obsessive Compulsive Disorder, Bipolar, Schizophrenia: 0 (2024  9:00 AM)  Depression: 0 (2024  9:00 AM)    Total Score  Total Score: 0 (2024  9:00 AM)    Total Score Risk Category  TOTAL SCORE CATEGORY: Low Risk (0-3) (2024  9:00 AM)       Benzodiazepines:  What is the patient's goal of therapy? Anxiety   Is this being achieved with current treatment? yes    Activities of Daily Living:   Is your overall impression that this patient is benefiting (symptom reduction outweighs side effects) from benzodiazepine therapy? Yes     1. Physical Functioning: Better  2. Family Relationship: Better  3. Social Relationship: Better  4. Mood: Better  5. Sleep Patterns: Better  6. Overall Function: Better

## 2025-05-21 ENCOUNTER — HOSPITAL ENCOUNTER (OUTPATIENT)
Dept: RADIOLOGY | Facility: HOSPITAL | Age: 70
Discharge: HOME | End: 2025-05-21
Payer: COMMERCIAL

## 2025-05-21 ENCOUNTER — OFFICE VISIT (OUTPATIENT)
Dept: ORTHOPEDIC SURGERY | Facility: HOSPITAL | Age: 70
End: 2025-05-21
Payer: COMMERCIAL

## 2025-05-21 VITALS — BODY MASS INDEX: 29.09 KG/M2 | HEIGHT: 66 IN | WEIGHT: 181 LBS

## 2025-05-21 DIAGNOSIS — M25.561 ACUTE PAIN OF RIGHT KNEE: ICD-10-CM

## 2025-05-21 DIAGNOSIS — S80.01XA CONTUSION OF RIGHT KNEE, INITIAL ENCOUNTER: Primary | ICD-10-CM

## 2025-05-21 PROCEDURE — 73564 X-RAY EXAM KNEE 4 OR MORE: CPT | Mod: RT

## 2025-05-21 PROCEDURE — 3008F BODY MASS INDEX DOCD: CPT | Performed by: PHYSICIAN ASSISTANT

## 2025-05-21 PROCEDURE — 99214 OFFICE O/P EST MOD 30 MIN: CPT | Performed by: PHYSICIAN ASSISTANT

## 2025-05-21 PROCEDURE — 1036F TOBACCO NON-USER: CPT | Performed by: PHYSICIAN ASSISTANT

## 2025-05-21 PROCEDURE — 1160F RVW MEDS BY RX/DR IN RCRD: CPT | Performed by: PHYSICIAN ASSISTANT

## 2025-05-21 PROCEDURE — 73564 X-RAY EXAM KNEE 4 OR MORE: CPT | Mod: RIGHT SIDE | Performed by: RADIOLOGY

## 2025-05-21 PROCEDURE — 1159F MED LIST DOCD IN RCRD: CPT | Performed by: PHYSICIAN ASSISTANT

## 2025-05-21 PROCEDURE — 1125F AMNT PAIN NOTED PAIN PRSNT: CPT | Performed by: PHYSICIAN ASSISTANT

## 2025-05-21 ASSESSMENT — PAIN SCALES - GENERAL: PAINLEVEL_OUTOF10: 8

## 2025-05-21 ASSESSMENT — PAIN DESCRIPTION - DESCRIPTORS: DESCRIPTORS: SHARP;ACHING;STABBING

## 2025-05-21 ASSESSMENT — PAIN - FUNCTIONAL ASSESSMENT: PAIN_FUNCTIONAL_ASSESSMENT: 0-10

## 2025-05-21 NOTE — PROGRESS NOTES
"Subjective    Patient ID: Ac Dennison\" is a 69 y.o. female.    Chief Complaint: Pain of the Right Knee           HPI:  Ac Kingston" is a 69 y.o. female who presents to the orthopedic walk-in clinic for complaint of right knee pain.  2 days ago she was trying to run away from a bee when she fell forward landing on both knees.  She reports sharp pain over the anterior aspect of her right knee.  Symptoms have made it difficult for her to walk.  She has been using ice and Tylenol.  She does note a history of arthritis in her right knee but currently states that her symptoms do not feel deep or inside the joint.    ROS  Constitutional: No fever, no chills, not feeling tired, no recent weight gain and no recent weight loss  ENT: No nosebleeds  Cardiovascular: No chest pain  Respiratory: No shortness of breath and no cough  Gastrointestinal: No abdominal pain, no nausea, no diarrhea, and no vomiting  Musculoskeletal: No arthralgias  Integumentary: No rashes and no skin lesions  Neurological: No headache  Psychiatric: No sleep disturbances no depression  Endocrine: No muscle weakness and no muscle cramps  Hematologic/lymphatic: No swelling glands and no tendency for easy bruising    Medical History[1]     Surgical History[2]     Current Medications[3]     RX Allergies[4]     Social Connections: Not on file          Objective   69-year-old female well appearing in no acute distress. Alert and oriented ×3.  Skin intact bilateral lower extremities.   Slightly antalgic gait. Coordination and balance intact.  Bilateral lower extremity compartments supple.  5 out of 5 distal motor strength bilaterally.  L4 through S1 sensation intact bilaterally.  2+ DP/PT pulses bilaterally.  Both knees have small abrasions over the anterior aspect.  No signs of infection.  No joint effusion in either knee.  She can form an isometric quad contraction and straight leg raise on the right.  Active range of motion 0 to 90 " degrees.  No tenderness along the medial or lateral joint line.  Tenderness anteriorly along the patella.    Image Results:  X-rays of the right knee taken today in the office were reviewed.  These were negative for fracture or dislocation.  Advanced tricompartmental degenerative changes.      Assessment/Plan   Encounter Diagnoses:  Contusion of right knee, initial encounter    Acute pain of right knee    Orders Placed This Encounter    XR knee right 4+ views       While she does have arthritis on x-ray I do not believe this is currently symptomatic for her.  We discussed that when she fell she injured the soft tissues on the anterior portion of her knee.  She can continue with ice and Tylenol.  She may apply a small amount of topical diclofenac but try to avoid getting it into the abrasions.  If she starts having significant pain deep inside the joint at any point she will return back to the office to see Dr. Henderson whom she has seen in the past.    This office note was dictated using Dragon voice to text software and was not proofread for spelling or grammatical errors       [1]   Past Medical History:  Diagnosis Date    Abnormal ECG 2023?    Anxiety 90's    Arrhythmia 1990's?    Arthritis 2020    Cervical disc disease 2007?    Cervical disc disorder 2009?    Chronic bronchitis (Multi) 2010    Usually my cold symptoms go into bronchitis    Chronic pain disorder 2009    GERD (gastroesophageal reflux disease)     HLD (hyperlipidemia)     Low back pain 2009?    Lumbosacral disc disease 2009?    Neck pain 2009    Osteoarthritis     Palpitations     Personal history of other endocrine, nutritional and metabolic disease 07/01/2022    History of hyperlipidemia    Personal history of other medical treatment 01/19/2021    History of screening mammography    Spinal stenosis 2009   [2]   Past Surgical History:  Procedure Laterality Date    ABLATION OF DYSRHYTHMIC FOCUS  02/94    CARDIAC CATHETERIZATION  2023    EPIDURAL BLOCK  INJECTION  2010?    KNEE Right     ORTHOPEDIC SURGERY  1998    OTHER SURGICAL HISTORY  03/08/2021    Laparoscopy    ROTATOR CUFF REPAIR Bilateral     ULNAR NERVE REPAIR     [3]   Current Outpatient Medications:     azelastine (Astelin) 137 mcg (0.1 %) nasal spray, Administer 2 sprays into each nostril 2 times a day as needed for rhinitis. Use in each nostril as directed, Disp: 30 mL, Rfl: 11    calcium acetate 668 mg (169 mg calcium) tablet, Take by mouth once daily., Disp: , Rfl:     cholecalciferol (Vitamin D-3) 25 MCG (1000 UT) tablet, Take 1 tablet (1,000 Units) by mouth once daily., Disp: , Rfl:     clindamycin (Cleocin T) 1 % lotion, , Disp: , Rfl:     clobetasol (Temovate) 0.05 % cream, Apply topically 2 times a day., Disp: , Rfl:     desonide (DesOwen) 0.05 % ointment, , Disp: , Rfl:     fluticasone (Flonase) 50 mcg/actuation nasal spray, Administer 2 sprays into each nostril once daily. Shake gently. Before first use, prime pump. After use, clean tip and replace cap., Disp: 16 g, Rfl: 11    fluticasone propion-salmeteroL (Advair Diskus) 250-50 mcg/dose diskus inhaler, Inhale 1 puff 2 times a day. Rinse mouth with water after use to reduce aftertaste and incidence of candidiasis. Do not swallow., Disp: 60 each, Rfl: 2    hydroCHLOROthiazide (HYDRODiuril) 25 mg tablet, Take 1 tablet (25 mg) by mouth once daily., Disp: 90 tablet, Rfl: 3    HYDROcodone-acetaminophen (Norco) 5-325 mg tablet, Take 1 tablet by mouth every 6 hours if needed for severe pain (7 - 10)., Disp: 60 tablet, Rfl: 0    levocetirizine (Xyzal) 5 mg tablet, Take 1 tablet (5 mg) by mouth once daily., Disp: 30 tablet, Rfl: 11    LORazepam (Ativan) 1 mg tablet, Take 1 tablet (1 mg) by mouth once daily as needed for anxiety., Disp: 30 tablet, Rfl: 0    metoprolol succinate XL (Toprol-XL) 25 mg 24 hr tablet, Take 1 tablet (25 mg) by mouth once daily. Do not crush or chew., Disp: 90 tablet, Rfl: 3    naloxone (Narcan) 4 mg/0.1 mL nasal spray,  Administer 1 spray (4 mg) into affected nostril(s) if needed. In one nostril and call 911, Disp: , Rfl:     nitroglycerin (Nitrostat) 0.4 mg SL tablet, Place 1 tablet (0.4 mg) under the tongue every 5 minutes if needed for chest pain. May repeat dose every 5 minutes for up to 3 doses total., Disp: 100 tablet, Rfl: 11    olopatadine (Pataday) 0.2 % ophthalmic solution, Administer 1 drop into affected eye(s) once daily as needed for allergies (itchy eyes)., Disp: 2.5 mL, Rfl: 5    omeprazole (PriLOSEC) 20 mg DR capsule, TAKE 1 CAPSULE BY MOUTH EVERY DAY, Disp: 90 capsule, Rfl: 3    pravastatin (Pravachol) 40 mg tablet, Take 1 tablet (40 mg) by mouth once daily at bedtime., Disp: 90 tablet, Rfl: 3  [4]   Allergies  Allergen Reactions    Amoxicillin-Pot Clavulanate Diarrhea    Atorvastatin Other     Muscle aches    Latex Unknown     Blisters    Rosuvastatin Unknown    Topiramate Hallucinations    Meloxicam Palpitations    Nortriptyline Rash    Pseudoephedrine Hcl Palpitations

## 2025-05-27 ENCOUNTER — PATIENT MESSAGE (OUTPATIENT)
Dept: PRIMARY CARE | Facility: CLINIC | Age: 70
End: 2025-05-27
Payer: COMMERCIAL

## 2025-05-27 DIAGNOSIS — M54.50 LOW BACK PAIN, UNSPECIFIED BACK PAIN LATERALITY, UNSPECIFIED CHRONICITY, UNSPECIFIED WHETHER SCIATICA PRESENT: ICD-10-CM

## 2025-05-27 RX ORDER — HYDROCODONE BITARTRATE AND ACETAMINOPHEN 5; 325 MG/1; MG/1
1 TABLET ORAL EVERY 6 HOURS PRN
Qty: 60 TABLET | Refills: 0 | Status: SHIPPED | OUTPATIENT
Start: 2025-05-27

## 2025-06-04 DIAGNOSIS — M46.1 SACROILIITIS: Primary | ICD-10-CM

## 2025-06-04 RX ORDER — LORAZEPAM 2 MG/1
TABLET ORAL
Qty: 1 TABLET | Refills: 0 | Status: SHIPPED | OUTPATIENT
Start: 2025-06-04

## 2025-06-04 NOTE — H&P (VIEW-ONLY)
Orders Placed This Encounter   Procedures    FL pain management     Standing Status:   Future     Expected Date:   6/4/2025     Expiration Date:   6/4/2026     Release result to [employer]:   Immediate [1]

## 2025-06-04 NOTE — PROGRESS NOTES
Orders Placed This Encounter   Procedures    FL pain management     Standing Status:   Future     Expected Date:   6/4/2025     Expiration Date:   6/4/2026     Release result to [employer]:   Immediate [1]

## 2025-06-16 ENCOUNTER — HOSPITAL ENCOUNTER (OUTPATIENT)
Dept: PAIN MEDICINE | Facility: CLINIC | Age: 70
Discharge: HOME | End: 2025-06-16
Payer: COMMERCIAL

## 2025-06-16 VITALS
OXYGEN SATURATION: 98 % | RESPIRATION RATE: 16 BRPM | BODY MASS INDEX: 29.09 KG/M2 | HEIGHT: 66 IN | TEMPERATURE: 97.3 F | WEIGHT: 181 LBS | HEART RATE: 87 BPM

## 2025-06-16 DIAGNOSIS — M46.1 SACROILIITIS: ICD-10-CM

## 2025-06-16 PROCEDURE — 2500000004 HC RX 250 GENERAL PHARMACY W/ HCPCS (ALT 636 FOR OP/ED): Performed by: ANESTHESIOLOGY

## 2025-06-16 PROCEDURE — 27096 INJECT SACROILIAC JOINT: CPT | Performed by: ANESTHESIOLOGY

## 2025-06-16 PROCEDURE — 2550000001 HC RX 255 CONTRASTS: Performed by: ANESTHESIOLOGY

## 2025-06-16 PROCEDURE — 27096 INJECT SACROILIAC JOINT: CPT | Mod: 50 | Performed by: ANESTHESIOLOGY

## 2025-06-16 RX ORDER — TRIAMCINOLONE ACETONIDE 40 MG/ML
INJECTION, SUSPENSION INTRA-ARTICULAR; INTRAMUSCULAR AS NEEDED
Status: COMPLETED | OUTPATIENT
Start: 2025-06-16 | End: 2025-06-16

## 2025-06-16 RX ORDER — LIDOCAINE HYDROCHLORIDE 5 MG/ML
INJECTION, SOLUTION INFILTRATION; INTRAVENOUS AS NEEDED
Status: COMPLETED | OUTPATIENT
Start: 2025-06-16 | End: 2025-06-16

## 2025-06-16 RX ORDER — BUPIVACAINE HYDROCHLORIDE 7.5 MG/ML
INJECTION, SOLUTION EPIDURAL; RETROBULBAR AS NEEDED
Status: COMPLETED | OUTPATIENT
Start: 2025-06-16 | End: 2025-06-16

## 2025-06-16 RX ADMIN — TRIAMCINOLONE ACETONIDE 40 MG: 40 INJECTION, SUSPENSION INTRA-ARTICULAR; INTRAMUSCULAR at 11:42

## 2025-06-16 RX ADMIN — BUPIVACAINE HYDROCHLORIDE 10 ML: 7.5 INJECTION, SOLUTION EPIDURAL; RETROBULBAR at 11:41

## 2025-06-16 RX ADMIN — LIDOCAINE HYDROCHLORIDE 10 ML: 5 INJECTION, SOLUTION INFILTRATION at 11:41

## 2025-06-16 RX ADMIN — IOHEXOL 3 ML: 300 INJECTION, SOLUTION INTRAVENOUS at 11:41

## 2025-06-16 ASSESSMENT — PAIN DESCRIPTION - DESCRIPTORS: DESCRIPTORS: ACHING;THROBBING;TINGLING

## 2025-06-16 ASSESSMENT — PAIN - FUNCTIONAL ASSESSMENT: PAIN_FUNCTIONAL_ASSESSMENT: 0-10

## 2025-06-16 ASSESSMENT — PAIN SCALES - GENERAL
PAINLEVEL_OUTOF10: 0 - NO PAIN
PAINLEVEL_OUTOF10: 8

## 2025-06-25 ENCOUNTER — PATIENT MESSAGE (OUTPATIENT)
Dept: PRIMARY CARE | Facility: CLINIC | Age: 70
End: 2025-06-25
Payer: COMMERCIAL

## 2025-06-25 DIAGNOSIS — M62.830 MUSCLE SPASM OF BACK: Primary | ICD-10-CM

## 2025-06-26 RX ORDER — METHOCARBAMOL 500 MG/1
500 TABLET, FILM COATED ORAL 4 TIMES DAILY PRN
Qty: 40 TABLET | Refills: 0 | Status: SHIPPED | OUTPATIENT
Start: 2025-06-26 | End: 2025-08-25

## 2025-07-09 ENCOUNTER — PATIENT MESSAGE (OUTPATIENT)
Dept: PRIMARY CARE | Facility: CLINIC | Age: 70
End: 2025-07-09
Payer: COMMERCIAL

## 2025-07-09 DIAGNOSIS — F41.1 GENERALIZED ANXIETY DISORDER: ICD-10-CM

## 2025-07-09 RX ORDER — LORAZEPAM 1 MG/1
1 TABLET ORAL DAILY PRN
Qty: 30 TABLET | Refills: 0 | Status: SHIPPED | OUTPATIENT
Start: 2025-07-09 | End: 2026-01-05

## 2025-07-16 ENCOUNTER — APPOINTMENT (OUTPATIENT)
Dept: PRIMARY CARE | Facility: CLINIC | Age: 70
End: 2025-07-16
Payer: COMMERCIAL

## 2025-07-16 VITALS
BODY MASS INDEX: 29.89 KG/M2 | OXYGEN SATURATION: 97 % | HEIGHT: 66 IN | HEART RATE: 82 BPM | SYSTOLIC BLOOD PRESSURE: 144 MMHG | TEMPERATURE: 97.7 F | DIASTOLIC BLOOD PRESSURE: 82 MMHG | WEIGHT: 186 LBS

## 2025-07-16 DIAGNOSIS — Z00.00 MEDICARE ANNUAL WELLNESS VISIT, SUBSEQUENT: Primary | ICD-10-CM

## 2025-07-16 DIAGNOSIS — E78.2 MIXED HYPERLIPIDEMIA: ICD-10-CM

## 2025-07-16 DIAGNOSIS — F41.1 GENERALIZED ANXIETY DISORDER: ICD-10-CM

## 2025-07-16 DIAGNOSIS — Z00.00 PHYSICAL EXAM: ICD-10-CM

## 2025-07-16 DIAGNOSIS — G89.29 CHRONIC LOW BACK PAIN WITH SCIATICA, SCIATICA LATERALITY UNSPECIFIED, UNSPECIFIED BACK PAIN LATERALITY: ICD-10-CM

## 2025-07-16 DIAGNOSIS — E78.5 HYPERLIPIDEMIA, UNSPECIFIED: ICD-10-CM

## 2025-07-16 DIAGNOSIS — Z78.0 MENOPAUSE: ICD-10-CM

## 2025-07-16 DIAGNOSIS — M54.40 CHRONIC LOW BACK PAIN WITH SCIATICA, SCIATICA LATERALITY UNSPECIFIED, UNSPECIFIED BACK PAIN LATERALITY: ICD-10-CM

## 2025-07-16 DIAGNOSIS — R73.03 PRE-DIABETES: ICD-10-CM

## 2025-07-16 DIAGNOSIS — I10 PRIMARY HYPERTENSION: ICD-10-CM

## 2025-07-16 DIAGNOSIS — N18.31 STAGE 3A CHRONIC KIDNEY DISEASE (MULTI): ICD-10-CM

## 2025-07-16 DIAGNOSIS — Z79.899 HIGH RISK MEDICATION USE: ICD-10-CM

## 2025-07-16 PROCEDURE — 1170F FXNL STATUS ASSESSED: CPT | Performed by: INTERNAL MEDICINE

## 2025-07-16 PROCEDURE — 3079F DIAST BP 80-89 MM HG: CPT | Performed by: INTERNAL MEDICINE

## 2025-07-16 PROCEDURE — 1036F TOBACCO NON-USER: CPT | Performed by: INTERNAL MEDICINE

## 2025-07-16 PROCEDURE — 99397 PER PM REEVAL EST PAT 65+ YR: CPT | Performed by: INTERNAL MEDICINE

## 2025-07-16 PROCEDURE — 3077F SYST BP >= 140 MM HG: CPT | Performed by: INTERNAL MEDICINE

## 2025-07-16 PROCEDURE — G0439 PPPS, SUBSEQ VISIT: HCPCS | Performed by: INTERNAL MEDICINE

## 2025-07-16 PROCEDURE — 3008F BODY MASS INDEX DOCD: CPT | Performed by: INTERNAL MEDICINE

## 2025-07-16 PROCEDURE — 1159F MED LIST DOCD IN RCRD: CPT | Performed by: INTERNAL MEDICINE

## 2025-07-16 RX ORDER — PRAVASTATIN SODIUM 40 MG/1
40 TABLET ORAL NIGHTLY
Qty: 90 TABLET | Refills: 3 | Status: SHIPPED | OUTPATIENT
Start: 2025-07-16 | End: 2026-07-16

## 2025-07-16 ASSESSMENT — PATIENT HEALTH QUESTIONNAIRE - PHQ9
2. FEELING DOWN, DEPRESSED OR HOPELESS: NOT AT ALL
3. TROUBLE FALLING OR STAYING ASLEEP OR SLEEPING TOO MUCH: SEVERAL DAYS
1. LITTLE INTEREST OR PLEASURE IN DOING THINGS: NOT AT ALL
SUM OF ALL RESPONSES TO PHQ QUESTIONS 1-9: 1
9. THOUGHTS THAT YOU WOULD BE BETTER OFF DEAD, OR OF HURTING YOURSELF: NOT AT ALL
8. MOVING OR SPEAKING SO SLOWLY THAT OTHER PEOPLE COULD HAVE NOTICED. OR THE OPPOSITE, BEING SO FIGETY OR RESTLESS THAT YOU HAVE BEEN MOVING AROUND A LOT MORE THAN USUAL: NOT AT ALL
7. TROUBLE CONCENTRATING ON THINGS, SUCH AS READING THE NEWSPAPER OR WATCHING TELEVISION: NOT AT ALL
SUM OF ALL RESPONSES TO PHQ9 QUESTIONS 1 AND 2: 0
5. POOR APPETITE OR OVEREATING: NOT AT ALL
4. FEELING TIRED OR HAVING LITTLE ENERGY: NOT AT ALL
6. FEELING BAD ABOUT YOURSELF - OR THAT YOU ARE A FAILURE OR HAVE LET YOURSELF OR YOUR FAMILY DOWN: NOT AT ALL

## 2025-07-16 ASSESSMENT — ACTIVITIES OF DAILY LIVING (ADL)
MANAGING_FINANCES: INDEPENDENT
DOING_HOUSEWORK: INDEPENDENT
TAKING_MEDICATION: INDEPENDENT
DRESSING: INDEPENDENT
GROCERY_SHOPPING: INDEPENDENT
BATHING: INDEPENDENT

## 2025-07-16 NOTE — PROGRESS NOTES
"Heriberto Keller MD  SUBJECTIVE:     Ac Torres \"Heydi\" is a 70 y.o. female presenting for her annual physical/wellness.  Doing well.  No specific concerns.    Colon screening: had Colonoscopy at [facility] in 2018, good for 10 years per pt. To repeat in 2028  Last pap: na  Last mammogram: Up to date   Dexa due, two years ago osteopenia   Vaccines Up to date for tdap, shingrix, prevnar  Diet:  healthy in general  Exercises:  regularly  Lab Results   Component Value Date    HGBA1C 5.9 (H) 10/02/2024     Lab Results   Component Value Date    CREATININE 0.97 10/02/2024     Lab Results   Component Value Date    WBC 7.3 12/23/2024    HGB 13.3 12/23/2024    HCT 40.7 12/23/2024    MCV 86 12/23/2024     12/23/2024     Lab Results   Component Value Date    CHOL 238 (H) 10/02/2024    CHOL 214 (H) 11/02/2023    CHOL 241 (H) 07/10/2023     Lab Results   Component Value Date    HDL 54.4 10/02/2024    HDL 43.6 11/02/2023    HDL 49.6 07/10/2023     Lab Results   Component Value Date    LDLCALC 154 (H) 10/02/2024    LDLCALC 141 (H) 11/02/2023     Lab Results   Component Value Date    TRIG 147 10/02/2024    TRIG 148 11/02/2023    TRIG 178 (H) 07/10/2023     No components found for: \"CHOLHDL\"       ROS:   Feeling well. No dyspnea or chest pain on exertion. No abdominal pain, change in bowel habits, black or bloody stools. No urinary tract or  symptoms.  No breast concerns. No neurological complaints.    OBJECTIVE:   The patient appears well, alert, oriented x 3, in no distress.   /82   Pulse 82   Temp 36.5 °C (97.7 °F)   Ht 1.664 m (5' 5.5\")   Wt 84.4 kg (186 lb)   LMP  (LMP Unknown)   SpO2 97%   BMI 30.48 kg/m²   ENT normal.  Neck supple. No adenopathy or thyromegaly. RADHA. Lungs are clear, good air entry, no wheezes, rhonchi or rales. S1 and S2 normal, no murmurs, regular rate and rhythm. Abdomen is soft without tenderness, guarding, mass or organomegaly.  exam deferred to Gyn. Extremities show no " edema, normal peripheral pulses. Neurological is normal without focal findings.    Assessment & Plan  Medicare annual wellness visit, subsequent         Physical exam         Pre-diabetes    Orders:    Hemoglobin A1C; Future    Comprehensive Metabolic Panel; Future    Mixed hyperlipidemia    Orders:    Lipid Panel; Future    Comprehensive Metabolic Panel; Future    Primary hypertension  Suboptimally controlled. She currently takes hydrochlorothiazide for leg edema, occasionally. I asked her start taking half tab every day for htn. She does not want to take whole tab per day because of urinary frequency from that. She has Follow up with her cardiologist next month    Orders:    Comprehensive Metabolic Panel; Future    Chronic low back pain with sciatica, sciatica laterality unspecified, unspecified back pain laterality         Generalized anxiety disorder         Stage 3a chronic kidney disease (Multi)         Hyperlipidemia, unspecified    Orders:    pravastatin (Pravachol) 40 mg tablet; Take 1 tablet (40 mg) by mouth once daily at bedtime.    Menopause    Orders:    XR DEXA bone density; Future         Medicare Wellness Billing Compliance Satisfied    *This is a visual tool to show completion of required items on the day of the visit. Green checks will only appear on the date of visit.    Review all medications by prescribing practitioner or clinical pharmacist (such as prescriptions, OTCs, herbal therapies and supplements) documented in the medical record    Past Medical, Surgical, and Family History reviewed and updated in chart    Tobacco Use Reviewed    Alcohol Use Reviewed    Illicit Drug Use Reviewed    PHQ2/9    Falls in Last Year Reviewed    Home Safety Risk Factors Reviewed    Cognitive Impairment Reviewed    Patient Self Assessment and Health Status    Current Diet Reviewed    Exercise Frequency    ADL - Hearing Impairment    ADL - Bathing    ADL - Dressing    ADL - Walks in Home    IADL -  Managing Finances    IADL - Grocery Shopping    IADL - Taking Medications    IADL - Doing Housework

## 2025-07-16 NOTE — ASSESSMENT & PLAN NOTE
Suboptimally controlled. She currently takes hydrochlorothiazide for leg edema, occasionally. I asked her start taking half tab every day for htn. She does not want to take whole tab per day because of urinary frequency from that. She has Follow up with her cardiologist next month    Orders:    Comprehensive Metabolic Panel; Future

## 2025-07-19 LAB
AMPHETAMINES UR QL: NEGATIVE NG/ML
BARBITURATES UR QL: NEGATIVE NG/ML
BENZODIAZ UR QL: NEGATIVE NG/ML
BZE UR QL: NEGATIVE NG/ML
CODEINE UR-MCNC: NEGATIVE NG/ML
CREAT UR-MCNC: 101.5 MG/DL
DRUG SCREEN COMMENT UR-IMP: ABNORMAL
DRUG SCREEN COMMENT UR-IMP: NORMAL
FENTANYL UR QL SCN: NEGATIVE NG/ML
HYDROCODONE UR-MCNC: 61 NG/ML
HYDROMORPHONE UR-MCNC: NEGATIVE NG/ML
METHADONE UR QL: NEGATIVE NG/ML
MORPHINE UR-MCNC: NEGATIVE NG/ML
NORHYDROCODONE UR CFM-MCNC: 271 NG/ML
NORTRAMADOL UR-MCNC: NEGATIVE NG/ML
OPIATES UR QL: POSITIVE NG/ML
OXIDANTS UR QL: NEGATIVE MCG/ML
OXYCODONE UR QL: NEGATIVE NG/ML
PCP UR QL: NEGATIVE NG/ML
PH UR: 7.2 [PH] (ref 4.5–9)
QUEST 6 ACETYLMORPHINE: NORMAL
QUEST NOTES AND COMMENTS: ABNORMAL
QUEST PATIENT HISTORICAL REPORT: ABNORMAL
QUEST ZOLPIDEM: NORMAL
THC UR QL: NEGATIVE NG/ML
TRAMADOL UR-MCNC: NEGATIVE NG/ML
ZOLPIDEM PHENYL-4-CARB UR CFM-MCNC: NORMAL NG/ML

## 2025-07-21 LAB
AMPHETAMINES UR QL: NEGATIVE NG/ML
BARBITURATES UR QL: NEGATIVE NG/ML
BENZODIAZ UR QL: NEGATIVE NG/ML
BZE UR QL: NEGATIVE NG/ML
CODEINE UR-MCNC: NEGATIVE NG/ML
CREAT UR-MCNC: 101.5 MG/DL
DRUG SCREEN COMMENT UR-IMP: ABNORMAL
DRUG SCREEN COMMENT UR-IMP: NORMAL
FENTANYL UR QL SCN: NEGATIVE NG/ML
HYDROCODONE UR-MCNC: 61 NG/ML
HYDROMORPHONE UR-MCNC: NEGATIVE NG/ML
METHADONE UR QL: NEGATIVE NG/ML
MORPHINE UR-MCNC: NEGATIVE NG/ML
NORHYDROCODONE UR CFM-MCNC: 271 NG/ML
NORTRAMADOL UR-MCNC: NEGATIVE NG/ML
OPIATES UR QL: POSITIVE NG/ML
OXIDANTS UR QL: NEGATIVE MCG/ML
OXYCODONE UR QL: NEGATIVE NG/ML
PCP UR QL: NEGATIVE NG/ML
PH UR: 7.2 [PH] (ref 4.5–9)
QUEST 6 ACETYLMORPHINE: NEGATIVE NG/ML
QUEST NOTES AND COMMENTS: ABNORMAL
QUEST ZOLPIDEM: NEGATIVE NG/ML
THC UR QL: NEGATIVE NG/ML
TRAMADOL UR-MCNC: NEGATIVE NG/ML
ZOLPIDEM PHENYL-4-CARB UR CFM-MCNC: NEGATIVE NG/ML

## 2025-07-24 ENCOUNTER — APPOINTMENT (OUTPATIENT)
Dept: RADIOLOGY | Facility: CLINIC | Age: 70
End: 2025-07-24
Payer: COMMERCIAL

## 2025-08-05 ENCOUNTER — APPOINTMENT (OUTPATIENT)
Dept: PAIN MEDICINE | Facility: CLINIC | Age: 70
End: 2025-08-05
Payer: COMMERCIAL

## 2025-08-06 ENCOUNTER — PATIENT MESSAGE (OUTPATIENT)
Dept: PRIMARY CARE | Facility: CLINIC | Age: 70
End: 2025-08-06
Payer: COMMERCIAL

## 2025-08-06 DIAGNOSIS — M54.50 LOW BACK PAIN, UNSPECIFIED BACK PAIN LATERALITY, UNSPECIFIED CHRONICITY, UNSPECIFIED WHETHER SCIATICA PRESENT: ICD-10-CM

## 2025-08-06 RX ORDER — HYDROCODONE BITARTRATE AND ACETAMINOPHEN 5; 325 MG/1; MG/1
1 TABLET ORAL EVERY 6 HOURS PRN
Qty: 60 TABLET | Refills: 0 | Status: SHIPPED | OUTPATIENT
Start: 2025-08-06

## 2025-08-07 ENCOUNTER — APPOINTMENT (OUTPATIENT)
Dept: RADIOLOGY | Facility: CLINIC | Age: 70
End: 2025-08-07
Payer: COMMERCIAL

## 2025-08-11 ENCOUNTER — APPOINTMENT (OUTPATIENT)
Dept: ALLERGY | Facility: CLINIC | Age: 70
End: 2025-08-11
Payer: COMMERCIAL

## 2025-08-13 ENCOUNTER — APPOINTMENT (OUTPATIENT)
Dept: RADIOLOGY | Facility: CLINIC | Age: 70
End: 2025-08-13
Payer: COMMERCIAL

## 2025-08-13 DIAGNOSIS — Z78.0 MENOPAUSE: ICD-10-CM

## 2025-08-13 PROCEDURE — 77080 DXA BONE DENSITY AXIAL: CPT

## 2025-08-13 PROCEDURE — 77080 DXA BONE DENSITY AXIAL: CPT | Performed by: RADIOLOGY

## 2025-08-14 ENCOUNTER — APPOINTMENT (OUTPATIENT)
Dept: PAIN MEDICINE | Facility: CLINIC | Age: 70
End: 2025-08-14
Payer: COMMERCIAL

## 2025-08-19 ENCOUNTER — APPOINTMENT (OUTPATIENT)
Dept: PAIN MEDICINE | Facility: CLINIC | Age: 70
End: 2025-08-19
Payer: COMMERCIAL

## 2025-08-19 VITALS
HEIGHT: 66 IN | TEMPERATURE: 96.8 F | WEIGHT: 186 LBS | BODY MASS INDEX: 29.89 KG/M2 | RESPIRATION RATE: 18 BRPM | HEART RATE: 66 BPM | SYSTOLIC BLOOD PRESSURE: 124 MMHG | OXYGEN SATURATION: 95 % | DIASTOLIC BLOOD PRESSURE: 86 MMHG

## 2025-08-19 DIAGNOSIS — M46.1 SACROILIITIS: Primary | ICD-10-CM

## 2025-08-19 PROCEDURE — 3074F SYST BP LT 130 MM HG: CPT | Performed by: ANESTHESIOLOGY

## 2025-08-19 PROCEDURE — 99213 OFFICE O/P EST LOW 20 MIN: CPT

## 2025-08-19 PROCEDURE — 3008F BODY MASS INDEX DOCD: CPT | Performed by: ANESTHESIOLOGY

## 2025-08-19 PROCEDURE — 99214 OFFICE O/P EST MOD 30 MIN: CPT | Performed by: ANESTHESIOLOGY

## 2025-08-19 PROCEDURE — 3079F DIAST BP 80-89 MM HG: CPT | Performed by: ANESTHESIOLOGY

## 2025-08-19 PROCEDURE — 1159F MED LIST DOCD IN RCRD: CPT | Performed by: ANESTHESIOLOGY

## 2025-08-19 PROCEDURE — 1125F AMNT PAIN NOTED PAIN PRSNT: CPT | Performed by: ANESTHESIOLOGY

## 2025-08-19 ASSESSMENT — PAIN DESCRIPTION - DESCRIPTORS: DESCRIPTORS: SHARP

## 2025-08-19 ASSESSMENT — PAIN - FUNCTIONAL ASSESSMENT
PAIN_FUNCTIONAL_ASSESSMENT: 0-10
PAIN_FUNCTIONAL_ASSESSMENT: 0-10

## 2025-08-19 ASSESSMENT — ENCOUNTER SYMPTOMS
LOSS OF SENSATION IN FEET: 0
DEPRESSION: 0
OCCASIONAL FEELINGS OF UNSTEADINESS: 0

## 2025-08-19 ASSESSMENT — PAIN SCALES - GENERAL
PAINLEVEL_OUTOF10: 8
PAINLEVEL_OUTOF10: 8

## 2025-08-25 ENCOUNTER — OFFICE VISIT (OUTPATIENT)
Dept: PRIMARY CARE | Facility: CLINIC | Age: 70
End: 2025-08-25
Payer: COMMERCIAL

## 2025-08-25 VITALS
OXYGEN SATURATION: 97 % | BODY MASS INDEX: 30.22 KG/M2 | DIASTOLIC BLOOD PRESSURE: 75 MMHG | SYSTOLIC BLOOD PRESSURE: 112 MMHG | WEIGHT: 188 LBS | HEIGHT: 66 IN | TEMPERATURE: 97.7 F | HEART RATE: 92 BPM

## 2025-08-25 DIAGNOSIS — H10.31 ACUTE BACTERIAL CONJUNCTIVITIS OF RIGHT EYE: Primary | ICD-10-CM

## 2025-08-25 PROCEDURE — 1159F MED LIST DOCD IN RCRD: CPT | Performed by: INTERNAL MEDICINE

## 2025-08-25 PROCEDURE — 3078F DIAST BP <80 MM HG: CPT | Performed by: INTERNAL MEDICINE

## 2025-08-25 PROCEDURE — 1036F TOBACCO NON-USER: CPT | Performed by: INTERNAL MEDICINE

## 2025-08-25 PROCEDURE — 3008F BODY MASS INDEX DOCD: CPT | Performed by: INTERNAL MEDICINE

## 2025-08-25 PROCEDURE — 3074F SYST BP LT 130 MM HG: CPT | Performed by: INTERNAL MEDICINE

## 2025-08-25 PROCEDURE — 99213 OFFICE O/P EST LOW 20 MIN: CPT | Performed by: INTERNAL MEDICINE

## 2025-08-25 RX ORDER — TOBRAMYCIN 3 MG/ML
1 SOLUTION/ DROPS OPHTHALMIC EVERY 4 HOURS
Qty: 5 ML | Refills: 0 | Status: SHIPPED | OUTPATIENT
Start: 2025-08-25 | End: 2025-09-01

## 2025-08-26 ENCOUNTER — APPOINTMENT (OUTPATIENT)
Dept: CARDIOLOGY | Facility: CLINIC | Age: 70
End: 2025-08-26
Payer: COMMERCIAL

## 2025-08-28 ENCOUNTER — APPOINTMENT (OUTPATIENT)
Dept: ALLERGY | Facility: CLINIC | Age: 70
End: 2025-08-28
Payer: COMMERCIAL

## 2025-09-04 ENCOUNTER — OFFICE VISIT (OUTPATIENT)
Dept: CARDIOLOGY | Facility: CLINIC | Age: 70
End: 2025-09-04
Payer: COMMERCIAL

## 2025-09-04 VITALS
OXYGEN SATURATION: 94 % | HEIGHT: 65 IN | RESPIRATION RATE: 20 BRPM | WEIGHT: 189 LBS | DIASTOLIC BLOOD PRESSURE: 78 MMHG | HEART RATE: 78 BPM | BODY MASS INDEX: 31.49 KG/M2 | SYSTOLIC BLOOD PRESSURE: 112 MMHG

## 2025-09-04 DIAGNOSIS — Z98.890 HISTORY OF RADIOFREQUENCY ABLATION (RFA) PROCEDURE FOR CARDIAC ARRHYTHMIA: ICD-10-CM

## 2025-09-04 DIAGNOSIS — E78.00 PURE HYPERCHOLESTEROLEMIA: ICD-10-CM

## 2025-09-04 DIAGNOSIS — R07.89 ATYPICAL CHEST PAIN: ICD-10-CM

## 2025-09-04 DIAGNOSIS — R00.2 PALPITATIONS: ICD-10-CM

## 2025-09-04 DIAGNOSIS — I10 PRIMARY HYPERTENSION: Primary | ICD-10-CM

## 2025-09-04 DIAGNOSIS — Z86.79 S/P RF ABLATION OPERATION FOR ARRHYTHMIA: ICD-10-CM

## 2025-09-04 DIAGNOSIS — R94.39 ABNORMAL STRESS TEST: ICD-10-CM

## 2025-09-04 DIAGNOSIS — Z98.890 S/P RF ABLATION OPERATION FOR ARRHYTHMIA: ICD-10-CM

## 2025-09-04 DIAGNOSIS — R94.31 ABNORMAL EKG: ICD-10-CM

## 2025-09-04 DIAGNOSIS — I47.10 PAROXYSMAL SVT (SUPRAVENTRICULAR TACHYCARDIA): ICD-10-CM

## 2025-09-04 PROCEDURE — 1159F MED LIST DOCD IN RCRD: CPT | Performed by: STUDENT IN AN ORGANIZED HEALTH CARE EDUCATION/TRAINING PROGRAM

## 2025-09-04 PROCEDURE — 1126F AMNT PAIN NOTED NONE PRSNT: CPT | Performed by: STUDENT IN AN ORGANIZED HEALTH CARE EDUCATION/TRAINING PROGRAM

## 2025-09-04 PROCEDURE — 99212 OFFICE O/P EST SF 10 MIN: CPT

## 2025-09-04 PROCEDURE — 3074F SYST BP LT 130 MM HG: CPT | Performed by: STUDENT IN AN ORGANIZED HEALTH CARE EDUCATION/TRAINING PROGRAM

## 2025-09-04 PROCEDURE — 99214 OFFICE O/P EST MOD 30 MIN: CPT | Performed by: STUDENT IN AN ORGANIZED HEALTH CARE EDUCATION/TRAINING PROGRAM

## 2025-09-04 PROCEDURE — 1036F TOBACCO NON-USER: CPT | Performed by: STUDENT IN AN ORGANIZED HEALTH CARE EDUCATION/TRAINING PROGRAM

## 2025-09-04 PROCEDURE — 3008F BODY MASS INDEX DOCD: CPT | Performed by: STUDENT IN AN ORGANIZED HEALTH CARE EDUCATION/TRAINING PROGRAM

## 2025-09-04 PROCEDURE — 3078F DIAST BP <80 MM HG: CPT | Performed by: STUDENT IN AN ORGANIZED HEALTH CARE EDUCATION/TRAINING PROGRAM

## 2025-09-04 RX ORDER — METOPROLOL SUCCINATE 25 MG/1
25 TABLET, EXTENDED RELEASE ORAL DAILY
Qty: 90 TABLET | Refills: 3 | Status: SHIPPED | OUTPATIENT
Start: 2025-09-04

## 2025-09-04 ASSESSMENT — ENCOUNTER SYMPTOMS
OCCASIONAL FEELINGS OF UNSTEADINESS: 0
DEPRESSION: 0
LOSS OF SENSATION IN FEET: 0

## 2025-09-04 ASSESSMENT — PAIN SCALES - GENERAL: PAINLEVEL_OUTOF10: 0-NO PAIN

## 2025-10-23 ENCOUNTER — APPOINTMENT (OUTPATIENT)
Dept: PRIMARY CARE | Facility: CLINIC | Age: 70
End: 2025-10-23
Payer: COMMERCIAL

## 2025-10-30 ENCOUNTER — APPOINTMENT (OUTPATIENT)
Dept: ALLERGY | Facility: CLINIC | Age: 70
End: 2025-10-30
Payer: COMMERCIAL

## 2026-09-03 ENCOUNTER — APPOINTMENT (OUTPATIENT)
Dept: CARDIOLOGY | Facility: CLINIC | Age: 71
End: 2026-09-03
Payer: COMMERCIAL

## (undated) DEVICE — ADHESIVE, SKIN, DERMABOND ADVANCED, 15CM, PEN-STYLE

## (undated) DEVICE — GLOVE, SURGICAL, PROTEXIS PI BLUE W/NEUTHERA, 8.0, PF, LF

## (undated) DEVICE — DRAPE, SHEET, THREE QUARTER, FAN FOLD, 57 X 77 IN

## (undated) DEVICE — DRAPE, SHEET, ENDOSCOPY, GENERAL, FENESTRATED, ARMBOARD COVER, 98 X 123.5 IN, DISPOSABLE, LF, STERILE

## (undated) DEVICE — Device

## (undated) DEVICE — PENCIL, SMOKE EVACUATION, VALLEYLAB

## (undated) DEVICE — SUTURE, VICRYL, 3-0, 27 IN, SH

## (undated) DEVICE — SUTURE, MONOCRYL, 4-0, 18 IN, PS2, UNDYED

## (undated) DEVICE — DRESSING, NON-ADHERENT, TELFA, OUCHLESS, 3 X 8 IN, STERILE

## (undated) DEVICE — TOWEL, SURGICAL, NEURO, O/R, 16 X 26, BLUE, STERILE

## (undated) DEVICE — ELECTRODE, ELECTROSURGICAL, BLADE, INSULATED, ENT/IMA, STERILE